# Patient Record
Sex: FEMALE | Race: BLACK OR AFRICAN AMERICAN | NOT HISPANIC OR LATINO | Employment: FULL TIME | URBAN - METROPOLITAN AREA
[De-identification: names, ages, dates, MRNs, and addresses within clinical notes are randomized per-mention and may not be internally consistent; named-entity substitution may affect disease eponyms.]

---

## 2017-03-17 ENCOUNTER — OFFICE VISIT (OUTPATIENT)
Dept: OBSTETRICS AND GYNECOLOGY | Facility: CLINIC | Age: 32
End: 2017-03-17
Payer: MEDICAID

## 2017-03-17 VITALS
SYSTOLIC BLOOD PRESSURE: 112 MMHG | DIASTOLIC BLOOD PRESSURE: 84 MMHG | BODY MASS INDEX: 38.31 KG/M2 | WEIGHT: 258.63 LBS | HEIGHT: 69 IN

## 2017-03-17 DIAGNOSIS — Z01.419 ENCOUNTER FOR WELL WOMAN EXAM WITH ROUTINE GYNECOLOGICAL EXAM: Primary | ICD-10-CM

## 2017-03-17 DIAGNOSIS — N89.8 VAGINAL DISCHARGE: ICD-10-CM

## 2017-03-17 DIAGNOSIS — N76.0 BV (BACTERIAL VAGINOSIS): ICD-10-CM

## 2017-03-17 DIAGNOSIS — B96.89 BV (BACTERIAL VAGINOSIS): ICD-10-CM

## 2017-03-17 PROCEDURE — 99999 PR PBB SHADOW E&M-EST. PATIENT-LVL III: CPT | Mod: PBBFAC,,, | Performed by: ADVANCED PRACTICE MIDWIFE

## 2017-03-17 PROCEDURE — 87591 N.GONORRHOEAE DNA AMP PROB: CPT

## 2017-03-17 PROCEDURE — 88141 CYTOPATH C/V INTERPRET: CPT | Mod: ,,, | Performed by: PATHOLOGY

## 2017-03-17 PROCEDURE — 99213 OFFICE O/P EST LOW 20 MIN: CPT | Mod: PBBFAC | Performed by: ADVANCED PRACTICE MIDWIFE

## 2017-03-17 PROCEDURE — 88175 CYTOPATH C/V AUTO FLUID REDO: CPT | Performed by: PATHOLOGY

## 2017-03-17 PROCEDURE — 99395 PREV VISIT EST AGE 18-39: CPT | Mod: S$PBB,,, | Performed by: ADVANCED PRACTICE MIDWIFE

## 2017-03-17 RX ORDER — METRONIDAZOLE 500 MG/1
500 TABLET ORAL 2 TIMES DAILY
Qty: 14 TABLET | Refills: 0 | Status: SHIPPED | OUTPATIENT
Start: 2017-03-17 | End: 2017-03-24

## 2017-03-17 NOTE — PROGRESS NOTES
"CC: Well woman exam    Pranay Piña is a 32 y.o. female  presents for a well woman exam.  LMP: Patient's last menstrual period was 03/10/2017..  Complaining of vaginal discharge with odor.    Past Medical History:   Diagnosis Date    Abnormal Pap smear 2005    colposcopy     Past Surgical History:   Procedure Laterality Date    Right Knee       Social History     Social History    Marital status: Single     Spouse name: N/A    Number of children: N/A    Years of education: N/A     Social History Main Topics    Smoking status: Never Smoker    Smokeless tobacco: Never Used    Alcohol use No    Drug use: No    Sexual activity: Yes     Partners: Male     Birth control/ protection: None     Other Topics Concern    None     Social History Narrative     Family History   Problem Relation Age of Onset    Hypertension Maternal Grandfather     Hypertension Mother     Hypertension Maternal Uncle     Hypertension Maternal Uncle     Breast cancer Neg Hx     Colon cancer Neg Hx     Diabetes Neg Hx     Ovarian cancer Neg Hx     Stroke Neg Hx      OB History      Para Term  AB TAB SAB Ectopic Multiple Living    4 3 3 0 1 0 1 0 0 4          /84  Ht 5' 9" (1.753 m)  Wt 117.3 kg (258 lb 9.6 oz)  LMP 03/10/2017  BMI 38.19 kg/m2      ROS:    ROS:  GENERAL: Denies weight gain or weight loss. Feeling well overall.   SKIN: Denies rash or lesions.   HEAD: Denies head injury or headache.   NODES: Denies enlarged lymph nodes.   CHEST: Denies chest pain or shortness of breath.   CARDIOVASCULAR: Denies palpitations or left sided chest pain.   ABDOMEN: No abdominal pain, constipation, diarrhea, nausea, vomiting or rectal bleeding.   URINARY: No frequency, dysuria, hematuria, or burning on urination.  REPRODUCTIVE: See HPI.   BREASTS: The patient performs breast self-examination and denies pain, lumps, or nipple discharge.   HEMATOLOGIC: No easy bruisability or excessive bleeding. "   MUSCULOSKELETAL: Denies joint pain or swelling.   NEUROLOGIC: Denies syncope or weakness.   PSYCHIATRIC: Denies depression, anxiety or mood swings.    PHYSICAL EXAM:    APPEARANCE: Well nourished, well developed, in no acute distress.  AFFECT: WNL, alert and oriented x 3  SKIN: No acne or hirsutism  NECK: Neck symmetric without masses or thyromegaly  NODES: No inguinal, cervical, axillary, or femoral lymph node enlargement  CHEST: Good respiratory effect  ABDOMEN: Soft.  No tenderness or masses.  No hepatosplenomegaly.  No hernias.  BREASTS: Symmetrical, no skin changes or visible lesions.  No palpable masses, nipple discharge bilaterally.  PELVIC: Normal external genitalia without lesions.  Normal hair distribution.  Adequate perineal body, normal urethral meatus.  Vagina moist and well rugated without lesions or discharge.  Cervix pink, without lesions, white thick, discharge present. No tenderness.  No significant cystocele or rectocele.  Bimanual exam shows uterus to be normal size, regular, mobile and nontender.  Adnexa without masses or tenderness.    EXTREMITIES: No edema.    1. Encounter for well woman exam with routine gynecological exam  Liquid-based pap smear, screening   2. Vaginal discharge  C. trachomatis/N. gonorrhoeae by AMP DNA Cervix   3. BV (bacterial vaginosis)      PLAN:  Flagyl for BV  Patient was counseled today on A.C.S. Pap guidelines and recommendations for yearly pelvic exams, mammograms and monthly self breast exams; to see her PCP for other health maintenance.

## 2017-03-20 LAB
C TRACH DNA SPEC QL NAA+PROBE: NEGATIVE
N GONORRHOEA DNA SPEC QL NAA+PROBE: NEGATIVE

## 2017-03-24 ENCOUNTER — PATIENT MESSAGE (OUTPATIENT)
Dept: OBSTETRICS AND GYNECOLOGY | Facility: CLINIC | Age: 32
End: 2017-03-24

## 2017-05-24 ENCOUNTER — TELEPHONE (OUTPATIENT)
Dept: OBSTETRICS AND GYNECOLOGY | Facility: CLINIC | Age: 32
End: 2017-05-24

## 2017-05-25 ENCOUNTER — TELEPHONE (OUTPATIENT)
Dept: OBSTETRICS AND GYNECOLOGY | Facility: HOSPITAL | Age: 32
End: 2017-05-25

## 2017-06-21 ENCOUNTER — TELEPHONE (OUTPATIENT)
Dept: OBSTETRICS AND GYNECOLOGY | Facility: CLINIC | Age: 32
End: 2017-06-21

## 2017-07-27 ENCOUNTER — OFFICE VISIT (OUTPATIENT)
Dept: OBSTETRICS AND GYNECOLOGY | Facility: CLINIC | Age: 32
End: 2017-07-27
Payer: MEDICAID

## 2017-07-27 ENCOUNTER — LAB VISIT (OUTPATIENT)
Dept: LAB | Facility: HOSPITAL | Age: 32
End: 2017-07-27
Attending: NURSE PRACTITIONER
Payer: MEDICAID

## 2017-07-27 VITALS
SYSTOLIC BLOOD PRESSURE: 122 MMHG | HEIGHT: 69 IN | DIASTOLIC BLOOD PRESSURE: 80 MMHG | BODY MASS INDEX: 38.44 KG/M2 | WEIGHT: 259.5 LBS

## 2017-07-27 DIAGNOSIS — Z34.81 ENCOUNTER FOR SUPERVISION OF OTHER NORMAL PREGNANCY IN FIRST TRIMESTER: ICD-10-CM

## 2017-07-27 DIAGNOSIS — Z34.81 ENCOUNTER FOR SUPERVISION OF OTHER NORMAL PREGNANCY IN FIRST TRIMESTER: Primary | ICD-10-CM

## 2017-07-27 LAB
BASOPHILS # BLD AUTO: 0.02 K/UL
BASOPHILS NFR BLD: 0.5 %
DIFFERENTIAL METHOD: ABNORMAL
EOSINOPHIL # BLD AUTO: 0.1 K/UL
EOSINOPHIL NFR BLD: 1.2 %
ERYTHROCYTE [DISTWIDTH] IN BLOOD BY AUTOMATED COUNT: 14.5 %
GLUCOSE SERPL-MCNC: 82 MG/DL
HCT VFR BLD AUTO: 38.4 %
HGB BLD-MCNC: 13 G/DL
HGB S BLD QL SOLY: NEGATIVE
LYMPHOCYTES # BLD AUTO: 1.7 K/UL
LYMPHOCYTES NFR BLD: 39.4 %
MCH RBC QN AUTO: 31.2 PG
MCHC RBC AUTO-ENTMCNC: 33.9 G/DL
MCV RBC AUTO: 92 FL
MONOCYTES # BLD AUTO: 0.4 K/UL
MONOCYTES NFR BLD: 9.7 %
NEUTROPHILS # BLD AUTO: 2.1 K/UL
NEUTROPHILS NFR BLD: 49.2 %
PLATELET # BLD AUTO: 292 K/UL
PMV BLD AUTO: 10.2 FL
RBC # BLD AUTO: 4.17 M/UL
WBC # BLD AUTO: 4.24 K/UL

## 2017-07-27 PROCEDURE — 86703 HIV-1/HIV-2 1 RESULT ANTBDY: CPT

## 2017-07-27 PROCEDURE — 85660 RBC SICKLE CELL TEST: CPT

## 2017-07-27 PROCEDURE — 99213 OFFICE O/P EST LOW 20 MIN: CPT | Mod: TH,S$PBB,, | Performed by: NURSE PRACTITIONER

## 2017-07-27 PROCEDURE — 85025 COMPLETE CBC W/AUTO DIFF WBC: CPT

## 2017-07-27 PROCEDURE — 86900 BLOOD TYPING SEROLOGIC ABO: CPT

## 2017-07-27 PROCEDURE — 36415 COLL VENOUS BLD VENIPUNCTURE: CPT

## 2017-07-27 PROCEDURE — 86592 SYPHILIS TEST NON-TREP QUAL: CPT

## 2017-07-27 PROCEDURE — 80074 ACUTE HEPATITIS PANEL: CPT

## 2017-07-27 PROCEDURE — 99999 PR PBB SHADOW E&M-EST. PATIENT-LVL III: CPT | Mod: PBBFAC,,, | Performed by: NURSE PRACTITIONER

## 2017-07-27 PROCEDURE — 82947 ASSAY GLUCOSE BLOOD QUANT: CPT

## 2017-07-27 PROCEDURE — 86762 RUBELLA ANTIBODY: CPT

## 2017-07-27 PROCEDURE — 86901 BLOOD TYPING SEROLOGIC RH(D): CPT

## 2017-07-27 NOTE — PROGRESS NOTES
"CC: Risk assessment    Pranay Piña is a 32 y.o. female  presents for risk assessment.  LMP: Patient's last menstrual period was 2017 (approximate)..  7 w 2 d JOSE 2018. Last pap exam was ASCUS with + HPV. S/P colpo. Patient has an OB h/o 3 term vagina deliveries and 2 SAB. Same FOB.     Past Medical History:   Diagnosis Date    Abnormal Pap smear 2005    colposcopy     Past Surgical History:   Procedure Laterality Date    Right Knee       Social History     Social History    Marital status: Single     Spouse name: N/A    Number of children: N/A    Years of education: N/A     Occupational History    Not on file.     Social History Main Topics    Smoking status: Never Smoker    Smokeless tobacco: Never Used    Alcohol use No    Drug use: No    Sexual activity: Yes     Partners: Male     Birth control/ protection: None     Other Topics Concern    Not on file     Social History Narrative    No narrative on file     Family History   Problem Relation Age of Onset    Hypertension Maternal Grandfather     Hypertension Mother     Hypertension Maternal Uncle     Hypertension Maternal Uncle     Breast cancer Neg Hx     Colon cancer Neg Hx     Diabetes Neg Hx     Ovarian cancer Neg Hx     Stroke Neg Hx      OB History      Para Term  AB Living    5 3 3 0 2 4    SAB TAB Ectopic Multiple Live Births    2 0 0 0 3          /80   Ht 5' 9" (1.753 m)   Wt 117.7 kg (259 lb 7.7 oz)   LMP 2017 (Approximate)   BMI 38.32 kg/m²       ROS:  GENERAL: Denies weight gain or weight loss. Feeling well overall.   SKIN: Denies rash or lesions.   HEAD: Denies head injury or headache.   NODES: Denies enlarged lymph nodes.   CHEST: Denies chest pain or shortness of breath.   CARDIOVASCULAR: Denies palpitations or left sided chest pain.   ABDOMEN: No abdominal pain, constipation, diarrhea, nausea, vomiting or rectal bleeding.   URINARY: No frequency, dysuria, " hematuria, or burning on urination.  REPRODUCTIVE: See HPI.   BREASTS: The patient performs breast self-examination and denies pain, lumps, or nipple discharge.   HEMATOLOGIC: No easy bruisability or excessive bleeding.   MUSCULOSKELETAL: Denies joint pain or swelling.   NEUROLOGIC: Denies syncope or weakness.   PSYCHIATRIC: Denies depression, anxiety or mood swings.    PHYSICAL EXAM:  APPEARANCE: Obese AA female, in no acute distress.  AFFECT: WNL, alert and oriented x 3  SKIN: No acne or hirsutism  NECK: Neck symmetric without masses or thyromegaly  NODES: No inguinal, cervical, axillary, or femoral lymph node enlargement  CHEST: Good respiratory effect  ABDOMEN: Soft.  No tenderness or masses.    PELVIC: Normal external genitalia without lesions.  Normal hair distribution.  Adequate perineal body, normal urethral meatus.  Vagina moist and well rugated without lesions or discharge.  Cervix pink, without lesions, discharge or tenderness.  No significant cystocele or rectocele.  Bimanual exam shows uterus to be  8 week size, regular, mobile and nontender.  Adnexa without masses or tenderness.    EXTREMITIES: No edema.    PLAN:  Initial labs  Initial OB ultrasound and visit

## 2017-07-28 LAB
ABO + RH BLD: NORMAL
BLD GP AB SCN CELLS X3 SERPL QL: NORMAL
C TRACH DNA SPEC QL NAA+PROBE: NOT DETECTED
HAV IGM SERPL QL IA: NEGATIVE
HBV CORE IGM SERPL QL IA: NEGATIVE
HBV SURFACE AG SERPL QL IA: NEGATIVE
HCV AB SERPL QL IA: NEGATIVE
HIV 1+2 AB+HIV1 P24 AG SERPL QL IA: NEGATIVE
N GONORRHOEA DNA SPEC QL NAA+PROBE: NOT DETECTED
RPR SER QL: NORMAL
RUBV IGG SER-ACNC: 12.6 IU/ML
RUBV IGG SER-IMP: REACTIVE

## 2017-07-30 ENCOUNTER — HOSPITAL ENCOUNTER (EMERGENCY)
Facility: HOSPITAL | Age: 32
Discharge: HOME | End: 2017-07-30
Attending: EMERGENCY MEDICINE
Payer: MEDICAID

## 2017-07-30 VITALS
DIASTOLIC BLOOD PRESSURE: 83 MMHG | SYSTOLIC BLOOD PRESSURE: 137 MMHG | HEIGHT: 69 IN | TEMPERATURE: 98 F | BODY MASS INDEX: 38.51 KG/M2 | WEIGHT: 260 LBS | HEART RATE: 61 BPM | RESPIRATION RATE: 20 BRPM | OXYGEN SATURATION: 98 %

## 2017-07-30 DIAGNOSIS — Z34.91 FIRST TRIMESTER PREGNANCY: ICD-10-CM

## 2017-07-30 DIAGNOSIS — B96.89 BACTERIAL VAGINOSIS: Primary | ICD-10-CM

## 2017-07-30 DIAGNOSIS — O23.41 UTI (URINARY TRACT INFECTION) DURING PREGNANCY, FIRST TRIMESTER: ICD-10-CM

## 2017-07-30 DIAGNOSIS — N76.0 BACTERIAL VAGINOSIS: Primary | ICD-10-CM

## 2017-07-30 LAB
B-HCG UR QL: POSITIVE
BACTERIA #/AREA URNS HPF: ABNORMAL /HPF
BACTERIA GENITAL QL WET PREP: ABNORMAL
BILIRUB UR QL STRIP: NEGATIVE
CLARITY UR: CLEAR
CLUE CELLS VAG QL WET PREP: ABNORMAL
COLOR UR: YELLOW
FILAMENT FUNGI VAG WET PREP-#/AREA: ABNORMAL
GLUCOSE UR QL STRIP: NEGATIVE
HCG INTACT+B SERPL-ACNC: NORMAL MIU/ML
HGB UR QL STRIP: NEGATIVE
KETONES UR QL STRIP: NEGATIVE
LEUKOCYTE ESTERASE UR QL STRIP: ABNORMAL
MICROSCOPIC COMMENT: ABNORMAL
NITRITE UR QL STRIP: NEGATIVE
PH UR STRIP: 8 [PH] (ref 5–8)
PROT UR QL STRIP: NEGATIVE
RBC #/AREA URNS HPF: 0 /HPF (ref 0–4)
SP GR UR STRIP: 1.02 (ref 1–1.03)
SPECIMEN SOURCE: ABNORMAL
SQUAMOUS #/AREA URNS HPF: 15 /HPF
T VAGINALIS GENITAL QL WET PREP: ABNORMAL
URN SPEC COLLECT METH UR: ABNORMAL
UROBILINOGEN UR STRIP-ACNC: 1 EU/DL
WBC #/AREA URNS HPF: 5 /HPF (ref 0–5)
WBC #/AREA VAG WET PREP: ABNORMAL
YEAST GENITAL QL WET PREP: ABNORMAL
YEAST URNS QL MICRO: ABNORMAL

## 2017-07-30 PROCEDURE — 99284 EMERGENCY DEPT VISIT MOD MDM: CPT

## 2017-07-30 PROCEDURE — 84702 CHORIONIC GONADOTROPIN TEST: CPT

## 2017-07-30 PROCEDURE — 87210 SMEAR WET MOUNT SALINE/INK: CPT

## 2017-07-30 PROCEDURE — 81025 URINE PREGNANCY TEST: CPT

## 2017-07-30 PROCEDURE — 81000 URINALYSIS NONAUTO W/SCOPE: CPT

## 2017-07-30 RX ORDER — NITROFURANTOIN 25; 75 MG/1; MG/1
100 CAPSULE ORAL 2 TIMES DAILY
Qty: 14 CAPSULE | Refills: 0 | Status: SHIPPED | OUTPATIENT
Start: 2017-07-30 | End: 2017-08-06

## 2017-07-30 RX ORDER — METRONIDAZOLE 7.5 MG/G
1 GEL VAGINAL 2 TIMES DAILY
Qty: 70 G | Refills: 0 | Status: SHIPPED | OUTPATIENT
Start: 2017-07-30 | End: 2017-10-24

## 2017-07-30 NOTE — ED NOTES
Pt states abdominal cramping since yesterday; is currently 7 weeks pregnant. Denies any vaginal bleeding, nausea or vomiting.

## 2017-07-30 NOTE — ED NOTES
Bed: LA 06  Expected date:   Expected time:   Means of arrival:   Comments:     Jenn Galdamez  07/30/17 [id number]

## 2017-07-30 NOTE — ED PROVIDER NOTES
SCRIBE #1 NOTE: I, Jenn Galdamez, am scribing for, and in the presence of, Per Erazo Jr., MD. I have scribed the entire note.      History      Chief Complaint   Patient presents with    Abdominal Cramping     7 weeks pregnant and crampin to lower abd,/no bleeding       Review of patient's allergies indicates:  No Known Allergies     HPI   HPI    7/30/2017, 5:51 PM   History obtained from the patient      History of Present Illness: Pranay Piña is a 32 y.o. female patient who presents to the Emergency Department for lower abd cramping which onset gradually yesterday. Pt states that she is 7 weeks pregnant. Symptoms are constant and moderate in severity.  No mitigating or exacerbating factors reported. No associated sxs reported. Patient denies any fever, chills, HA, lightheadedness, vaginal bleeding/discharge, pelvic pain, dysuria, and all other sxs at this time. No prior Tx reported. No further complaints or concerns at this time.         Arrival mode: Personal vehicle     PCP: Primary Doctor No       Past Medical History:  Past Medical History:   Diagnosis Date    Abnormal Pap smear 1/2005    colposcopy       Past Surgical History:  Past Surgical History:   Procedure Laterality Date    Right Knee           Family History:  Family History   Problem Relation Age of Onset    Hypertension Maternal Grandfather     Hypertension Mother     Hypertension Maternal Uncle     Hypertension Maternal Uncle     Breast cancer Neg Hx     Colon cancer Neg Hx     Diabetes Neg Hx     Ovarian cancer Neg Hx     Stroke Neg Hx        Social History:  Social History     Social History Main Topics    Smoking status: Never Smoker    Smokeless tobacco: Never Used    Alcohol use No    Drug use: No    Sexual activity: Yes     Partners: Male     Birth control/ protection: None       ROS   Review of Systems   Constitutional: Negative for fever.   HENT: Negative for sore throat.    Respiratory: Negative for  shortness of breath.    Cardiovascular: Negative for chest pain.   Gastrointestinal: Positive for abdominal pain. Negative for nausea.   Genitourinary: Negative for dysuria, pelvic pain, vaginal bleeding and vaginal discharge.   Musculoskeletal: Negative for back pain.   Skin: Negative for rash.   Neurological: Negative for weakness, light-headedness and headaches.   Hematological: Does not bruise/bleed easily.       Physical Exam      Initial Vitals [07/30/17 1741]   BP Pulse Resp Temp SpO2   (!) 146/80 66 18 97.9 °F (36.6 °C) 100 %      MAP       102          Physical Exam  Nursing Notes and Vital Signs Reviewed.  Constitutional: Patient is in no apparent distress. Well-developed and well-nourished.  Head: Atraumatic. Normocephalic.  Eyes: PERRL. EOM intact. Conjunctivae are not pale. No scleral icterus.  ENT: Mucous membranes are moist. Oropharynx is clear and symmetric.    Neck: Supple. Full ROM. No lymphadenopathy.  Cardiovascular: Regular rate. Regular rhythm. No murmurs, rubs, or gallops. Distal pulses are 2+ and symmetric.  Pulmonary/Chest: No respiratory distress. Clear to auscultation bilaterally. No wheezing, rales, or rhonchi.  Abdominal: Soft and non-distended.  Lower abd tenderness.  No rebound, guarding, or rigidity. Good bowel sounds.  Genitourinary: No CVA tenderness  Pelvic: A female chaperone was present for this examination. Nl external inspection. No lesions or abnormalities were visible on the labia majora or minora. Cervical os is closed. There is no CMT. There is no blood in the vaginal vault. White vaginal discharge. No adnexal tenderness. No adnexal masses.  Musculoskeletal: Moves all extremities. No obvious deformities. No edema. No calf tenderness.  Skin: Warm and dry.  Neurological:  Alert, awake, and appropriate.  Normal speech.  No acute focal neurological deficits are appreciated.  Psychiatric: Normal affect. Good eye contact. Appropriate in content.    ED Course    Procedures  ED  "Vital Signs:  Vitals:    07/30/17 1741 07/30/17 1947   BP: (!) 146/80 137/83   Pulse: 66 61   Resp: 18 20   Temp: 97.9 °F (36.6 °C) 98.4 °F (36.9 °C)   TempSrc: Oral Oral   SpO2: 100% 98%   Weight: 117.9 kg (260 lb)    Height: 5' 9" (1.753 m)        Abnormal Lab Results:  Labs Reviewed   URINALYSIS - Abnormal; Notable for the following:        Result Value    Leukocytes, UA 1+ (*)     All other components within normal limits   URINALYSIS MICROSCOPIC - Abnormal; Notable for the following:     Bacteria, UA Few (*)     All other components within normal limits   VAGINAL SCREEN - Abnormal; Notable for the following:     Clue Cells, Wet Prep Moderate (*)     WBC - Vaginal Screen Few (*)     Bacteria - Vaginal Screen Moderate (*)     All other components within normal limits   HCG, QUANTITATIVE, PREGNANCY   PREGNANCY TEST, URINE RAPID        All Lab Results:  Results for orders placed or performed during the hospital encounter of 07/30/17   hCG, quantitative, pregnancy   Result Value Ref Range    hCG Quant [id number] See Text mIU/mL   Pregnancy, urine rapid (UPT)   Result Value Ref Range    Preg Test, Ur Positive    Urinalysis   Result Value Ref Range    Specimen UA Urine, Clean Catch     Color, UA Yellow Yellow, Straw, Violette    Appearance, UA Clear Clear    pH, UA 8.0 5.0 - 8.0    Specific Gravity, UA 1.020 1.005 - 1.030    Protein, UA Negative Negative    Glucose, UA Negative Negative    Ketones, UA Negative Negative    Bilirubin (UA) Negative Negative    Occult Blood UA Negative Negative    Nitrite, UA Negative Negative    Urobilinogen, UA 1.0 <2.0 EU/dL    Leukocytes, UA 1+ (A) Negative   Urinalysis Microscopic   Result Value Ref Range    RBC, UA 0 0 - 4 /hpf    WBC, UA 5 0 - 5 /hpf    Bacteria, UA Few (A) None-Occ /hpf    Yeast, UA CANCELED     Squam Epithel, UA 15 /hpf    Microscopic Comment SEE COMMENT    Vaginal Screen Vagina   Result Value Ref Range    Trichomonas None None    Clue Cells, Wet Prep Moderate (A) None "    Budding Yeast None None    Fungal Hyphae None None    WBC - Vaginal Screen Few (A) None    Bacteria - Vaginal Screen Moderate (A) None    Wet Prep Source Vagina None                The Emergency Provider reviewed the vital signs and test results, which are outlined above.    ED Discussion   Pre-hypertension/Hypertension: The pt has been informed that they may have pre-hypertension or hypertension based on a blood pressure reading in the ED. I recommend that the pt call the PCP listed on their discharge instructions or a physician of their choice this week to arrange f/u for further evaluation of possible pre-hypertension or hypertension.     6:53 PM: Fetal heart tones IUP verified at this time.    7:07 PM: Reassessed pt at this time. Discussed with pt all pertinent ED information and results. Discussed pt dx  and plan of tx. Gave pt all f/u and return to the ED instructions. All questions and concerns were addressed at this time. Pt expresses understanding of information and instructions, and is comfortable with plan to discharge. Pt is stable for discharge.    I discussed with patient and/or family/caretaker that evaluation in the ED does not suggest any emergent or life threatening medical conditions requiring immediate intervention beyond what was provided in the ED, and I believe patient is safe for discharge.  Regardless, an unremarkable evaluation in the ED does not preclude the development or presence of a serious of life threatening condition. As such, patient was instructed to return immediately for any worsening or change in current symptoms.      ED Medication(s):  Medications - No data to display    New Prescriptions    METRONIDAZOLE (METROGEL) 0.75 % VAGINAL GEL    Place 1 applicator vaginally 2 (two) times daily. For 5 days    NITROFURANTOIN, MACROCRYSTAL-MONOHYDRATE, (MACROBID) 100 MG CAPSULE    Take 1 capsule (100 mg total) by mouth 2 (two) times daily.    NITROFURANTOIN, MACROCRYSTAL-MONOHYDRATE,  (MACROBID) 100 MG CAPSULE    Take 1 capsule (100 mg total) by mouth 2 (two) times daily.       Follow-up Information     [facility]. Call in 1 day.    Why:  to schedule appt with OBGYN provider here at [facility] for recheck  Contact information:  [facility], [place]                   Medical Decision Making    Medical Decision Making:   Clinical Tests:   Lab Tests: Reviewed and Ordered           Scribe Attestation:   Scribe #1: I performed the above scribed service and the documentation accurately describes the services I performed. I attest to the accuracy of the note.    Attending:   Physician Attestation Statement for Scribe #1: I, Per Erazo Jr., MD, personally performed the services described in this documentation, as scribed by Jenn Galdamez, in my presence, and it is both accurate and complete.          Clinical Impression       ICD-10-CM ICD-9-CM   1. Bacterial vaginosis N76.0 616.10    B96.89 041.9   2. First trimester pregnancy Z33.1 V22.2   3. UTI (urinary tract infection) during pregnancy, first trimester O23.41 646.63     599.0       Disposition:   Disposition: Discharged  Condition: Stable         Per Erazo Jr., MD  07/31/17 0043

## 2017-08-14 ENCOUNTER — PROCEDURE VISIT (OUTPATIENT)
Dept: OBSTETRICS AND GYNECOLOGY | Facility: CLINIC | Age: 32
End: 2017-08-14
Payer: MEDICAID

## 2017-08-14 ENCOUNTER — INITIAL PRENATAL (OUTPATIENT)
Dept: OBSTETRICS AND GYNECOLOGY | Facility: CLINIC | Age: 32
End: 2017-08-14
Payer: MEDICAID

## 2017-08-14 VITALS
DIASTOLIC BLOOD PRESSURE: 70 MMHG | SYSTOLIC BLOOD PRESSURE: 115 MMHG | BODY MASS INDEX: 38.29 KG/M2 | WEIGHT: 259.25 LBS

## 2017-08-14 DIAGNOSIS — O09.41: ICD-10-CM

## 2017-08-14 DIAGNOSIS — Z34.81 ENCOUNTER FOR SUPERVISION OF OTHER NORMAL PREGNANCY IN FIRST TRIMESTER: ICD-10-CM

## 2017-08-14 PROBLEM — O09.40 GRAND MULTIPARITY, WITH CURRENT PREGNANCY, ANTEPARTUM: Status: ACTIVE | Noted: 2017-08-14

## 2017-08-14 PROCEDURE — 76801 OB US < 14 WKS SINGLE FETUS: CPT | Mod: 26,S$PBB,, | Performed by: OBSTETRICS & GYNECOLOGY

## 2017-08-14 PROCEDURE — 99212 OFFICE O/P EST SF 10 MIN: CPT | Mod: TH,S$PBB,, | Performed by: ADVANCED PRACTICE MIDWIFE

## 2017-08-14 PROCEDURE — 99999 PR PBB SHADOW E&M-EST. PATIENT-LVL III: CPT | Mod: PBBFAC,,, | Performed by: ADVANCED PRACTICE MIDWIFE

## 2017-08-14 PROCEDURE — 99213 OFFICE O/P EST LOW 20 MIN: CPT | Mod: PBBFAC | Performed by: ADVANCED PRACTICE MIDWIFE

## 2017-08-14 PROCEDURE — 3008F BODY MASS INDEX DOCD: CPT | Mod: ,,, | Performed by: ADVANCED PRACTICE MIDWIFE

## 2017-08-14 RX ORDER — PROMETHAZINE HYDROCHLORIDE 25 MG/1
25 TABLET ORAL EVERY 4 HOURS PRN
Qty: 30 TABLET | Refills: 1 | Status: SHIPPED | OUTPATIENT
Start: 2017-08-14 | End: 2017-10-24

## 2017-08-14 NOTE — PROGRESS NOTES
Familiar with our practice, blue bag info reviewed, zika and dietary precautions discussed. + nausea, freq snacks/alt dry/liquids/ginger/ginger ale. On PNV, reviewed US and JOSE. Consent signed. al

## 2017-09-11 ENCOUNTER — ROUTINE PRENATAL (OUTPATIENT)
Dept: OBSTETRICS AND GYNECOLOGY | Facility: CLINIC | Age: 32
End: 2017-09-11
Payer: MEDICAID

## 2017-09-11 VITALS
BODY MASS INDEX: 38.74 KG/M2 | DIASTOLIC BLOOD PRESSURE: 74 MMHG | SYSTOLIC BLOOD PRESSURE: 108 MMHG | WEIGHT: 262.38 LBS

## 2017-09-11 DIAGNOSIS — O09.42: Primary | ICD-10-CM

## 2017-09-11 PROCEDURE — 99213 OFFICE O/P EST LOW 20 MIN: CPT | Mod: PBBFAC | Performed by: ADVANCED PRACTICE MIDWIFE

## 2017-09-11 PROCEDURE — 99999 PR PBB SHADOW E&M-EST. PATIENT-LVL III: CPT | Mod: PBBFAC,,, | Performed by: ADVANCED PRACTICE MIDWIFE

## 2017-09-11 PROCEDURE — 3008F BODY MASS INDEX DOCD: CPT | Mod: ,,, | Performed by: ADVANCED PRACTICE MIDWIFE

## 2017-09-11 PROCEDURE — 99213 OFFICE O/P EST LOW 20 MIN: CPT | Mod: TH,S$PBB,, | Performed by: ADVANCED PRACTICE MIDWIFE

## 2017-10-07 ENCOUNTER — HOSPITAL ENCOUNTER (EMERGENCY)
Facility: HOSPITAL | Age: 32
Discharge: HOME | End: 2017-10-07
Payer: MEDICAID

## 2017-10-07 VITALS
DIASTOLIC BLOOD PRESSURE: 81 MMHG | TEMPERATURE: 99 F | BODY MASS INDEX: 38.95 KG/M2 | RESPIRATION RATE: 20 BRPM | OXYGEN SATURATION: 99 % | SYSTOLIC BLOOD PRESSURE: 119 MMHG | HEART RATE: 89 BPM | WEIGHT: 263 LBS | HEIGHT: 69 IN

## 2017-10-07 DIAGNOSIS — J02.9 PHARYNGITIS, UNSPECIFIED ETIOLOGY: Primary | ICD-10-CM

## 2017-10-07 LAB
BACTERIA #/AREA URNS HPF: NORMAL /HPF
BILIRUB UR QL STRIP: NEGATIVE
CLARITY UR: CLEAR
COLOR UR: YELLOW
GLUCOSE UR QL STRIP: NEGATIVE
HGB UR QL STRIP: NEGATIVE
HYALINE CASTS #/AREA URNS LPF: 0 /LPF
KETONES UR QL STRIP: NEGATIVE
LEUKOCYTE ESTERASE UR QL STRIP: ABNORMAL
MICROSCOPIC COMMENT: NORMAL
NITRITE UR QL STRIP: NEGATIVE
PH UR STRIP: 7 [PH] (ref 5–8)
PROT UR QL STRIP: ABNORMAL
RBC #/AREA URNS HPF: 0 /HPF (ref 0–4)
SP GR UR STRIP: 1.02 (ref 1–1.03)
SQUAMOUS #/AREA URNS HPF: 50 /HPF
URN SPEC COLLECT METH UR: ABNORMAL
UROBILINOGEN UR STRIP-ACNC: 1 EU/DL
WBC #/AREA URNS HPF: 3 /HPF (ref 0–5)

## 2017-10-07 PROCEDURE — 81000 URINALYSIS NONAUTO W/SCOPE: CPT

## 2017-10-07 PROCEDURE — 99283 EMERGENCY DEPT VISIT LOW MDM: CPT

## 2017-10-07 RX ORDER — AMOXICILLIN AND CLAVULANATE POTASSIUM 875; 125 MG/1; MG/1
1 TABLET, FILM COATED ORAL 2 TIMES DAILY
Qty: 14 TABLET | Refills: 0 | Status: SHIPPED | OUTPATIENT
Start: 2017-10-07 | End: 2017-10-07

## 2017-10-07 RX ORDER — AMOXICILLIN AND CLAVULANATE POTASSIUM 875; 125 MG/1; MG/1
1 TABLET, FILM COATED ORAL 2 TIMES DAILY
Qty: 14 TABLET | Refills: 0 | Status: SHIPPED | OUTPATIENT
Start: 2017-10-07 | End: 2017-10-24

## 2017-10-07 NOTE — ED PROVIDER NOTES
Encounter Date: 10/7/2017       History     Chief Complaint   Patient presents with    Sore Throat     pt c/o sore throat and cough since sunday, states she is 17 wks pregnant     Pt is 17 weeks pregnant. Pt denies vaginal bleeding/discharge, abd pain.      The history is provided by the patient.   URI   The primary symptoms include sore throat and cough. Primary symptoms do not include fever, headaches, abdominal pain, nausea, vomiting, myalgias, arthralgias or rash. The current episode started several days ago. This is a new problem. The problem has not changed since onset.  Symptoms associated with the illness include congestion and rhinorrhea. The illness is not associated with chills, plugged ear sensation, facial pain or sinus pressure. The following treatments were addressed: Acetaminophen was effective.   Dysuria    This is a new problem. The current episode started two days ago. The problem occurs intermittently. The problem has been unchanged. Associated symptoms include hematuria. Pertinent negatives include no chills, no sweats, no nausea, no vomiting, no discharge, no frequency, no hesitancy, no urgency and no flank pain.     Review of patient's allergies indicates:  No Known Allergies  Past Medical History:   Diagnosis Date    Abnormal Pap smear 1/2005    colposcopy     Past Surgical History:   Procedure Laterality Date    Right Knee       Family History   Problem Relation Age of Onset    Hypertension Maternal Grandfather     Hypertension Mother     Hypertension Maternal Uncle     Hypertension Maternal Uncle     Breast cancer Neg Hx     Colon cancer Neg Hx     Diabetes Neg Hx     Ovarian cancer Neg Hx     Stroke Neg Hx      Social History   Substance Use Topics    Smoking status: Never Smoker    Smokeless tobacco: Never Used    Alcohol use No     Review of Systems   Constitutional: Negative for chills and fever.   HENT: Positive for congestion, rhinorrhea and sore throat. Negative for  sinus pressure.    Eyes: Negative for photophobia and redness.   Respiratory: Positive for cough. Negative for shortness of breath.    Cardiovascular: Negative for chest pain.   Gastrointestinal: Negative for abdominal pain, diarrhea, nausea and vomiting.   Endocrine: Negative for polydipsia and polyphagia.   Genitourinary: Positive for dysuria and hematuria. Negative for flank pain, frequency, hesitancy and urgency.   Musculoskeletal: Negative for arthralgias, back pain and myalgias.   Skin: Negative for rash.   Neurological: Negative for weakness and headaches.   Hematological: Does not bruise/bleed easily.   Psychiatric/Behavioral: The patient is not nervous/anxious.    All other systems reviewed and are negative.      Physical Exam     Initial Vitals [10/07/17 0829]   BP Pulse Resp Temp SpO2   119/81 89 20 98.5 °F (36.9 °C) 99 %      MAP       93.67         Physical Exam    Nursing note and vitals reviewed.  Constitutional: Vital signs are normal. She appears well-developed and well-nourished. No distress.   HENT:   Head: Normocephalic and atraumatic.   Right Ear: External ear normal.   Left Ear: External ear normal.   Nose: Nose normal.   Mouth/Throat: Posterior oropharyngeal edema and posterior oropharyngeal erythema present. No oropharyngeal exudate or tonsillar abscesses.   Eyes: Conjunctivae, EOM and lids are normal. Pupils are equal, round, and reactive to light.   Neck: Normal range of motion and full passive range of motion without pain. Neck supple.   Cardiovascular: Normal rate, regular rhythm, S1 normal, S2 normal, normal heart sounds, intact distal pulses and normal pulses.   Pulmonary/Chest: Breath sounds normal. No respiratory distress. She has no wheezes. She has no rales.   Abdominal: Soft. Normal appearance and bowel sounds are normal. She exhibits no distension. There is no tenderness.   Musculoskeletal: Normal range of motion.   Lymphadenopathy:     She has no cervical adenopathy.    Neurological: She is alert and oriented to person, place, and time. She has normal strength. No cranial nerve deficit or sensory deficit. Coordination and gait normal.   Skin: Skin is warm, dry and intact.   Psychiatric: She has a normal mood and affect. Her speech is normal and behavior is normal. Judgment and thought content normal. Cognition and memory are normal.         ED Course   Procedures  Labs Reviewed   URINALYSIS                               ED Course      Clinical Impression:   The encounter diagnosis was Pharyngitis, unspecified etiology.    Disposition:   Disposition: Discharged  Condition: Stable                        KIM Hurt  10/07/17 0944

## 2017-10-07 NOTE — ED NOTES
Patient identifiers verified and correct for Pranay Piña.    LOC: The patient is awake, alert and aware of environment with an appropriate affect, the patient is oriented x 3 and speaking appropriately.  APPEARANCE: Patient resting comfortably and in no acute distress, patient is clean and well groomed, patient's clothing is properly fastened.  SKIN: The skin is warm and dry, color consistent with ethnicity, patient has normal skin turgor and moist mucus membranes, skin intact, no breakdown or bruising noted.  MUSCULOSKELETAL: Patient moving all extremities spontaneously.  RESPIRATORY: Airway is open and patent, respirations are spontaneous.  CARDIAC: Patient has a normal rate, no peripheral edema noted, capillary refill < 3 seconds.  ABDOMEN: Soft and non tender to palpation.

## 2017-10-10 ENCOUNTER — ROUTINE PRENATAL (OUTPATIENT)
Dept: OBSTETRICS AND GYNECOLOGY | Facility: CLINIC | Age: 32
End: 2017-10-10
Payer: MEDICAID

## 2017-10-10 VITALS — BODY MASS INDEX: 38.32 KG/M2 | WEIGHT: 259.5 LBS | SYSTOLIC BLOOD PRESSURE: 122 MMHG | DIASTOLIC BLOOD PRESSURE: 64 MMHG

## 2017-10-10 DIAGNOSIS — Z3A.18 18 WEEKS GESTATION OF PREGNANCY: ICD-10-CM

## 2017-10-10 DIAGNOSIS — Z36.3 ANTENATAL SCREENING FOR MALFORMATION USING ULTRASONICS: ICD-10-CM

## 2017-10-10 DIAGNOSIS — O09.40 GRAND MULTIPARITY, WITH CURRENT PREGNANCY, ANTEPARTUM: Primary | ICD-10-CM

## 2017-10-10 PROCEDURE — 99212 OFFICE O/P EST SF 10 MIN: CPT | Mod: TH,S$PBB,, | Performed by: MIDWIFE

## 2017-10-10 PROCEDURE — 99212 OFFICE O/P EST SF 10 MIN: CPT | Mod: PBBFAC | Performed by: MIDWIFE

## 2017-10-10 PROCEDURE — 99999 PR PBB SHADOW E&M-EST. PATIENT-LVL II: CPT | Mod: PBBFAC,,, | Performed by: MIDWIFE

## 2017-10-10 NOTE — PROGRESS NOTES
Complaints today: periumbilical pain    /64   Wt 117.7 kg (259 lb 7.7 oz)   LMP 2017 (Approximate)   BMI 38.32 kg/m²     32 y.o., at 18w0d by Estimated Date of Delivery: 3/13/18  Patient Active Problem List   Diagnosis    Grand multiparity, with current pregnancy, antepartum     OB History    Para Term  AB Living   6 3 3 0 2 4   SAB TAB Ectopic Multiple Live Births   2 0 0 0 3      # Outcome Date GA Lbr Drake/2nd Weight Sex Delivery Anes PTL Lv   6 Current            5 Term 02/10/15 40w0d  3.912 kg (8 lb 10 oz) F Vag-Spont EPI N NOAM   4 Term 03 40w0d 01:00 3.969 kg (8 lb 12 oz) F Vag-Spont None N NOAM   3 Term 99 40w0d 12:00 4.252 kg (9 lb 6 oz) M Vag-Spont None N NOAM   2 SAB            1 SAB                   Dating reviewed    Allergies and problem list reviewed and updated    Medical and surgical history reviewed    Prenatal labs reviewed and updated    Physical Exam:  ABD: soft, gravid, nontender, obese  Palpable hernia above naval noted with tightening of abdominal muscles    Assessment:  Obesity in pregnancy    Plan:   US at for anatomy  Tyenol for discomfort, will re eval at nv   follow up 2 Weeks

## 2017-10-24 ENCOUNTER — PROCEDURE VISIT (OUTPATIENT)
Dept: OBSTETRICS AND GYNECOLOGY | Facility: CLINIC | Age: 32
End: 2017-10-24
Payer: MEDICAID

## 2017-10-24 ENCOUNTER — ROUTINE PRENATAL (OUTPATIENT)
Dept: OBSTETRICS AND GYNECOLOGY | Facility: CLINIC | Age: 32
End: 2017-10-24
Payer: MEDICAID

## 2017-10-24 VITALS
SYSTOLIC BLOOD PRESSURE: 116 MMHG | WEIGHT: 270.06 LBS | BODY MASS INDEX: 39.88 KG/M2 | DIASTOLIC BLOOD PRESSURE: 70 MMHG

## 2017-10-24 DIAGNOSIS — Z36.3 ANTENATAL SCREENING FOR MALFORMATION USING ULTRASONICS: ICD-10-CM

## 2017-10-24 DIAGNOSIS — O09.40 GRAND MULTIPARITY, WITH CURRENT PREGNANCY, ANTEPARTUM: Primary | ICD-10-CM

## 2017-10-24 PROCEDURE — 99999 PR PBB SHADOW E&M-EST. PATIENT-LVL III: CPT | Mod: PBBFAC,,, | Performed by: ADVANCED PRACTICE MIDWIFE

## 2017-10-24 PROCEDURE — 99213 OFFICE O/P EST LOW 20 MIN: CPT | Mod: TH,S$PBB,, | Performed by: ADVANCED PRACTICE MIDWIFE

## 2017-10-24 PROCEDURE — 76805 OB US >/= 14 WKS SNGL FETUS: CPT | Mod: PBBFAC | Performed by: OBSTETRICS & GYNECOLOGY

## 2017-10-24 PROCEDURE — 99213 OFFICE O/P EST LOW 20 MIN: CPT | Mod: PBBFAC | Performed by: ADVANCED PRACTICE MIDWIFE

## 2017-10-24 PROCEDURE — 76805 OB US >/= 14 WKS SNGL FETUS: CPT | Mod: 26,S$PBB,, | Performed by: OBSTETRICS & GYNECOLOGY

## 2017-10-24 NOTE — PROGRESS NOTES
US today girl, subopt anatomy will f/u in 4 wks. Denies problems, declines flu shot. No c/o. Coffective counseling sheet Get Ready discussed with mother. Reinforced avoiding induction of labor unless medically indicated as well as comfort measures during labor.  Encouraged mother to download Coffective mobile harvey if she has not already done so. Mother verbalizes understanding. al

## 2017-11-03 ENCOUNTER — ROUTINE PRENATAL (OUTPATIENT)
Dept: OBSTETRICS AND GYNECOLOGY | Facility: CLINIC | Age: 32
End: 2017-11-03
Payer: MEDICAID

## 2017-11-03 VITALS
WEIGHT: 266.56 LBS | DIASTOLIC BLOOD PRESSURE: 62 MMHG | BODY MASS INDEX: 39.36 KG/M2 | SYSTOLIC BLOOD PRESSURE: 108 MMHG

## 2017-11-03 DIAGNOSIS — O09.40 GRAND MULTIPARITY, WITH CURRENT PREGNANCY, ANTEPARTUM: Primary | ICD-10-CM

## 2017-11-03 PROCEDURE — 99999 PR PBB SHADOW E&M-EST. PATIENT-LVL II: CPT | Mod: PBBFAC,,, | Performed by: ADVANCED PRACTICE MIDWIFE

## 2017-11-03 PROCEDURE — 99213 OFFICE O/P EST LOW 20 MIN: CPT | Mod: TH,S$PBB,, | Performed by: ADVANCED PRACTICE MIDWIFE

## 2017-11-03 PROCEDURE — 99212 OFFICE O/P EST SF 10 MIN: CPT | Mod: PBBFAC | Performed by: ADVANCED PRACTICE MIDWIFE

## 2017-11-03 RX ORDER — AZITHROMYCIN 250 MG/1
TABLET, FILM COATED ORAL
Qty: 2 TABLET | Refills: 0 | Status: SHIPPED | OUTPATIENT
Start: 2017-11-03 | End: 2017-11-08

## 2017-11-06 NOTE — PROGRESS NOTES
C/o congestion, + green mucus, rx zpak provided, OTC meds discussed. F/u anatomy scan already scheduled. al

## 2017-11-21 ENCOUNTER — ROUTINE PRENATAL (OUTPATIENT)
Dept: OBSTETRICS AND GYNECOLOGY | Facility: CLINIC | Age: 32
End: 2017-11-21
Payer: MEDICAID

## 2017-11-21 ENCOUNTER — PROCEDURE VISIT (OUTPATIENT)
Dept: OBSTETRICS AND GYNECOLOGY | Facility: CLINIC | Age: 32
End: 2017-11-21
Payer: MEDICAID

## 2017-11-21 VITALS
WEIGHT: 271.38 LBS | DIASTOLIC BLOOD PRESSURE: 72 MMHG | SYSTOLIC BLOOD PRESSURE: 106 MMHG | BODY MASS INDEX: 40.08 KG/M2

## 2017-11-21 DIAGNOSIS — O09.40 GRAND MULTIPARITY, WITH CURRENT PREGNANCY, ANTEPARTUM: Primary | ICD-10-CM

## 2017-11-21 PROCEDURE — 76815 OB US LIMITED FETUS(S): CPT | Mod: PBBFAC | Performed by: OBSTETRICS & GYNECOLOGY

## 2017-11-21 PROCEDURE — 99999 PR PBB SHADOW E&M-EST. PATIENT-LVL III: CPT | Mod: PBBFAC,,, | Performed by: ADVANCED PRACTICE MIDWIFE

## 2017-11-21 PROCEDURE — 76815 OB US LIMITED FETUS(S): CPT | Mod: 26,S$PBB,, | Performed by: OBSTETRICS & GYNECOLOGY

## 2017-11-21 PROCEDURE — 99213 OFFICE O/P EST LOW 20 MIN: CPT | Mod: PBBFAC | Performed by: ADVANCED PRACTICE MIDWIFE

## 2017-11-21 PROCEDURE — 99213 OFFICE O/P EST LOW 20 MIN: CPT | Mod: TH,S$PBB,, | Performed by: ADVANCED PRACTICE MIDWIFE

## 2017-11-21 NOTE — PROGRESS NOTES
C/o abd pressure, no ctx. Recommended maternity belt. US today anatomy complete. Labs next OV> Coffective counseling sheet Fall In Love discussed with mother. Reinforced immediate skin to skin, the magic first hour, importance of the first feeding and delaying routine procedures. Encouraged mother to download Coffective mobile harvey if she has not already done so. Mother verbalizes understanding. al

## 2017-12-20 ENCOUNTER — ROUTINE PRENATAL (OUTPATIENT)
Dept: OBSTETRICS AND GYNECOLOGY | Facility: CLINIC | Age: 32
End: 2017-12-20
Payer: MEDICAID

## 2017-12-20 ENCOUNTER — LAB VISIT (OUTPATIENT)
Dept: LAB | Facility: HOSPITAL | Age: 32
End: 2017-12-20
Attending: ADVANCED PRACTICE MIDWIFE
Payer: MEDICAID

## 2017-12-20 VITALS
SYSTOLIC BLOOD PRESSURE: 112 MMHG | WEIGHT: 270.31 LBS | BODY MASS INDEX: 39.91 KG/M2 | DIASTOLIC BLOOD PRESSURE: 82 MMHG

## 2017-12-20 DIAGNOSIS — O09.40 GRAND MULTIPARITY, WITH CURRENT PREGNANCY, ANTEPARTUM: ICD-10-CM

## 2017-12-20 DIAGNOSIS — O09.40 GRAND MULTIPARITY, WITH CURRENT PREGNANCY, ANTEPARTUM: Primary | ICD-10-CM

## 2017-12-20 LAB
BASOPHILS # BLD AUTO: 0.01 K/UL
BASOPHILS NFR BLD: 0.3 %
DIFFERENTIAL METHOD: ABNORMAL
EOSINOPHIL # BLD AUTO: 0.1 K/UL
EOSINOPHIL NFR BLD: 1.3 %
ERYTHROCYTE [DISTWIDTH] IN BLOOD BY AUTOMATED COUNT: 13.2 %
GLUCOSE SERPL-MCNC: 79 MG/DL
HCT VFR BLD AUTO: 33.5 %
HGB BLD-MCNC: 11 G/DL
IMM GRANULOCYTES # BLD AUTO: 0.01 K/UL
IMM GRANULOCYTES NFR BLD AUTO: 0.3 %
LYMPHOCYTES # BLD AUTO: 1.1 K/UL
LYMPHOCYTES NFR BLD: 28.9 %
MCH RBC QN AUTO: 31 PG
MCHC RBC AUTO-ENTMCNC: 32.8 G/DL
MCV RBC AUTO: 94 FL
MONOCYTES # BLD AUTO: 0.3 K/UL
MONOCYTES NFR BLD: 8.2 %
NEUTROPHILS # BLD AUTO: 2.3 K/UL
NEUTROPHILS NFR BLD: 61 %
NRBC BLD-RTO: 0 /100 WBC
PLATELET # BLD AUTO: 306 K/UL
PMV BLD AUTO: 10.3 FL
RBC # BLD AUTO: 3.55 M/UL
WBC # BLD AUTO: 3.8 K/UL

## 2017-12-20 PROCEDURE — 86592 SYPHILIS TEST NON-TREP QUAL: CPT

## 2017-12-20 PROCEDURE — 99212 OFFICE O/P EST SF 10 MIN: CPT | Mod: PBBFAC | Performed by: ADVANCED PRACTICE MIDWIFE

## 2017-12-20 PROCEDURE — 85025 COMPLETE CBC W/AUTO DIFF WBC: CPT

## 2017-12-20 PROCEDURE — 82950 GLUCOSE TEST: CPT

## 2017-12-20 PROCEDURE — 99999 PR PBB SHADOW E&M-EST. PATIENT-LVL II: CPT | Mod: PBBFAC,,, | Performed by: ADVANCED PRACTICE MIDWIFE

## 2017-12-20 PROCEDURE — 86703 HIV-1/HIV-2 1 RESULT ANTBDY: CPT

## 2017-12-20 PROCEDURE — 99213 OFFICE O/P EST LOW 20 MIN: CPT | Mod: TH,S$PBB,, | Performed by: ADVANCED PRACTICE MIDWIFE

## 2017-12-20 PROCEDURE — 36415 COLL VENOUS BLD VENIPUNCTURE: CPT

## 2017-12-20 NOTE — PROGRESS NOTES
C/o lower back pain. Discussed maternity belt. Gave pt name/number for chiropractor. 28 week PN labs done today.     Coffective counseling sheet Keep Baby Close discussed with mother. Reinforced rooming in practices, continued skin to skin, and quiet hours as requested by mother.  Encouraged mother to download Coffective mobile harvey if she has not already done so. Mother verbalizes understanding.

## 2017-12-21 LAB
HIV 1+2 AB+HIV1 P24 AG SERPL QL IA: NEGATIVE
RPR SER QL: NORMAL

## 2018-01-03 ENCOUNTER — HOSPITAL ENCOUNTER (OUTPATIENT)
Facility: HOSPITAL | Age: 33
Discharge: HOME | End: 2018-01-03
Attending: OBSTETRICS & GYNECOLOGY | Admitting: OBSTETRICS & GYNECOLOGY
Payer: MEDICAID

## 2018-01-03 VITALS — WEIGHT: 268 LBS | TEMPERATURE: 98 F | HEIGHT: 69 IN | BODY MASS INDEX: 39.69 KG/M2

## 2018-01-03 DIAGNOSIS — O47.00 THREATENED PRETERM LABOR: ICD-10-CM

## 2018-01-03 DIAGNOSIS — O47.03 THREATENED PREMATURE LABOR IN THIRD TRIMESTER: ICD-10-CM

## 2018-01-03 LAB
BILIRUB UR QL STRIP: NEGATIVE
CLARITY UR: CLEAR
COLOR UR: YELLOW
GLUCOSE UR QL STRIP: NEGATIVE
HGB UR QL STRIP: NEGATIVE
KETONES UR QL STRIP: NEGATIVE
LEUKOCYTE ESTERASE UR QL STRIP: NEGATIVE
NITRITE UR QL STRIP: NEGATIVE
PH UR STRIP: 7 [PH] (ref 5–8)
PROT UR QL STRIP: NEGATIVE
SP GR UR STRIP: 1.01 (ref 1–1.03)
URN SPEC COLLECT METH UR: NORMAL
UROBILINOGEN UR STRIP-ACNC: NEGATIVE EU/DL

## 2018-01-03 PROCEDURE — 25000003 PHARM REV CODE 250: Performed by: ADVANCED PRACTICE MIDWIFE

## 2018-01-03 PROCEDURE — 99211 OFF/OP EST MAY X REQ PHY/QHP: CPT | Mod: 25

## 2018-01-03 PROCEDURE — 59025 FETAL NON-STRESS TEST: CPT | Mod: 26,,, | Performed by: ADVANCED PRACTICE MIDWIFE

## 2018-01-03 PROCEDURE — 99213 OFFICE O/P EST LOW 20 MIN: CPT | Mod: TH,25,, | Performed by: ADVANCED PRACTICE MIDWIFE

## 2018-01-03 PROCEDURE — 59025 FETAL NON-STRESS TEST: CPT

## 2018-01-03 PROCEDURE — 81003 URINALYSIS AUTO W/O SCOPE: CPT

## 2018-01-03 RX ORDER — ONDANSETRON 8 MG/1
8 TABLET, ORALLY DISINTEGRATING ORAL EVERY 8 HOURS PRN
Status: DISCONTINUED | OUTPATIENT
Start: 2018-01-03 | End: 2018-01-03 | Stop reason: HOSPADM

## 2018-01-03 RX ORDER — BENZONATATE 100 MG/1
100 CAPSULE ORAL 3 TIMES DAILY PRN
Status: DISCONTINUED | OUTPATIENT
Start: 2018-01-03 | End: 2018-01-03 | Stop reason: HOSPADM

## 2018-01-03 RX ORDER — ACETAMINOPHEN 500 MG
500 TABLET ORAL EVERY 6 HOURS PRN
Status: DISCONTINUED | OUTPATIENT
Start: 2018-01-03 | End: 2018-01-03 | Stop reason: HOSPADM

## 2018-01-03 RX ORDER — BENZONATATE 100 MG/1
100 CAPSULE ORAL 3 TIMES DAILY PRN
Qty: 30 CAPSULE | Refills: 0 | Status: SHIPPED | OUTPATIENT
Start: 2018-01-03 | End: 2018-01-13

## 2018-01-03 RX ADMIN — BENZONATATE 100 MG: 100 CAPSULE ORAL at 08:01

## 2018-01-04 NOTE — H&P
[facility] -   Obstetrics  History & Physical    Patient Name: Pranay Piña  MRN: [id number]  Admission Date: 1/3/2018  Primary Care Provider: Primary Doctor No    Subjective:     Principal Problem:<principal problem not specified>    History of Present Illness:   IUP at 30w1d    Obstetric HPI:  Patient reports None contractions, active fetal movement, No vaginal bleeding , No loss of fluid     This pregnancy has been complicated by grand multiparity    Obstetric History       T3      L3     SAB2   TAB0   Ectopic0   Multiple0   Live Births3       # Outcome Date GA Lbr Drake/2nd Weight Sex Delivery Anes PTL Lv   6 Current            5 Term 02/10/15 40w0d  3.912 kg (8 lb 10 oz) F Vag-Spont EPI N NOAM   4 Term 03 40w0d 01:00 3.969 kg (8 lb 12 oz) F Vag-Spont None N NOAM   3 Term 99 40w0d 12:00 4.252 kg (9 lb 6 oz) M Vag-Spont None N NOAM   2 SAB            1 SAB                 Past Medical History:   Diagnosis Date    Abnormal Pap smear 2005    colposcopy     Past Surgical History:   Procedure Laterality Date    Right Knee         PTA Medications   Medication Sig    albuterol 90 mcg/actuation inhaler Inhale 1-2 puffs into the lungs every 6 (six) hours as needed for Wheezing.    PRENATAL 105-IRON-FOLIC AC-DHA ORAL Take by mouth.       Review of patient's allergies indicates:  No Known Allergies     Family History     Problem Relation (Age of Onset)    Hypertension Maternal Grandfather, Mother, Maternal Uncle, Maternal Uncle        Social History Main Topics    Smoking status: Never Smoker    Smokeless tobacco: Never Used    Alcohol use No    Drug use: No    Sexual activity: Yes     Partners: Male     Birth control/ protection: None     Review of Systems   Constitutional: Negative.    HENT: Negative for mouth sores.    Eyes: Negative.    Respiratory: Positive for cough.    Cardiovascular: Negative.    Gastrointestinal: Negative.    Endocrine: Negative.     Genitourinary: Negative.    Musculoskeletal: Negative.    Skin:  Negative.   Neurological: Negative.    Hematological: Negative.    Psychiatric/Behavioral: Negative.    Breast: negative.    All other systems reviewed and are negative.     Objective:     Vital Signs (Most Recent):  Temp: 98.4 °F (36.9 °C) (18 1902) Vital Signs (24h Range):  Temp:  [98.4 °F (36.9 °C)-99.4 °F (37.4 °C)] 98.4 °F (36.9 °C)     Weight: 121.6 kg (268 lb)  Body mass index is 39.58 kg/m².    FHT: 140Cat 1 (reassuring)  TOCO:  Q 30 minutes    Physical Exam:   Constitutional: She is oriented to person, place, and time. She appears well-developed and well-nourished.     Eyes: Conjunctivae are normal. Pupils are equal, round, and reactive to light.    Neck: Normal range of motion.    Cardiovascular: Normal rate and regular rhythm.     Pulmonary/Chest: Breath sounds normal.        Abdominal: Soft.     Genitourinary: Vagina normal and uterus normal.           Musculoskeletal: Normal range of motion and moves all extremeties.       Neurological: She is alert and oriented to person, place, and time.    Skin: Skin is warm.    Psychiatric: She has a normal mood and affect. Her behavior is normal. Thought content normal.       Cervix:  Dilation:    Effacement:    Station:   Presentation:     Significant Labs:  Lab Results   Component Value Date    GROUPTRH A POS 2017    HEPBSAG Negative 2017    STREPBCULT No Group B Streptococcus isolated 2015       I have personallly reviewed all pertinent lab results from the last 24 hours.    Assessment/Plan:     32 y.o. female  at 30w1d for:    Threatened  labor    A IUP at 30w  URI with cough  Frequency  P urine  Sayra Graff CNM  Obstetrics  [facility]

## 2018-01-04 NOTE — NURSING
Pt on continuous EFM and toco monitor. No contractions noted. FHT baseline @150bpm. Reactive NST. Benzonatate capsule 100mg PO given.

## 2018-01-04 NOTE — SUBJECTIVE & OBJECTIVE
Obstetric HPI:  Patient reports None contractions, active fetal movement, No vaginal bleeding , No loss of fluid     This pregnancy has been complicated by grand multiparity    Obstetric History       T3      L3     SAB2   TAB0   Ectopic0   Multiple0   Live Births3       # Outcome Date GA Lbr Drake/2nd Weight Sex Delivery Anes PTL Lv   6 Current            5 Term 02/10/15 40w0d  3.912 kg (8 lb 10 oz) F Vag-Spont EPI N NOAM   4 Term 03 40w0d 01:00 3.969 kg (8 lb 12 oz) F Vag-Spont None N NOAM   3 Term 99 40w0d 12:00 4.252 kg (9 lb 6 oz) M Vag-Spont None N NOAM   2 SAB            1 SAB                 Past Medical History:   Diagnosis Date    Abnormal Pap smear 2005    colposcopy     Past Surgical History:   Procedure Laterality Date    Right Knee         PTA Medications   Medication Sig    albuterol 90 mcg/actuation inhaler Inhale 1-2 puffs into the lungs every 6 (six) hours as needed for Wheezing.    PRENATAL 105-IRON-FOLIC AC-DHA ORAL Take by mouth.       Review of patient's allergies indicates:  No Known Allergies     Family History     Problem Relation (Age of Onset)    Hypertension Maternal Grandfather, Mother, Maternal Uncle, Maternal Uncle        Social History Main Topics    Smoking status: Never Smoker    Smokeless tobacco: Never Used    Alcohol use No    Drug use: No    Sexual activity: Yes     Partners: Male     Birth control/ protection: None     Review of Systems   Constitutional: Negative.    HENT: Negative for mouth sores.    Eyes: Negative.    Respiratory: Positive for cough.    Cardiovascular: Negative.    Gastrointestinal: Negative.    Endocrine: Negative.    Genitourinary: Negative.    Musculoskeletal: Negative.    Skin:  Negative.   Neurological: Negative.    Hematological: Negative.    Psychiatric/Behavioral: Negative.    Breast: negative.    All other systems reviewed and are negative.     Objective:     Vital Signs (Most Recent):  Temp: 98.4 °F (36.9 °C)  (01/03/18 1902) Vital Signs (24h Range):  Temp:  [98.4 °F (36.9 °C)-99.4 °F (37.4 °C)] 98.4 °F (36.9 °C)     Weight: 121.6 kg (268 lb)  Body mass index is 39.58 kg/m².    FHT: 140Cat 1 (reassuring)  TOCO:  Q 30 minutes    Physical Exam:   Constitutional: She is oriented to person, place, and time. She appears well-developed and well-nourished.     Eyes: Conjunctivae are normal. Pupils are equal, round, and reactive to light.    Neck: Normal range of motion.    Cardiovascular: Normal rate and regular rhythm.     Pulmonary/Chest: Breath sounds normal.        Abdominal: Soft.     Genitourinary: Vagina normal and uterus normal.           Musculoskeletal: Normal range of motion and moves all extremeties.       Neurological: She is alert and oriented to person, place, and time.    Skin: Skin is warm.    Psychiatric: She has a normal mood and affect. Her behavior is normal. Thought content normal.       Cervix:  Dilation:    Effacement:    Station:   Presentation:     Significant Labs:  Lab Results   Component Value Date    GROUPTRH A POS 07/27/2017    HEPBSAG Negative 07/27/2017    STREPBCULT No Group B Streptococcus isolated 01/08/2015       I have personallly reviewed all pertinent lab results from the last 24 hours.

## 2018-01-04 NOTE — PROCEDURES
"Pranay Piña is a 32 y.o. female patient.    Temp: 98.4 °F (36.9 °C) (01/03/18 1902)  Weight: 121.6 kg (268 lb) (01/03/18 1834)  Height: 5' 9" (175.3 cm) (01/03/18 1834)       Obtain Fetal nonstress test (NST)  Date/Time: 1/3/2018 8:02 PM  Performed by: JO KERN  Authorized by: JO KERN     Nonstress Test:     Variability:  6-25 BPM    Decelerations:  Variable    Accelerations:  15 bpm    Acoustic Stimulator: No      Baseline:  140    Uterine Irritability: No      Contractions:  Not present  Biophysical Profile:     Nonstress Test Interpretation: reactive      Overall Impression:  Reassuring  Post-procedure:     Patient tolerance:  Patient tolerated the procedure well with no immediate complications        Jo Kern  1/3/2018    "

## 2018-01-04 NOTE — DISCHARGE SUMMARY
[facility]  Obstetrics  Discharge Summary      Patient Name: Pranay Piña  MRN: [id number]  Admission Date: 1/3/2018  Hospital Length of Stay: 0 days  Discharge Date and Time:  2018 8:11 PM  Attending Physician: Cecille Leal MD   Discharging Provider: Jo Graff CNM  Primary Care Provider: Primary Doctor No    HPI:  IUP at 30w1d    * No surgery found *     Hospital Course:   Here c/o vaginal pressure   Has URI with a cough        Final Active Diagnoses:    Diagnosis Date Noted POA    PRINCIPAL PROBLEM:  Threatened premature labor in third trimester [O47.03] 2018 Yes      Problems Resolved During this Admission:    Diagnosis Date Noted Date Resolved POA            Feeding Method: breast    Immunizations     None          This patient has no babies on file.  Pending Diagnostic Studies:     None          Discharged Condition: good    Disposition: Home or Self Care    Follow Up:  Follow-up Information     Follow up In 1 week.               Patient Instructions:     Activity as tolerated       Medications:  Current Discharge Medication List      START taking these medications    Details   benzonatate (TESSALON) 100 MG capsule Take 1 capsule (100 mg total) by mouth 3 (three) times daily as needed for Cough.  Qty: 30 capsule, Refills: 0         CONTINUE these medications which have NOT CHANGED    Details   albuterol 90 mcg/actuation inhaler Inhale 1-2 puffs into the lungs every 6 (six) hours as needed for Wheezing.  Qty: 1 Inhaler, Refills: 0      PRENATAL 105-IRON-FOLIC AC-DHA ORAL Take by mouth.             Jo Graff CNM  Obstetrics  [facility]

## 2018-01-05 ENCOUNTER — ROUTINE PRENATAL (OUTPATIENT)
Dept: OBSTETRICS AND GYNECOLOGY | Facility: CLINIC | Age: 33
End: 2018-01-05
Payer: MEDICAID

## 2018-01-05 VITALS
WEIGHT: 266.75 LBS | DIASTOLIC BLOOD PRESSURE: 80 MMHG | BODY MASS INDEX: 39.39 KG/M2 | SYSTOLIC BLOOD PRESSURE: 112 MMHG

## 2018-01-05 DIAGNOSIS — O09.40 GRAND MULTIPARITY, WITH CURRENT PREGNANCY, ANTEPARTUM: ICD-10-CM

## 2018-01-05 DIAGNOSIS — O26.841 UTERINE SIZE DATE DISCREPANCY, FIRST TRIMESTER: Primary | ICD-10-CM

## 2018-01-05 PROCEDURE — 99999 PR PBB SHADOW E&M-EST. PATIENT-LVL II: CPT | Mod: PBBFAC,,, | Performed by: ADVANCED PRACTICE MIDWIFE

## 2018-01-05 PROCEDURE — 99212 OFFICE O/P EST SF 10 MIN: CPT | Mod: PBBFAC | Performed by: ADVANCED PRACTICE MIDWIFE

## 2018-01-05 PROCEDURE — 99213 OFFICE O/P EST LOW 20 MIN: CPT | Mod: TH,S$PBB,, | Performed by: ADVANCED PRACTICE MIDWIFE

## 2018-01-05 NOTE — PROGRESS NOTES
""head cold", + congestion, cough, no fever, will try Robitussin, Mucinex, benadryl for sleep. Reviewed labs. Good FM, S>D will do US next OV check growth. Dealing with lots of stress. Her twin sister was murdered just before Accident. Pt is caring for her 4yo, 10yo and 11yo children. [place] office has been helpful. Support provided, offered referral to , pt declines at this time.   Coffective counseling sheet Protect Breastfeeding discussed with mother. Reinforced avoidence of artifical nipples and formula, unless medically indicated.  Encouraged mother to download Coffective mobile harvey if she has not already done so. Mother verbalizes understanding. al  "

## 2018-01-17 ENCOUNTER — PATIENT MESSAGE (OUTPATIENT)
Dept: OBSTETRICS AND GYNECOLOGY | Facility: CLINIC | Age: 33
End: 2018-01-17

## 2018-01-17 NOTE — TELEPHONE ENCOUNTER
Called pt to r/s appt. Not able to leave VM. R/s to next available on 1/18/18. Sent pt a [employer] message as well. john

## 2018-01-18 ENCOUNTER — PROCEDURE VISIT (OUTPATIENT)
Dept: OBSTETRICS AND GYNECOLOGY | Facility: CLINIC | Age: 33
End: 2018-01-18
Payer: MEDICAID

## 2018-01-18 ENCOUNTER — ROUTINE PRENATAL (OUTPATIENT)
Dept: OBSTETRICS AND GYNECOLOGY | Facility: CLINIC | Age: 33
End: 2018-01-18
Payer: MEDICAID

## 2018-01-18 ENCOUNTER — TELEPHONE (OUTPATIENT)
Dept: OBSTETRICS AND GYNECOLOGY | Facility: CLINIC | Age: 33
End: 2018-01-18

## 2018-01-18 VITALS — SYSTOLIC BLOOD PRESSURE: 118 MMHG | BODY MASS INDEX: 40.3 KG/M2 | WEIGHT: 272.94 LBS | DIASTOLIC BLOOD PRESSURE: 78 MMHG

## 2018-01-18 DIAGNOSIS — O09.40 GRAND MULTIPARITY, WITH CURRENT PREGNANCY, ANTEPARTUM: Primary | ICD-10-CM

## 2018-01-18 DIAGNOSIS — O99.213 OBESITY AFFECTING PREGNANCY IN THIRD TRIMESTER: ICD-10-CM

## 2018-01-18 DIAGNOSIS — O26.841 UTERINE SIZE DATE DISCREPANCY, FIRST TRIMESTER: ICD-10-CM

## 2018-01-18 PROCEDURE — 99213 OFFICE O/P EST LOW 20 MIN: CPT | Mod: TH,S$PBB,, | Performed by: ADVANCED PRACTICE MIDWIFE

## 2018-01-18 PROCEDURE — 99999 PR PBB SHADOW E&M-EST. PATIENT-LVL II: CPT | Mod: PBBFAC,,, | Performed by: ADVANCED PRACTICE MIDWIFE

## 2018-01-18 PROCEDURE — 76819 FETAL BIOPHYS PROFIL W/O NST: CPT | Mod: 26,S$PBB,, | Performed by: OBSTETRICS & GYNECOLOGY

## 2018-01-18 PROCEDURE — 76819 FETAL BIOPHYS PROFIL W/O NST: CPT | Mod: PBBFAC | Performed by: OBSTETRICS & GYNECOLOGY

## 2018-01-18 PROCEDURE — 76816 OB US FOLLOW-UP PER FETUS: CPT | Mod: PBBFAC | Performed by: OBSTETRICS & GYNECOLOGY

## 2018-01-18 PROCEDURE — 99212 OFFICE O/P EST SF 10 MIN: CPT | Mod: PBBFAC,TH,25 | Performed by: ADVANCED PRACTICE MIDWIFE

## 2018-01-18 PROCEDURE — 76816 OB US FOLLOW-UP PER FETUS: CPT | Mod: 26,S$PBB,, | Performed by: OBSTETRICS & GYNECOLOGY

## 2018-01-18 NOTE — TELEPHONE ENCOUNTER
Pt called wanting to know if she would still be able to have her u/s today. Discussed with [employer]/s tech, and was told to inform the pt to come in now. Also informed pt that she does not have to leave and come back for her 3pm appt, she will be worked in. Pt verbalized understanding.

## 2018-01-30 ENCOUNTER — ROUTINE PRENATAL (OUTPATIENT)
Dept: OBSTETRICS AND GYNECOLOGY | Facility: CLINIC | Age: 33
End: 2018-01-30
Payer: MEDICAID

## 2018-01-30 VITALS
DIASTOLIC BLOOD PRESSURE: 74 MMHG | WEIGHT: 267.88 LBS | BODY MASS INDEX: 39.56 KG/M2 | SYSTOLIC BLOOD PRESSURE: 118 MMHG

## 2018-01-30 DIAGNOSIS — O09.40 GRAND MULTIPARITY, WITH CURRENT PREGNANCY, ANTEPARTUM: Primary | ICD-10-CM

## 2018-01-30 PROCEDURE — 3008F BODY MASS INDEX DOCD: CPT | Mod: ,,, | Performed by: ADVANCED PRACTICE MIDWIFE

## 2018-01-30 PROCEDURE — 99999 PR PBB SHADOW E&M-EST. PATIENT-LVL II: CPT | Mod: PBBFAC,,, | Performed by: ADVANCED PRACTICE MIDWIFE

## 2018-01-30 PROCEDURE — 99212 OFFICE O/P EST SF 10 MIN: CPT | Mod: PBBFAC,TH | Performed by: ADVANCED PRACTICE MIDWIFE

## 2018-01-30 PROCEDURE — 99213 OFFICE O/P EST LOW 20 MIN: CPT | Mod: TH,S$PBB,, | Performed by: ADVANCED PRACTICE MIDWIFE

## 2018-01-30 NOTE — PROGRESS NOTES
Doing well, trying to avoid the flu. Denies problems, states good FM, no PTL s/s. US next appt for growth.  GBS next OV. al

## 2018-02-14 ENCOUNTER — ROUTINE PRENATAL (OUTPATIENT)
Dept: OBSTETRICS AND GYNECOLOGY | Facility: CLINIC | Age: 33
End: 2018-02-14
Payer: MEDICAID

## 2018-02-14 VITALS
BODY MASS INDEX: 39.88 KG/M2 | DIASTOLIC BLOOD PRESSURE: 70 MMHG | WEIGHT: 270.06 LBS | SYSTOLIC BLOOD PRESSURE: 118 MMHG

## 2018-02-14 DIAGNOSIS — O09.40 GRAND MULTIPARITY, WITH CURRENT PREGNANCY, ANTEPARTUM: Primary | ICD-10-CM

## 2018-02-14 DIAGNOSIS — O26.849 SIGNIFICANT DISCREPANCY BETWEEN UTERINE SIZE AND CLINICAL DATES, ANTEPARTUM: ICD-10-CM

## 2018-02-14 PROBLEM — O47.03 THREATENED PREMATURE LABOR IN THIRD TRIMESTER: Status: RESOLVED | Noted: 2018-01-03 | Resolved: 2018-02-14

## 2018-02-14 PROCEDURE — 99999 PR PBB SHADOW E&M-EST. PATIENT-LVL III: CPT | Mod: PBBFAC,,, | Performed by: ADVANCED PRACTICE MIDWIFE

## 2018-02-14 PROCEDURE — 87081 CULTURE SCREEN ONLY: CPT

## 2018-02-14 PROCEDURE — 3008F BODY MASS INDEX DOCD: CPT | Mod: ,,, | Performed by: ADVANCED PRACTICE MIDWIFE

## 2018-02-14 PROCEDURE — 87184 SC STD DISK METHOD PER PLATE: CPT

## 2018-02-14 PROCEDURE — 99213 OFFICE O/P EST LOW 20 MIN: CPT | Mod: PBBFAC,TH | Performed by: ADVANCED PRACTICE MIDWIFE

## 2018-02-14 PROCEDURE — 99213 OFFICE O/P EST LOW 20 MIN: CPT | Mod: TH,S$PBB,, | Performed by: ADVANCED PRACTICE MIDWIFE

## 2018-02-14 PROCEDURE — 87147 CULTURE TYPE IMMUNOLOGIC: CPT

## 2018-02-14 NOTE — PROGRESS NOTES
Doing well  US nv for size/date discrepancy  VE per pt request  GBS collected today  PTL labor precautions discussed and when to go to hospital    PIERO Johnson

## 2018-02-19 LAB — BACTERIA SPEC AEROBE CULT: NORMAL

## 2018-02-20 PROBLEM — Z22.330 GBS CARRIER: Status: ACTIVE | Noted: 2018-02-20

## 2018-02-21 ENCOUNTER — ROUTINE PRENATAL (OUTPATIENT)
Dept: OBSTETRICS AND GYNECOLOGY | Facility: CLINIC | Age: 33
End: 2018-02-21
Payer: MEDICAID

## 2018-02-21 ENCOUNTER — PROCEDURE VISIT (OUTPATIENT)
Dept: OBSTETRICS AND GYNECOLOGY | Facility: CLINIC | Age: 33
End: 2018-02-21
Payer: MEDICAID

## 2018-02-21 VITALS
SYSTOLIC BLOOD PRESSURE: 126 MMHG | WEIGHT: 272.25 LBS | DIASTOLIC BLOOD PRESSURE: 84 MMHG | BODY MASS INDEX: 40.21 KG/M2

## 2018-02-21 DIAGNOSIS — O09.40 GRAND MULTIPARITY, WITH CURRENT PREGNANCY, ANTEPARTUM: Primary | ICD-10-CM

## 2018-02-21 DIAGNOSIS — O26.849 SIGNIFICANT DISCREPANCY BETWEEN UTERINE SIZE AND CLINICAL DATES, ANTEPARTUM: ICD-10-CM

## 2018-02-21 PROCEDURE — 99213 OFFICE O/P EST LOW 20 MIN: CPT | Mod: TH,S$PBB,, | Performed by: ADVANCED PRACTICE MIDWIFE

## 2018-02-21 PROCEDURE — 99212 OFFICE O/P EST SF 10 MIN: CPT | Mod: PBBFAC,TH,25 | Performed by: ADVANCED PRACTICE MIDWIFE

## 2018-02-21 PROCEDURE — 76819 FETAL BIOPHYS PROFIL W/O NST: CPT | Mod: 26,S$PBB,, | Performed by: OBSTETRICS & GYNECOLOGY

## 2018-02-21 PROCEDURE — 76816 OB US FOLLOW-UP PER FETUS: CPT | Mod: 26,S$PBB,, | Performed by: OBSTETRICS & GYNECOLOGY

## 2018-02-21 PROCEDURE — 99999 PR PBB SHADOW E&M-EST. PATIENT-LVL II: CPT | Mod: PBBFAC,,, | Performed by: ADVANCED PRACTICE MIDWIFE

## 2018-02-21 PROCEDURE — 76819 FETAL BIOPHYS PROFIL W/O NST: CPT | Mod: PBBFAC | Performed by: OBSTETRICS & GYNECOLOGY

## 2018-02-21 PROCEDURE — 76816 OB US FOLLOW-UP PER FETUS: CPT | Mod: PBBFAC | Performed by: OBSTETRICS & GYNECOLOGY

## 2018-02-21 PROCEDURE — 3008F BODY MASS INDEX DOCD: CPT | Mod: ,,, | Performed by: ADVANCED PRACTICE MIDWIFE

## 2018-02-21 NOTE — PROGRESS NOTES
Doing well, c/o occasional pelvic pain, suggestions given  US for size/dates discrepancy, BPP 8/8, MVP 6.1cm, ENE 15.3cm EFW 7lb 13oz 80%, vertex presentation, reviewed with pt.   GBS positive, pt. Aware  Labor precautions/FKCs discussed and when to go to hospital    PIERO Valdovinos

## 2018-02-27 ENCOUNTER — ROUTINE PRENATAL (OUTPATIENT)
Dept: OBSTETRICS AND GYNECOLOGY | Facility: CLINIC | Age: 33
End: 2018-02-27
Payer: MEDICAID

## 2018-02-27 VITALS
WEIGHT: 273.13 LBS | SYSTOLIC BLOOD PRESSURE: 122 MMHG | BODY MASS INDEX: 40.34 KG/M2 | DIASTOLIC BLOOD PRESSURE: 72 MMHG

## 2018-02-27 DIAGNOSIS — O09.40 GRAND MULTIPARITY, WITH CURRENT PREGNANCY, ANTEPARTUM: Primary | ICD-10-CM

## 2018-02-27 PROCEDURE — 3008F BODY MASS INDEX DOCD: CPT | Mod: ,,, | Performed by: ADVANCED PRACTICE MIDWIFE

## 2018-02-27 PROCEDURE — 99213 OFFICE O/P EST LOW 20 MIN: CPT | Mod: TH,S$PBB,, | Performed by: ADVANCED PRACTICE MIDWIFE

## 2018-02-27 PROCEDURE — 99212 OFFICE O/P EST SF 10 MIN: CPT | Mod: PBBFAC,TH | Performed by: ADVANCED PRACTICE MIDWIFE

## 2018-02-27 PROCEDURE — 99999 PR PBB SHADOW E&M-EST. PATIENT-LVL II: CPT | Mod: PBBFAC,,, | Performed by: ADVANCED PRACTICE MIDWIFE

## 2018-02-27 NOTE — PROGRESS NOTES
Doing well  VE per pt. Request  Encouraged hydration  PP Birth control options discussed, pt. Desires depo provera  Labor precautions/FKCs discussed    PIERO Valdovinos

## 2018-03-05 ENCOUNTER — ROUTINE PRENATAL (OUTPATIENT)
Dept: OBSTETRICS AND GYNECOLOGY | Facility: CLINIC | Age: 33
End: 2018-03-05
Payer: MEDICAID

## 2018-03-05 VITALS — BODY MASS INDEX: 40.5 KG/M2 | SYSTOLIC BLOOD PRESSURE: 116 MMHG | WEIGHT: 274.25 LBS | DIASTOLIC BLOOD PRESSURE: 72 MMHG

## 2018-03-05 DIAGNOSIS — O09.40 GRAND MULTIPARITY, WITH CURRENT PREGNANCY, ANTEPARTUM: Primary | ICD-10-CM

## 2018-03-05 PROCEDURE — 99212 OFFICE O/P EST SF 10 MIN: CPT | Mod: PBBFAC,TH | Performed by: ADVANCED PRACTICE MIDWIFE

## 2018-03-05 PROCEDURE — 99213 OFFICE O/P EST LOW 20 MIN: CPT | Mod: TH,S$PBB,, | Performed by: ADVANCED PRACTICE MIDWIFE

## 2018-03-05 PROCEDURE — 99999 PR PBB SHADOW E&M-EST. PATIENT-LVL II: CPT | Mod: PBBFAC,,, | Performed by: ADVANCED PRACTICE MIDWIFE

## 2018-03-05 NOTE — PROGRESS NOTES
C/o abd pain, reassured, reviewed s/s of labor. Baby's head on pubis, feels OP, position changes recommended. al

## 2018-03-12 ENCOUNTER — HOSPITAL ENCOUNTER (INPATIENT)
Facility: HOSPITAL | Age: 33
LOS: 2 days | Discharge: HOME | End: 2018-03-14
Attending: OBSTETRICS & GYNECOLOGY | Admitting: OBSTETRICS & GYNECOLOGY
Payer: MEDICAID

## 2018-03-12 ENCOUNTER — ROUTINE PRENATAL (OUTPATIENT)
Dept: OBSTETRICS AND GYNECOLOGY | Facility: CLINIC | Age: 33
End: 2018-03-12
Payer: MEDICAID

## 2018-03-12 ENCOUNTER — ANESTHESIA EVENT (OUTPATIENT)
Dept: OBSTETRICS AND GYNECOLOGY | Facility: HOSPITAL | Age: 33
End: 2018-03-12
Payer: MEDICAID

## 2018-03-12 ENCOUNTER — ANESTHESIA (OUTPATIENT)
Dept: OBSTETRICS AND GYNECOLOGY | Facility: HOSPITAL | Age: 33
End: 2018-03-12
Payer: MEDICAID

## 2018-03-12 VITALS
BODY MASS INDEX: 40.92 KG/M2 | DIASTOLIC BLOOD PRESSURE: 80 MMHG | WEIGHT: 277.13 LBS | SYSTOLIC BLOOD PRESSURE: 142 MMHG

## 2018-03-12 DIAGNOSIS — O13.3 PREGNANCY-INDUCED HYPERTENSION IN THIRD TRIMESTER: ICD-10-CM

## 2018-03-12 DIAGNOSIS — O16.3 HIGH BLOOD PRESSURE AFFECTING PREGNANCY IN THIRD TRIMESTER, ANTEPARTUM: ICD-10-CM

## 2018-03-12 DIAGNOSIS — O09.40 GRAND MULTIPARITY, WITH CURRENT PREGNANCY, ANTEPARTUM: Primary | ICD-10-CM

## 2018-03-12 PROBLEM — J45.909 ASTHMA AFFECTING PREGNANCY, ANTEPARTUM: Status: ACTIVE | Noted: 2018-03-12

## 2018-03-12 PROBLEM — O99.519 ASTHMA AFFECTING PREGNANCY, ANTEPARTUM: Status: ACTIVE | Noted: 2018-03-12

## 2018-03-12 LAB
ABO GROUP BLD: NORMAL
ALBUMIN SERPL BCP-MCNC: 2.6 G/DL
ALP SERPL-CCNC: 155 U/L
ALT SERPL W/O P-5'-P-CCNC: 10 U/L
ANION GAP SERPL CALC-SCNC: 8 MMOL/L
AST SERPL-CCNC: 19 U/L
BASOPHILS # BLD AUTO: 0.01 K/UL
BASOPHILS NFR BLD: 0.2 %
BILIRUB SERPL-MCNC: 0.4 MG/DL
BLD GP AB SCN CELLS X3 SERPL QL: NORMAL
BUN SERPL-MCNC: 5 MG/DL
CALCIUM SERPL-MCNC: 8.3 MG/DL
CHLORIDE SERPL-SCNC: 108 MMOL/L
CO2 SERPL-SCNC: 22 MMOL/L
CREAT SERPL-MCNC: 0.6 MG/DL
CREAT UR-MCNC: 82.2 MG/DL
DIFFERENTIAL METHOD: ABNORMAL
EOSINOPHIL # BLD AUTO: 0 K/UL
EOSINOPHIL NFR BLD: 0.8 %
ERYTHROCYTE [DISTWIDTH] IN BLOOD BY AUTOMATED COUNT: 15.3 %
EST. GFR  (AFRICAN AMERICAN): >60 ML/MIN/1.73 M^2
EST. GFR  (NON AFRICAN AMERICAN): >60 ML/MIN/1.73 M^2
GLUCOSE SERPL-MCNC: 78 MG/DL
HCT VFR BLD AUTO: 32.7 %
HGB BLD-MCNC: 10.8 G/DL
LYMPHOCYTES # BLD AUTO: 1.2 K/UL
LYMPHOCYTES NFR BLD: 24.4 %
MCH RBC QN AUTO: 30.9 PG
MCHC RBC AUTO-ENTMCNC: 33 G/DL
MCV RBC AUTO: 93 FL
MONOCYTES # BLD AUTO: 0.5 K/UL
MONOCYTES NFR BLD: 10.8 %
NEUTROPHILS # BLD AUTO: 3.2 K/UL
NEUTROPHILS NFR BLD: 63.8 %
PLATELET # BLD AUTO: 270 K/UL
PMV BLD AUTO: 10.4 FL
POTASSIUM SERPL-SCNC: 3.4 MMOL/L
PROT SERPL-MCNC: 6.4 G/DL
PROT UR-MCNC: 9 MG/DL
PROT/CREAT RATIO, UR: 0.11
RBC # BLD AUTO: 3.5 M/UL
RH BLD: NORMAL
SODIUM SERPL-SCNC: 138 MMOL/L
WBC # BLD AUTO: 4.99 K/UL

## 2018-03-12 PROCEDURE — 99212 OFFICE O/P EST SF 10 MIN: CPT | Mod: PBBFAC,TH,25 | Performed by: ADVANCED PRACTICE MIDWIFE

## 2018-03-12 PROCEDURE — 86850 RBC ANTIBODY SCREEN: CPT

## 2018-03-12 PROCEDURE — 63600175 PHARM REV CODE 636 W HCPCS: Performed by: ADVANCED PRACTICE MIDWIFE

## 2018-03-12 PROCEDURE — 72200005 HC VAGINAL DELIVERY LEVEL II

## 2018-03-12 PROCEDURE — 62326 NJX INTERLAMINAR LMBR/SAC: CPT | Performed by: ANESTHESIOLOGY

## 2018-03-12 PROCEDURE — 80053 COMPREHEN METABOLIC PANEL: CPT

## 2018-03-12 PROCEDURE — 25000003 PHARM REV CODE 250: Performed by: NURSE ANESTHETIST, CERTIFIED REGISTERED

## 2018-03-12 PROCEDURE — 85025 COMPLETE CBC W/AUTO DIFF WBC: CPT

## 2018-03-12 PROCEDURE — 99213 OFFICE O/P EST LOW 20 MIN: CPT | Mod: TH,S$PBB,, | Performed by: ADVANCED PRACTICE MIDWIFE

## 2018-03-12 PROCEDURE — 25000003 PHARM REV CODE 250: Performed by: ADVANCED PRACTICE MIDWIFE

## 2018-03-12 PROCEDURE — 10907ZC DRAINAGE OF AMNIOTIC FLUID, THERAPEUTIC FROM PRODUCTS OF CONCEPTION, VIA NATURAL OR ARTIFICIAL OPENING: ICD-10-PCS | Performed by: OBSTETRICS & GYNECOLOGY

## 2018-03-12 PROCEDURE — 11000001 HC ACUTE MED/SURG PRIVATE ROOM

## 2018-03-12 PROCEDURE — 63600175 PHARM REV CODE 636 W HCPCS: Performed by: NURSE ANESTHETIST, CERTIFIED REGISTERED

## 2018-03-12 PROCEDURE — 27800517 HC TRAY,EPIDURAL-CONTINUOUS: Performed by: NURSE ANESTHETIST, CERTIFIED REGISTERED

## 2018-03-12 PROCEDURE — 82570 ASSAY OF URINE CREATININE: CPT

## 2018-03-12 PROCEDURE — 86900 BLOOD TYPING SEROLOGIC ABO: CPT

## 2018-03-12 PROCEDURE — 72100002 HC LABOR CARE, 1ST 8 HOURS

## 2018-03-12 PROCEDURE — 59409 OBSTETRICAL CARE: CPT | Mod: GB,,, | Performed by: ADVANCED PRACTICE MIDWIFE

## 2018-03-12 PROCEDURE — 86901 BLOOD TYPING SEROLOGIC RH(D): CPT

## 2018-03-12 PROCEDURE — 99999 PR PBB SHADOW E&M-EST. PATIENT-LVL II: CPT | Mod: PBBFAC,,, | Performed by: ADVANCED PRACTICE MIDWIFE

## 2018-03-12 RX ORDER — ACETAMINOPHEN 325 MG/1
650 TABLET ORAL EVERY 6 HOURS PRN
Status: DISCONTINUED | OUTPATIENT
Start: 2018-03-12 | End: 2018-03-14 | Stop reason: HOSPADM

## 2018-03-12 RX ORDER — MISOPROSTOL 200 UG/1
600 TABLET ORAL
Status: DISCONTINUED | OUTPATIENT
Start: 2018-03-12 | End: 2018-03-14 | Stop reason: HOSPADM

## 2018-03-12 RX ORDER — ALBUTEROL SULFATE 90 UG/1
2 AEROSOL, METERED RESPIRATORY (INHALATION) EVERY 6 HOURS PRN
Status: DISCONTINUED | OUTPATIENT
Start: 2018-03-12 | End: 2018-03-12

## 2018-03-12 RX ORDER — LIDOCAINE HYDROCHLORIDE AND EPINEPHRINE 15; 5 MG/ML; UG/ML
INJECTION, SOLUTION EPIDURAL
Status: DISCONTINUED | OUTPATIENT
Start: 2018-03-12 | End: 2018-03-12

## 2018-03-12 RX ORDER — FAMOTIDINE 10 MG/ML
20 INJECTION INTRAVENOUS ONCE
Status: DISCONTINUED | OUTPATIENT
Start: 2018-03-12 | End: 2018-03-14 | Stop reason: HOSPADM

## 2018-03-12 RX ORDER — ROPIVACAINE HYDROCHLORIDE 2 MG/ML
INJECTION, SOLUTION EPIDURAL; INFILTRATION CONTINUOUS
Status: DISCONTINUED | OUTPATIENT
Start: 2018-03-12 | End: 2018-03-14 | Stop reason: HOSPADM

## 2018-03-12 RX ORDER — HYDROCODONE BITARTRATE AND ACETAMINOPHEN 5; 325 MG/1; MG/1
1 TABLET ORAL EVERY 4 HOURS PRN
Status: DISCONTINUED | OUTPATIENT
Start: 2018-03-12 | End: 2018-03-14 | Stop reason: HOSPADM

## 2018-03-12 RX ORDER — IBUPROFEN 600 MG/1
600 TABLET ORAL EVERY 6 HOURS
Status: DISCONTINUED | OUTPATIENT
Start: 2018-03-13 | End: 2018-03-14 | Stop reason: HOSPADM

## 2018-03-12 RX ORDER — OXYTOCIN/RINGER'S LACTATE 20/1000 ML
2 PLASTIC BAG, INJECTION (ML) INTRAVENOUS CONTINUOUS
Status: DISCONTINUED | OUTPATIENT
Start: 2018-03-12 | End: 2018-03-14 | Stop reason: HOSPADM

## 2018-03-12 RX ORDER — SODIUM CITRATE AND CITRIC ACID MONOHYDRATE 334; 500 MG/5ML; MG/5ML
30 SOLUTION ORAL ONCE
Status: DISCONTINUED | OUTPATIENT
Start: 2018-03-12 | End: 2018-03-14 | Stop reason: HOSPADM

## 2018-03-12 RX ORDER — DIPHENHYDRAMINE HYDROCHLORIDE 50 MG/ML
25 INJECTION INTRAMUSCULAR; INTRAVENOUS EVERY 4 HOURS PRN
Status: DISCONTINUED | OUTPATIENT
Start: 2018-03-12 | End: 2018-03-14 | Stop reason: HOSPADM

## 2018-03-12 RX ORDER — ONDANSETRON 8 MG/1
8 TABLET, ORALLY DISINTEGRATING ORAL EVERY 8 HOURS PRN
Status: DISCONTINUED | OUTPATIENT
Start: 2018-03-12 | End: 2018-03-14 | Stop reason: HOSPADM

## 2018-03-12 RX ORDER — BUTORPHANOL TARTRATE 1 MG/ML
2 INJECTION INTRAMUSCULAR; INTRAVENOUS
Status: DISCONTINUED | OUTPATIENT
Start: 2018-03-12 | End: 2018-03-14 | Stop reason: HOSPADM

## 2018-03-12 RX ORDER — HYDROCORTISONE 25 MG/G
CREAM TOPICAL 3 TIMES DAILY PRN
Status: DISCONTINUED | OUTPATIENT
Start: 2018-03-12 | End: 2018-03-14 | Stop reason: HOSPADM

## 2018-03-12 RX ORDER — DIPHENHYDRAMINE HCL 25 MG
25 CAPSULE ORAL EVERY 4 HOURS PRN
Status: DISCONTINUED | OUTPATIENT
Start: 2018-03-12 | End: 2018-03-14 | Stop reason: HOSPADM

## 2018-03-12 RX ORDER — ALBUTEROL SULFATE 2.5 MG/.5ML
2.5 SOLUTION RESPIRATORY (INHALATION) EVERY 6 HOURS PRN
Status: DISCONTINUED | OUTPATIENT
Start: 2018-03-12 | End: 2018-03-14 | Stop reason: HOSPADM

## 2018-03-12 RX ORDER — SODIUM CHLORIDE, SODIUM LACTATE, POTASSIUM CHLORIDE, CALCIUM CHLORIDE 600; 310; 30; 20 MG/100ML; MG/100ML; MG/100ML; MG/100ML
INJECTION, SOLUTION INTRAVENOUS CONTINUOUS
Status: DISCONTINUED | OUTPATIENT
Start: 2018-03-12 | End: 2018-03-14 | Stop reason: HOSPADM

## 2018-03-12 RX ORDER — BUTORPHANOL TARTRATE 1 MG/ML
1 INJECTION INTRAMUSCULAR; INTRAVENOUS
Status: DISCONTINUED | OUTPATIENT
Start: 2018-03-12 | End: 2018-03-14 | Stop reason: HOSPADM

## 2018-03-12 RX ORDER — ROPIVACAINE HYDROCHLORIDE 2 MG/ML
INJECTION, SOLUTION EPIDURAL; INFILTRATION; PERINEURAL
Status: DISCONTINUED | OUTPATIENT
Start: 2018-03-12 | End: 2018-03-12

## 2018-03-12 RX ORDER — OXYTOCIN/RINGER'S LACTATE 20/1000 ML
41.65 PLASTIC BAG, INJECTION (ML) INTRAVENOUS CONTINUOUS
Status: DISPENSED | OUTPATIENT
Start: 2018-03-12 | End: 2018-03-13

## 2018-03-12 RX ORDER — DOCUSATE SODIUM 100 MG/1
200 CAPSULE, LIQUID FILLED ORAL 2 TIMES DAILY PRN
Status: DISCONTINUED | OUTPATIENT
Start: 2018-03-12 | End: 2018-03-14 | Stop reason: HOSPADM

## 2018-03-12 RX ORDER — ROPIVACAINE HYDROCHLORIDE 2 MG/ML
INJECTION, SOLUTION EPIDURAL; INFILTRATION; PERINEURAL CONTINUOUS PRN
Status: DISCONTINUED | OUTPATIENT
Start: 2018-03-12 | End: 2018-03-12

## 2018-03-12 RX ADMIN — LIDOCAINE HYDROCHLORIDE,EPINEPHRINE BITARTRATE 3 ML: 15; .005 INJECTION, SOLUTION EPIDURAL; INFILTRATION; INTRACAUDAL; PERINEURAL at 06:03

## 2018-03-12 RX ADMIN — ROPIVACAINE HYDROCHLORIDE 7 ML: 2 INJECTION, SOLUTION EPIDURAL; INFILTRATION at 06:03

## 2018-03-12 RX ADMIN — IBUPROFEN 600 MG: 600 TABLET, FILM COATED ORAL at 07:03

## 2018-03-12 RX ADMIN — SODIUM CHLORIDE, SODIUM LACTATE, POTASSIUM CHLORIDE, AND CALCIUM CHLORIDE 1000 ML: 600; 310; 30; 20 INJECTION, SOLUTION INTRAVENOUS at 05:03

## 2018-03-12 RX ADMIN — SODIUM CHLORIDE, POTASSIUM CHLORIDE, SODIUM LACTATE AND CALCIUM CHLORIDE: 600; 310; 30; 20 INJECTION, SOLUTION INTRAVENOUS at 12:03

## 2018-03-12 RX ADMIN — DEXTROSE 5 MILLION UNITS: 50 INJECTION, SOLUTION INTRAVENOUS at 12:03

## 2018-03-12 RX ADMIN — ROPIVACAINE HYDROCHLORIDE 14 ML/HR: 2 INJECTION, SOLUTION EPIDURAL; INFILTRATION at 06:03

## 2018-03-12 RX ADMIN — Medication 2.5 MILLION UNITS: at 04:03

## 2018-03-12 RX ADMIN — Medication 2 MILLI-UNITS/MIN: at 02:03

## 2018-03-12 NOTE — ANESTHESIA PREPROCEDURE EVALUATION
03/12/2018  Pranay Piña is a 33 y.o., female.    Pre-op Assessment    I have reviewed the Patient Summary Reports.     I have reviewed the Nursing Notes.   I have reviewed the Medications.     Review of Systems  Anesthesia Hx:  No problems with previous Anesthesia  History of prior surgery of interest to airway management or planning: Previous anesthesia: General Denies Family Hx of Anesthesia complications.   Denies Personal Hx of Anesthesia complications.   Social:  Non-Smoker, No Alcohol Use    Hematology/Oncology:  Hematology Normal        EENT/Dental:EENT/Dental Normal   Cardiovascular:  Cardiovascular Normal     Pulmonary:   Asthma    Renal/:  Renal/ Normal     Hepatic/GI:  Hepatic/GI Normal    Musculoskeletal:  Musculoskeletal Normal    Neurological:  Neurology Normal    Endocrine:  Endocrine Normal    Psych:  Psychiatric Normal           Physical Exam  General:  Morbid Obesity    Airway/Jaw/Neck:  Airway Findings: Mouth Opening: Normal Tongue: Normal  General Airway Assessment: Adult  Mallampati: III  Improves to III with phonation.  TM Distance: Normal, at least 6 cm       Chest/Lungs:  Chest/Lungs Findings: Normal Respiratory Rate     Heart/Vascular:  Heart Findings: Rate: Normal        Mental Status:  Mental Status Findings:  Cooperative, Alert and Oriented         Anesthesia Plan  Type of Anesthesia, risks & benefits discussed:  Anesthesia Type:  epidural  Patient's Preference:   Intra-op Monitoring Plan: standard ASA monitors  Intra-op Monitoring Plan Comments:   Post Op Pain Control Plan:   Post Op Pain Control Plan Comments:   Induction:    Beta Blocker:  Patient is not currently on a Beta-Blocker (No further documentation required).       Informed Consent: Patient understands risks and agrees with Anesthesia plan.  Questions answered. Anesthesia consent signed with  patient.  ASA Score: 2     Day of Surgery Review of History & Physical: I have interviewed and examined the patient. I have reviewed the patient's H&P dated:

## 2018-03-12 NOTE — HOSPITAL COURSE
Admit to LD for IOL d/t increased BP  PIH labs  GBS prophylaxis  Will begin Pitocin 2 hours after first dose of PCN  3/13/18 pp day 1, routine pp care  3/14/18 DC to home with 1 week fu bp check,

## 2018-03-12 NOTE — H&P
[facility] -   Obstetrics  History & Physical    Patient Name: Pranay Piña  MRN: [id number]  Admission Date: 3/12/2018  Primary Care Provider: Primary Doctor No    Subjective:     Principal Problem:High blood pressure affecting pregnancy in third trimester, antepartum    History of Present Illness:  Sent from office with increased Bp      Obstetric HPI:  Patient reports None contractions, active fetal movement, No vaginal bleeding , No loss of fluid     This pregnancy has been complicated by   GBS carrier, GBS prophylaxis  High blood pressure, IOL and PIH labs  Asthma, albuterol inhaler      Obstetric History       T3      L3     SAB2   TAB0   Ectopic0   Multiple0   Live Births3       # Outcome Date GA Lbr Drake/2nd Weight Sex Delivery Anes PTL Lv   6 Current            5 Term 02/10/15 40w0d  3.912 kg (8 lb 10 oz) F Vag-Spont EPI N NOAM   4 Term 03 40w0d 01:00 3.969 kg (8 lb 12 oz) F Vag-Spont None N NOAM   3 Term 99 40w0d 12:00 4.252 kg (9 lb 6 oz) M Vag-Spont None N NOAM   2 SAB            1 SAB                 Past Medical History:   Diagnosis Date    Abnormal Pap smear 2005    colposcopy    Asthma affecting pregnancy, antepartum 3/12/2018     Past Surgical History:   Procedure Laterality Date    Right Knee         PTA Medications   Medication Sig    albuterol 90 mcg/actuation inhaler Inhale 1-2 puffs into the lungs every 6 (six) hours as needed for Wheezing.    PRENATAL 105-IRON-FOLIC AC-DHA ORAL Take by mouth.       Review of patient's allergies indicates:  No Known Allergies     Family History     Problem Relation (Age of Onset)    Hypertension Maternal Grandfather, Mother, Maternal Uncle, Maternal Uncle        Social History Main Topics    Smoking status: Never Smoker    Smokeless tobacco: Never Used    Alcohol use No    Drug use: No    Sexual activity: Yes     Partners: Male     Birth control/ protection: None     Review of Systems   All other  systems reviewed and are negative.     Objective:     Vital Signs (Most Recent):    Vital Signs (24h Range):  BP: (142)/(80) 142/80        There is no height or weight on file to calculate BMI.    FHT: 125 Cat 1 (reassuring)  TOCO:  Q 8-10 minutes    Physical Exam:   Constitutional: She is oriented to person, place, and time. Vital signs are normal. She appears well-developed and well-nourished. She is cooperative.    HENT:   Head: Normocephalic and atraumatic.     Neck: Normal range of motion. Neck supple.     Pulmonary/Chest: Effort normal.        Abdominal: Soft.   Gravid, non-tender     Genitourinary: Cervix is normal.           Musculoskeletal: Normal range of motion and moves all extremeties.       Neurological: She is alert and oriented to person, place, and time. She has normal strength.    Skin: Skin is warm, dry and intact. Capillary refill takes less than 2 seconds.    Psychiatric: She has a normal mood and affect. Her speech is normal and behavior is normal. Judgment and thought content normal. Cognition and memory are normal.       Cervix: per CNM in office  Dilation:  4  Effacement:  80  Station: -2  Presentation: Vertex     Significant Labs:  Lab Results   Component Value Date    GROUPTRH A POS 2017    HEPBSAG Negative 2017    STREPBCULT  2018     STREPTOCOCCUS AGALACTIAE (GROUP B)  Beta-hemolytic streptococci are routinely susceptible to   penicillins,cephalosporins and carbapenems.         I have personallly reviewed all pertinent lab results from the last 24 hours.    Assessment/Plan:     33 y.o. female  at 39w6d for:    * High blood pressure affecting pregnancy in third trimester, antepartum    IOL for increased blood pressure  PIH labs          Asthma affecting pregnancy, antepartum    Albuterol inhaler PRN        GBS carrier    GBS prophylaxis            Geremias Ricardo CNM  Obstetrics  [facility]

## 2018-03-12 NOTE — SUBJECTIVE & OBJECTIVE
Obstetric HPI:  Patient reports None contractions, active fetal movement, No vaginal bleeding , No loss of fluid     This pregnancy has been complicated by   GBS carrier, GBS prophylaxis  High blood pressure, IOL and PIH labs  Asthma, albuterol inhaler      Obstetric History       T3      L3     SAB2   TAB0   Ectopic0   Multiple0   Live Births3       # Outcome Date GA Lbr Drake/2nd Weight Sex Delivery Anes PTL Lv   6 Current            5 Term 02/10/15 40w0d  3.912 kg (8 lb 10 oz) F Vag-Spont EPI N NOAM   4 Term 03 40w0d 01:00 3.969 kg (8 lb 12 oz) F Vag-Spont None N NOAM   3 Term 99 40w0d 12:00 4.252 kg (9 lb 6 oz) M Vag-Spont None N NOAM   2 SAB            1 SAB                 Past Medical History:   Diagnosis Date    Abnormal Pap smear 2005    colposcopy    Asthma affecting pregnancy, antepartum 3/12/2018     Past Surgical History:   Procedure Laterality Date    Right Knee         PTA Medications   Medication Sig    albuterol 90 mcg/actuation inhaler Inhale 1-2 puffs into the lungs every 6 (six) hours as needed for Wheezing.    PRENATAL 105-IRON-FOLIC AC-DHA ORAL Take by mouth.       Review of patient's allergies indicates:  No Known Allergies     Family History     Problem Relation (Age of Onset)    Hypertension Maternal Grandfather, Mother, Maternal Uncle, Maternal Uncle        Social History Main Topics    Smoking status: Never Smoker    Smokeless tobacco: Never Used    Alcohol use No    Drug use: No    Sexual activity: Yes     Partners: Male     Birth control/ protection: None     Review of Systems   All other systems reviewed and are negative.     Objective:     Vital Signs (Most Recent):    Vital Signs (24h Range):  BP: (142)/(80) 142/80        There is no height or weight on file to calculate BMI.    FHT: 125 Cat 1 (reassuring)  TOCO:  Q 8-10 minutes    Physical Exam:   Constitutional: She is oriented to person, place, and time. Vital signs are normal. She appears  well-developed and well-nourished. She is cooperative.    HENT:   Head: Normocephalic and atraumatic.     Neck: Normal range of motion. Neck supple.     Pulmonary/Chest: Effort normal.        Abdominal: Soft.   Gravid, non-tender     Genitourinary: Cervix is normal.           Musculoskeletal: Normal range of motion and moves all extremeties.       Neurological: She is alert and oriented to person, place, and time. She has normal strength.    Skin: Skin is warm, dry and intact. Capillary refill takes less than 2 seconds.    Psychiatric: She has a normal mood and affect. Her speech is normal and behavior is normal. Judgment and thought content normal. Cognition and memory are normal.       Cervix: per CNM in office  Dilation:  4  Effacement:  80  Station: -2  Presentation: Vertex     Significant Labs:  Lab Results   Component Value Date    GROUPTRH A POS 07/27/2017    HEPBSAG Negative 07/27/2017    STREPBCULT  02/14/2018     STREPTOCOCCUS AGALACTIAE (GROUP B)  Beta-hemolytic streptococci are routinely susceptible to   penicillins,cephalosporins and carbapenems.         I have personallly reviewed all pertinent lab results from the last 24 hours.

## 2018-03-12 NOTE — ANESTHESIA PROCEDURE NOTES
Epidural    Patient location during procedure: OB   Reason for block: primary anesthetic   Diagnosis: IUP with labor pain   Start time: 3/12/2018 5:52 PM  Timeout: 3/12/2018 5:51 PM  End time: 3/12/2018 6:08 PM  Staffing  Anesthesiologist: KELSEY SALAZAR  Resident/CRNA: DIPIKA MCINTOSH  Performed: anesthesiologist   Preanesthetic Checklist  Completed: patient identified, pre-op evaluation, timeout performed, IV checked, risks and benefits discussed, monitors and equipment checked, anesthesia consent given, hand hygiene performed and patient being monitored  Preparation  Patient position: sitting  Prep: Betadine  Patient monitoring: Pulse Ox and Blood Pressure  Epidural  Skin Anesthetic: lidocaine 1%  Skin Wheal: 3 mL  Administration type: continuous  Approach: midline  Interspace: L2-3  Injection technique: ANITA air and ANITA saline  Needle and Epidural Catheter  Needle type: Tuohy   Needle gauge: 18  Needle length: 3.5 inches  Needle insertion depth: 7.5 cm  Catheter type: multi-orifice  Catheter size: 20 G  Catheter at skin depth: 13 cm  Test dose: 3 mL of lidocaine 1.5% with Epi 1-to-200,000  Additional Documentation: incremental injection, negative aspiration for heme and CSF, no signs/symptoms of IV or SA injection, no paresthesia on injection, no significant pain on injection and no significant complaints from patient  Needle localization: anatomical landmarks  Medications:  Bolus administered: 15 mL of 0.2% ropivacaine  Epinephrine added: none  Assessment  Upper dermatomal levels - Left: T8  Right: T8  Ease of block: easy  Patient's tolerance of the procedure: comfortable throughout block and no complaints  Additional Notes  1st attempt +heme that would not clear.

## 2018-03-12 NOTE — PROGRESS NOTES
S: Doing well    O: VSS/AF   Cat 1 reassuring  [place] q 3-7 minutes  Ve: 3/70/-3, AROM clear fluid, pt. Handled well    A: IOL for increased blood pressure    P: Continue Pitocin IOL  Continue to monitor maternal and fetal status  Anticipate progression of labor and NVD    PIERO Valdovinos

## 2018-03-12 NOTE — L&D DELIVERY NOTE
[facility]  Vaginal Delivery   Operative Note    SUMMARY     Normal spontaneous vaginal delivery of live infant, was placed on mothers abdomen for skin to skin and bulb suctioning performed.  Infant delivered position OA over intact perineum.  Nuchal cord: Yes, cord reduced following delivery.    Spontaneous delivery of placenta and IV pitocin given noting good uterine tone.  No lacerations noted.  Patient tolerated delivery well. Sponge needle and lap counted correctly x2.    Indications: NVD (normal vaginal delivery)  Pregnancy complicated by:   Patient Active Problem List   Diagnosis    NVD (normal vaginal delivery)    Grand multiparity, with current pregnancy, antepartum    Significant discrepancy between uterine size and clinical dates, antepartum    GBS carrier    High blood pressure affecting pregnancy in third trimester, antepartum    Asthma affecting pregnancy, antepartum    Single liveborn     Admitting GA: 39w6d    Delivery Information for  Inocente Piña    Birth information:  YOB: 2020   Time of birth: 6:32 PM   Sex: female   Head Delivery Date/Time:     Delivery type:    Gestational Age: 39w6d              [place] Assessment    No data filed                          Interventions/Resuscitation         Cord    No data filed              Labor Events:       labor:       Labor Onset Date/Time:         Dilation Complete Date/Time:         Start Pushing Date/Time:       Rupture Date/Time:              Rupture type:           Fluid Amount:        Fluid Color:        Fluid Odor:        Membrane Status (PeriCalm): ARM (Artificial Rupture)      Rupture Date/Time (PeriCalm): 2018 16:48:00      Fluid Amount (PeriCalm): Small      Fluid Color (PeriCalm): Clear       steroids:       Antibiotics given for GBS:       Induction:       Indications for induction:        Augmentation:       Indications for augmentation:       Labor complications:        Additional complications:          Cervical ripening:                     Delivery:      Episiotomy:       Indication for Episiotomy:       Perineal Lacerations:   Repaired:      Periurethral Laceration:   Repaired:     Labial Laceration:   Repaired:     Sulcus Laceration:   Repaired:     Vaginal Laceration:   Repaired:     Cervical Laceration:   Repaired:     Repair suture:       Repair # of packets:       Vaginal delivery QBL (mL):        QBL (mL): 0     Combined Blood Loss (mL): 0     Vaginal Sweep Performed:       Surgicount Correct:         Other providers:            Details (if applicable):  Trial of Labor      Categorization:      Priority:     Indications for :     Incision Type:       Additional  information:  Forceps:    Vacuum:    Breech:    Observed anomalies    Other (Comments):         PIERO Valdovinos

## 2018-03-13 PROCEDURE — 25000003 PHARM REV CODE 250: Performed by: ADVANCED PRACTICE MIDWIFE

## 2018-03-13 PROCEDURE — 63600175 PHARM REV CODE 636 W HCPCS: Performed by: ADVANCED PRACTICE MIDWIFE

## 2018-03-13 PROCEDURE — 99231 SBSQ HOSP IP/OBS SF/LOW 25: CPT | Mod: ,,, | Performed by: ADVANCED PRACTICE MIDWIFE

## 2018-03-13 PROCEDURE — 11000001 HC ACUTE MED/SURG PRIVATE ROOM

## 2018-03-13 RX ORDER — MEDROXYPROGESTERONE ACETATE 150 MG/ML
150 INJECTION, SUSPENSION INTRAMUSCULAR ONCE
Status: COMPLETED | OUTPATIENT
Start: 2018-03-13 | End: 2018-03-13

## 2018-03-13 RX ADMIN — IBUPROFEN 600 MG: 600 TABLET, FILM COATED ORAL at 12:03

## 2018-03-13 RX ADMIN — IBUPROFEN 600 MG: 600 TABLET, FILM COATED ORAL at 11:03

## 2018-03-13 RX ADMIN — MEDROXYPROGESTERONE ACETATE 150 MG: 150 INJECTION, SUSPENSION, EXTENDED RELEASE INTRAMUSCULAR at 10:03

## 2018-03-13 RX ADMIN — IBUPROFEN 600 MG: 600 TABLET, FILM COATED ORAL at 06:03

## 2018-03-13 RX ADMIN — IBUPROFEN 600 MG: 600 TABLET, FILM COATED ORAL at 05:03

## 2018-03-13 NOTE — TRANSFER OF CARE
"Anesthesia Transfer of Care Note    Patient: Pranay Piña    Procedure(s) Performed: * No procedures listed *    Patient location: Labor and Delivery    Anesthesia Type: epidural    Post pain: adequate analgesia    Post assessment: no apparent anesthetic complications    Post vital signs: stable    Level of consciousness: awake, alert and oriented    Nausea/Vomiting: no nausea/vomiting    Complications: none    Transfer of care protocol was followed      Last vitals:   Visit Vitals  BP (!) 147/75   Pulse 69   Temp 36.8 °C (98.2 °F) (Oral)   Resp 18   Ht 5' 9" (1.753 m)   Wt 125.7 kg (277 lb 1.9 oz)   LMP 06/06/2017 (Approximate)   SpO2 97%   Breastfeeding? No   BMI 40.92 kg/m²     "

## 2018-03-13 NOTE — LACTATION NOTE
Visited parents in bedroom.   Mother has been formula feeding her baby since last night. When asked this morning, mother states that her goal was to breastfeed. When asked why the change of heart, mother states that she has inverted nipples, and that breastfeeding was very painful with her last baby.   Reviewed nipple care with mother, and what to expect the next 2 weeks with breastfeeding. Acknowledged mother's fears, and offered encouragement and active listening.   Mother provided with support; will decided about her feeding plan with her  this morning.

## 2018-03-13 NOTE — PLAN OF CARE
Problem: Patient Care Overview  Goal: Plan of Care Review  Outcome: Ongoing (interventions implemented as appropriate)  Pt progressing, bonding well with baby girl. VSS. Pain controlled with scheduled motrin. Ambulating and voiding without difficulty. Bottle feeding baby. Will continue to monitor progress.

## 2018-03-13 NOTE — SUBJECTIVE & OBJECTIVE
Hospital course: Admit to LD for IOL d/t increased BP  PIH labs  GBS prophylaxis  Will begin Pitocin 2 hours after first dose of PCN  3/13/18 pp day 1, routine pp care    Interval History:     She is doing well this morning. She is tolerating a regular diet without nausea or vomiting. She is voiding spontaneously. She is ambulating. She has passed flatus, and has a BM. Vaginal bleeding is mild. She denies fever or chills. Abdominal pain is mild and controlled with oral medications. She is not breastfeeding. She desires circumcision for her male baby: not applicable.    Objective:     Vital Signs (Most Recent):  Temp: 97.7 °F (36.5 °C) (03/13/18 0800)  Pulse: (!) 51 (03/13/18 0800)  Resp: 18 (03/13/18 0800)  BP: 131/85 (03/13/18 0800)  SpO2: 97 % (03/12/18 1902) Vital Signs (24h Range):  Temp:  [97.6 °F (36.4 °C)-98.5 °F (36.9 °C)] 97.7 °F (36.5 °C)  Pulse:  [49-80] 51  Resp:  [17-18] 18  SpO2:  [80 %-99 %] 97 %  BP: ()/() 131/85     Weight: 125.7 kg (277 lb 1.9 oz)  Body mass index is 40.92 kg/m².      Intake/Output Summary (Last 24 hours) at 03/13/18 0950  Last data filed at 03/13/18 0253   Gross per 24 hour   Intake                0 ml   Output              720 ml   Net             -720 ml       Significant Labs:  Lab Results   Component Value Date    GROUPTRH A POS 07/27/2017    HEPBSAG Negative 07/27/2017    STREPBCULT  02/14/2018     STREPTOCOCCUS AGALACTIAE (GROUP B)  Beta-hemolytic streptococci are routinely susceptible to   penicillins,cephalosporins and carbapenems.         Recent Labs  Lab 03/12/18  1135   HGB 10.8*   HCT 32.7*       Physical Exam:  General:    well developed, well nourished, no distress   Lungs:  clear to auscultation bilaterally   Heart:  regular rate and rhythm, S1, S2 normal, no murmur, click, rub or gallop   Breasts:  no discharge, erythema, or tenderness   Abdomen:  soft, non-tender; bowel sounds normal   Fundus:  firm   Lochia:  scant rubra   Episiotomy:   N/A   DVT  Evaluation:  No evidence of DVT on either side seen on physical exam.       Group & Rh  @LABGroup & Rh@  Rubella  No results for input(s): RUBELLAGSC in the last 168 hours.    ASSESSMENT/PLAN:     Status post vaginal delivery. Doing well.     Continue current care.    I have personallly reviewed all pertinent lab results from the last 24 hours.    Physical Exam

## 2018-03-13 NOTE — PROGRESS NOTES
[facility] -   Obstetrics  Postpartum Progress Note    Patient Name: Pranay Piña  MRN: [id number]  Admission Date: 3/12/2018  Hospital Length of Stay: 1 days  Attending Physician: Nasreen Ragland MD  Primary Care Provider: Primary Doctor No    Subjective:     Principal Problem:NVD (normal vaginal delivery)    Hospital course: Admit to LD for IOL d/t increased BP  PIH labs  GBS prophylaxis  Will begin Pitocin 2 hours after first dose of PCN  3/13/18 pp day 1, routine pp care    Interval History:     She is doing well this morning. She is tolerating a regular diet without nausea or vomiting. She is voiding spontaneously. She is ambulating. She has passed flatus, and has a BM. Vaginal bleeding is mild. She denies fever or chills. Abdominal pain is mild and controlled with oral medications. She is not breastfeeding. She desires circumcision for her male baby: not applicable.    Objective:     Vital Signs (Most Recent):  Temp: 97.7 °F (36.5 °C) (03/13/18 0800)  Pulse: (!) 51 (03/13/18 0800)  Resp: 18 (03/13/18 0800)  BP: 131/85 (03/13/18 0800)  SpO2: 97 % (03/12/18 1902) Vital Signs (24h Range):  Temp:  [97.6 °F (36.4 °C)-98.5 °F (36.9 °C)] 97.7 °F (36.5 °C)  Pulse:  [49-80] 51  Resp:  [17-18] 18  SpO2:  [80 %-99 %] 97 %  BP: ()/() 131/85     Weight: 125.7 kg (277 lb 1.9 oz)  Body mass index is 40.92 kg/m².      Intake/Output Summary (Last 24 hours) at 03/13/18 0950  Last data filed at 03/13/18 0253   Gross per 24 hour   Intake                0 ml   Output              720 ml   Net             -720 ml       Significant Labs:  Lab Results   Component Value Date    GROUPTRH A POS 07/27/2017    HEPBSAG Negative 07/27/2017    STREPBCULT  02/14/2018     STREPTOCOCCUS AGALACTIAE (GROUP B)  Beta-hemolytic streptococci are routinely susceptible to   penicillins,cephalosporins and carbapenems.         Recent Labs  Lab 03/12/18  1135   HGB 10.8*   HCT 32.7*       Physical Exam:  General:     well developed, well nourished, no distress   Lungs:  clear to auscultation bilaterally   Heart:  regular rate and rhythm, S1, S2 normal, no murmur, click, rub or gallop   Breasts:  no discharge, erythema, or tenderness   Abdomen:  soft, non-tender; bowel sounds normal   Fundus:  firm   Lochia:  scant rubra   Episiotomy:   N/A   DVT Evaluation:  No evidence of DVT on either side seen on physical exam.       Group & Rh  @LABGroup & Rh@  Rubella  No results for input(s): RUBELLAIGGSC in the last 168 hours.    ASSESSMENT/PLAN:     Status post vaginal delivery. Doing well.     Continue current care.    I have personallly reviewed all pertinent lab results from the last 24 hours.    Physical Exam    Assessment/Plan:     33 y.o. female  for:    * NVD (normal vaginal delivery)    Routine PP care        GBS carrier    GBS prophylaxis        High blood pressure affecting pregnancy in third trimester, antepartum    Normotensive in PP care          Asthma affecting pregnancy, antepartum    Albuterol inhaler PRN            Disposition: As patient meets milestones, will plan to discharge tomorrow with PIH precautions.  Pt desires depo-provera for contraception.  Rx today    RONNA Neff CNM  Obstetrics  [facility]

## 2018-03-13 NOTE — NURSING
"Discussed feeding choice with mother.  Reviewed benefits of breastfeeding.  Patient given "What to Expect in the First 48 Hours" handout. Mother states her intention is formula feeding. Mother initially wanted to attempt breastfeeding, but as it did not work with last baby due to inverted nipples. She wishes to go ahead with formula feeding.         Formula Feeding Handout given and reviewed.  Patient verbalized understanding.    "

## 2018-03-13 NOTE — PLAN OF CARE
Problem: Patient Care Overview  Goal: Plan of Care Review  Outcome: Ongoing (interventions implemented as appropriate)  Pt progressing well.  Bleeding light, no clots expressed.  Pain controlled with oral medications.  GBS+ treated x2.  VSS. NAD

## 2018-03-13 NOTE — ANESTHESIA POSTPROCEDURE EVALUATION
"Anesthesia Post Evaluation    Patient: Pranay Piña    Procedure(s) Performed: * No procedures listed *    Final Anesthesia Type: epidural  Patient location during evaluation: labor & delivery  Patient participation: Yes- Able to Participate  Level of consciousness: awake and alert  Post-procedure vital signs: reviewed and stable  Pain management: adequate  PONV status at discharge: No PONV  Anesthetic complications: no      Cardiovascular status: blood pressure returned to baseline  Respiratory status: unassisted, spontaneous ventilation and room air  Hydration status: euvolemic  Follow-up not needed.        Visit Vitals  BP (!) 147/75   Pulse 69   Temp 36.8 °C (98.2 °F) (Oral)   Resp 18   Ht 5' 9" (1.753 m)   Wt 125.7 kg (277 lb 1.9 oz)   LMP 06/06/2017 (Approximate)   SpO2 97%   Breastfeeding? No   BMI 40.92 kg/m²       Pain/David Score: Pain Rating Prior to Med Admin: 2 (3/12/2018  7:42 PM)  David Score: 10 (3/12/2018  7:30 PM)      "

## 2018-03-13 NOTE — ANESTHESIA RELEASE NOTE
"Anesthesia Release from PACU Note    Patient: Pranay Piña    Procedure(s) Performed: * No procedures listed *    Anesthesia type: epidural    Post pain: Adequate analgesia    Post assessment: no apparent anesthetic complications and tolerated procedure well    Last Vitals:   Visit Vitals  BP (!) 147/75   Pulse 69   Temp 36.8 °C (98.2 °F) (Oral)   Resp 18   Ht 5' 9" (1.753 m)   Wt 125.7 kg (277 lb 1.9 oz)   LMP 06/06/2017 (Approximate)   SpO2 97%   Breastfeeding? No   BMI 40.92 kg/m²       Post vital signs: stable    Level of consciousness: awake, alert  and oriented    Nausea/Vomiting: no nausea/no vomiting    Complications: none    Airway Patency: patent    Respiratory: unassisted, spontaneous ventilation, room air    Cardiovascular: stable and blood pressure at baseline    Hydration: euvolemic  "

## 2018-03-14 VITALS
TEMPERATURE: 99 F | DIASTOLIC BLOOD PRESSURE: 83 MMHG | RESPIRATION RATE: 18 BRPM | HEIGHT: 69 IN | WEIGHT: 277.13 LBS | SYSTOLIC BLOOD PRESSURE: 129 MMHG | BODY MASS INDEX: 41.04 KG/M2 | OXYGEN SATURATION: 97 % | HEART RATE: 58 BPM

## 2018-03-14 PROBLEM — Z22.330 GBS CARRIER: Status: RESOLVED | Noted: 2018-02-20 | Resolved: 2018-03-14

## 2018-03-14 PROBLEM — O26.849 SIGNIFICANT DISCREPANCY BETWEEN UTERINE SIZE AND CLINICAL DATES, ANTEPARTUM: Status: RESOLVED | Noted: 2018-02-14 | Resolved: 2018-03-14

## 2018-03-14 PROCEDURE — 99238 HOSP IP/OBS DSCHRG MGMT 30/<: CPT | Mod: ,,, | Performed by: ADVANCED PRACTICE MIDWIFE

## 2018-03-14 PROCEDURE — 25000003 PHARM REV CODE 250: Performed by: ADVANCED PRACTICE MIDWIFE

## 2018-03-14 RX ADMIN — IBUPROFEN 600 MG: 600 TABLET, FILM COATED ORAL at 11:03

## 2018-03-14 RX ADMIN — IBUPROFEN 600 MG: 600 TABLET, FILM COATED ORAL at 05:03

## 2018-03-14 NOTE — PLAN OF CARE
Problem: Patient Care Overview  Goal: Plan of Care Review  Outcome: Ongoing (interventions implemented as appropriate)  Pt progressing well. Pain well controlled. Voiding spontaneously. Bottle feeding infant. Will continue to monitor.

## 2018-03-14 NOTE — NURSING
Discharge instructions given.  Verbalized understanding.  Reviewed scheduled follow-up appointments with patient.  Mom will be discharged from mother/baby, but will remain rooming in with  until her discharge this evening.

## 2018-03-14 NOTE — DISCHARGE SUMMARY
[facility] -   Obstetrics  Discharge Summary      Patient Name: Pranay Piña  MRN: [id number]  Admission Date: 3/12/2018  Hospital Length of Stay: 2 days  Discharge Date and Time:  03/14/2018 8:28 AM  Attending Physician: Nasreen Ragland MD   Discharging Provider: Yamileth Smith CNM  Primary Care Provider: Primary Doctor No    HPI: Sent from office with increased Bp      * No surgery found *     Hospital Course:   Admit to  for IOL d/t increased BP  PIH labs  GBS prophylaxis  Will begin Pitocin 2 hours after first dose of PCN  3/13/18 pp day 1, routine pp care  3/14/18 DC to home with 1 week fu bp check,         Final Active Diagnoses:    Diagnosis Date Noted POA    PRINCIPAL PROBLEM:  NVD (normal vaginal delivery) [O80] 02/10/2015 Not Applicable    High blood pressure affecting pregnancy in third trimester, antepartum [O16.3] 03/12/2018 Yes    Asthma affecting pregnancy, antepartum [O99.519, J45.909] 03/12/2018 Yes    Single liveborn [Z38.2] 03/12/2018 No      Problems Resolved During this Admission:    Diagnosis Date Noted Date Resolved POA    GBS carrier [Z22.330] 02/20/2018 03/14/2018 Not Applicable            Feeding Method: breast    Immunizations     Date Immunization Status Dose Route/Site Given by    03/12/18 1949 MMR Incomplete 0.5 mL Subcutaneous/Left deltoid     03/12/18 1947 Tdap Incomplete 0.5 mL Intramuscular/Left deltoid           Delivery:    Episiotomy: None   Lacerations: None   Repair suture: None   Repair # of packets:     Blood loss (ml): 100     Birth information:  YOB: 2020   Time of birth: 6:32 PM   Sex: female   Delivery type: Vaginal, Spontaneous Delivery   Gestational Age: 39w6d    Delivery Clinician:      Other providers:       Additional  information:  Forceps:    Vacuum:    Breech:    Observed anomalies      Living?:           APGARS  One minute Five minutes Ten minutes   Skin color:         Heart rate:         Grimace:         Muscle  tone:         Breathing:         Totals: 9  9        Placenta: Delivered:       appearance    Pending Diagnostic Studies:     None          Discharged Condition: good    Disposition: Home or Self Care    Follow Up:  Follow-up Information     Nayla Duncan CNM In 1 week.    Specialty:  Obstetrics  Why:  1 WEEK BP NURSE CHECK, 4 WEEK NAYLA DUNCAN  Contact information:  [id number] [id number] [phone]                 Patient Instructions:     Notify your health care provider if you experience any of the following:  temperature >100.4     Notify your health care provider if you experience any of the following:  severe uncontrolled pain       Medications:  Current Discharge Medication List      CONTINUE these medications which have NOT CHANGED    Details   albuterol 90 mcg/actuation inhaler Inhale 1-2 puffs into the lungs every 6 (six) hours as needed for Wheezing.  Qty: 1 Inhaler, Refills: 0      PRENATAL 105-IRON-FOLIC AC-DHA ORAL Take by mouth.             Yamileth Smith CNM  Obstetrics  [facility]

## 2018-03-14 NOTE — DISCHARGE INSTRUCTIONS
Mother Self Care:    Activity: Avoid strenuous exercise and get adequate rest.  No driving until your physician gives you consent.  Emotional Changes: The grieving process has many different stages, be prepared to experience lots of emotional ups and downs. Identify people to be your support system, and do not hesitate to call our  if you need someone to talk to.   Breast Care: You may notice milk leaking from your breasts. Wear a support bra 24 hours a day for one week or wrap breasts in an ace bandage if needed to stop milk production.  Avoid stimulation to breasts.  You may use ice packs for discomfort.  Fartun-Care/Vaginal Bleeding: Remember to use your fartun-bottle after urinating.  Your flow will change from red, to pink, to yellow/white color over a period of 2 weeks.  Menstruation will return in 3-8 weeks.  Episiotomy Vaginal Delivery: Stitches will dissolve within 10 days to 3 weeks.  Warm baths, tucks, and dermoplast spray will promote healing.  Avoid bubble baths or strong soaps.   Section/Tubal Ligation: Keep incision clean and dry.  Please remove steri-strips in 5-7 days.  You may shower, but avoid baths.  Sexual Activity/Pelvic Rest: No sexual activity, tampons, or douching until your physician gives you consent.  Diet: Continue to eat from the five basic food groups, including plenty of protein, fruits, vegetables, and whole grains.  Limit empty calories and high fat foods.  Drink enough fluids to satisfy thirst.  Constipation/Hemorrhoids: Drink plenty of water.  You may take a stool softener or natural laxative (Metamucil). You may use tucks or hemorrhoid ointment and soak in a warm tub.    CALL YOUR OB DOCTOR IF ANY OF THE FOLLOWING OCCURS:  *Heavy bleeding - saturating a pad an hour or passing any large (2-3 inches in size) blood clots.  *Any pain, redness, or tenderness in lower leg.  *You cannot care for yourself  *Any signs of infection-      - Temperature greater than 100.5  degrees F      - Foul smelling vaginal discharge and/or incisional drainage      - Increased episiotomy or incisional pain      - Hot, hard, red or sore area on breast      - Flu-like symptoms      - Any urgency, frequency or burning with urination

## 2018-03-21 ENCOUNTER — CLINICAL SUPPORT (OUTPATIENT)
Dept: OBSTETRICS AND GYNECOLOGY | Facility: CLINIC | Age: 33
End: 2018-03-21
Payer: MEDICAID

## 2018-03-21 VITALS — SYSTOLIC BLOOD PRESSURE: 132 MMHG | DIASTOLIC BLOOD PRESSURE: 78 MMHG

## 2018-03-21 PROCEDURE — 99999 PR PBB SHADOW E&M-EST. PATIENT-LVL II: CPT | Mod: PBBFAC,,,

## 2018-03-21 PROCEDURE — 99212 OFFICE O/P EST SF 10 MIN: CPT | Mod: PBBFAC

## 2018-03-21 NOTE — PROGRESS NOTES
Pt here for BP check, s/p  on 3/12/18.  BP: 154/98.  Per Dr. Leslie, pt was advised to remain lying down on left side x 5+minutes before BP recheck.  BP recheck: 132/78.  Per Dr. Leslie, pt was advised to avoid salt, increase fluids, call for headache (not relieved by OTC meds) w/spots before eyes, ringing in ears, blurred vision, keep appt for 4 wk PP exam.  Pt voiced understanding.  MARIAH WOODWARD

## 2018-04-19 ENCOUNTER — TELEPHONE (OUTPATIENT)
Dept: OBSTETRICS AND GYNECOLOGY | Facility: CLINIC | Age: 33
End: 2018-04-19

## 2018-04-19 NOTE — TELEPHONE ENCOUNTER
----- Message from Temitope Rae sent at 4/19/2018  9:49 AM CDT -----  Contact: Pt  Please give pt a call at [phone] to have her appt rescheduled

## 2018-04-25 ENCOUNTER — POSTPARTUM VISIT (OUTPATIENT)
Dept: OBSTETRICS AND GYNECOLOGY | Facility: CLINIC | Age: 33
End: 2018-04-25
Payer: MEDICAID

## 2018-04-25 VITALS
BODY MASS INDEX: 37.03 KG/M2 | DIASTOLIC BLOOD PRESSURE: 100 MMHG | WEIGHT: 250 LBS | HEIGHT: 69 IN | SYSTOLIC BLOOD PRESSURE: 140 MMHG

## 2018-04-25 DIAGNOSIS — R03.0 ELEVATED BLOOD PRESSURE READING: ICD-10-CM

## 2018-04-25 PROBLEM — O99.519 ASTHMA AFFECTING PREGNANCY, ANTEPARTUM: Status: RESOLVED | Noted: 2018-03-12 | Resolved: 2018-04-25

## 2018-04-25 PROBLEM — O09.40 GRAND MULTIPARITY, WITH CURRENT PREGNANCY, ANTEPARTUM: Status: RESOLVED | Noted: 2017-08-14 | Resolved: 2018-04-25

## 2018-04-25 PROBLEM — O16.3 HIGH BLOOD PRESSURE AFFECTING PREGNANCY IN THIRD TRIMESTER, ANTEPARTUM: Status: RESOLVED | Noted: 2018-03-12 | Resolved: 2018-04-25

## 2018-04-25 PROBLEM — J45.909 ASTHMA AFFECTING PREGNANCY, ANTEPARTUM: Status: RESOLVED | Noted: 2018-03-12 | Resolved: 2018-04-25

## 2018-04-25 PROCEDURE — 99212 OFFICE O/P EST SF 10 MIN: CPT | Mod: PBBFAC | Performed by: ADVANCED PRACTICE MIDWIFE

## 2018-04-25 PROCEDURE — 99999 PR PBB SHADOW E&M-EST. PATIENT-LVL II: CPT | Mod: PBBFAC,,, | Performed by: ADVANCED PRACTICE MIDWIFE

## 2018-04-25 RX ORDER — NIFEDIPINE 30 MG/1
30 TABLET, EXTENDED RELEASE ORAL DAILY
Qty: 30 TABLET | Refills: 0 | Status: SHIPPED | OUTPATIENT
Start: 2018-04-25 | End: 2019-08-09

## 2018-04-25 NOTE — PROGRESS NOTES
"Subjective:       Patient ID: Pranay Piña is a 33 y.o. female.    Chief Complaint: Postpartum Care    HPI     Review of Systems   Neurological: Positive for headaches.        Pt states started this am and that she hasn't taken anything.  Advised tylenol OC        Objective:      Physical Exam   Constitutional: She is oriented to person, place, and time. She appears well-developed and well-nourished.   Pulmonary/Chest: Effort normal.   Abdominal: Soft.   Genitourinary: Vagina normal and uterus normal.   Genitourinary Comments: Uterus well involuted and non-tender.  Scant bleeding noted.  Advised normal and S/E of depo.   Musculoskeletal: Normal range of motion.   Neurological: She is alert and oriented to person, place, and time.   Skin: Skin is warm and dry.   Psychiatric: She has a normal mood and affect. Her behavior is normal.   States infant is sleeping well and that other children adjusting well. Denies any S/S of depression.       Assessment:       1. Routine postpartum follow-up    2. Elevated blood pressure reading        Plan:        Consult with Dr Nasreen Ragland will initiate Procardia XL 30 1 tablet QD.  Patient instructed to take 1 tablet daily. S/S to report to ER for discussed, "unresolved HA, Blurred vision, dizziness, feeling faint".  Pt advised to see PCP ASAP.  Attempted to call her PCP DR Nakul Leyva but office wasn't open yet.  Pt states she will schedule appointment today.  Pt is to RTC 5/30/18 for her second depo injection.  She is aware of this appointment.   "

## 2018-05-30 ENCOUNTER — CLINICAL SUPPORT (OUTPATIENT)
Dept: OBSTETRICS AND GYNECOLOGY | Facility: CLINIC | Age: 33
End: 2018-05-30
Payer: MEDICAID

## 2018-05-30 PROCEDURE — 96372 THER/PROPH/DIAG INJ SC/IM: CPT | Mod: PBBFAC

## 2018-05-30 RX ORDER — MEDROXYPROGESTERONE ACETATE 150 MG/ML
150 INJECTION, SUSPENSION INTRAMUSCULAR
Status: COMPLETED | OUTPATIENT
Start: 2018-05-30 | End: 2018-11-23

## 2018-05-30 RX ADMIN — MEDROXYPROGESTERONE ACETATE 150 MG: 150 INJECTION, SUSPENSION INTRAMUSCULAR at 08:05

## 2018-05-30 NOTE — PROGRESS NOTES
Two pt identifiers verified  pt notified to wait in clinic for 15 minutes after receiving injection, pt verbalized understanding  150mg/mL of Depo Provera administered IM to pt's left ventrogluteal.   Pt tolerated well  Next injection scheduled for 8/22/18 at 0900

## 2018-08-22 ENCOUNTER — CLINICAL SUPPORT (OUTPATIENT)
Dept: OBSTETRICS AND GYNECOLOGY | Facility: CLINIC | Age: 33
End: 2018-08-22
Payer: MEDICAID

## 2018-08-22 PROCEDURE — 99211 OFF/OP EST MAY X REQ PHY/QHP: CPT | Mod: PBBFAC

## 2018-08-22 PROCEDURE — 99999 PR PBB SHADOW E&M-EST. PATIENT-LVL I: CPT | Mod: PBBFAC,,,

## 2018-08-22 PROCEDURE — 96372 THER/PROPH/DIAG INJ SC/IM: CPT | Mod: PBBFAC

## 2018-08-22 RX ADMIN — MEDROXYPROGESTERONE ACETATE 150 MG: 150 INJECTION, SUSPENSION INTRAMUSCULAR at 09:08

## 2018-08-22 NOTE — PROGRESS NOTES
Pt. Received depo provera injection today. Pt. Tolerated well. Instructed pt. To wait in clinic for 15 minutes. Pt. Voiced understanding. Made next appt. And gave copy of AVS.

## 2018-11-23 ENCOUNTER — CLINICAL SUPPORT (OUTPATIENT)
Dept: OBSTETRICS AND GYNECOLOGY | Facility: CLINIC | Age: 33
End: 2018-11-23
Payer: MEDICAID

## 2018-11-23 PROCEDURE — 99999 PR PBB SHADOW E&M-EST. PATIENT-LVL I: CPT | Mod: PBBFAC,,,

## 2018-11-23 PROCEDURE — 99211 OFF/OP EST MAY X REQ PHY/QHP: CPT | Mod: PBBFAC

## 2018-11-23 PROCEDURE — 96372 THER/PROPH/DIAG INJ SC/IM: CPT | Mod: PBBFAC

## 2018-11-23 RX ADMIN — MEDROXYPROGESTERONE ACETATE 150 MG: 150 INJECTION, SUSPENSION INTRAMUSCULAR at 09:11

## 2018-11-23 NOTE — PROGRESS NOTES
Two pt identifiers verified  Pt is two days late for her injection, okay to give per Airam Dillon CNM  pt notified to wait in clinic for 15 minutes after receiving injection, pt verbalized understanding  150mg/mL of Depo Provera administered IM to pt's left ventrogluteal.   Pt tolerated well  Next injection scheduled

## 2019-08-09 ENCOUNTER — OFFICE VISIT (OUTPATIENT)
Dept: OBSTETRICS AND GYNECOLOGY | Facility: CLINIC | Age: 34
End: 2019-08-09
Payer: MEDICAID

## 2019-08-09 VITALS
BODY MASS INDEX: 41.83 KG/M2 | SYSTOLIC BLOOD PRESSURE: 122 MMHG | DIASTOLIC BLOOD PRESSURE: 72 MMHG | HEIGHT: 69 IN | WEIGHT: 282.44 LBS

## 2019-08-09 DIAGNOSIS — Z01.419 SMEAR, VAGINAL, AS PART OF ROUTINE GYNECOLOGICAL EXAMINATION: ICD-10-CM

## 2019-08-09 DIAGNOSIS — Z00.00 PREVENTATIVE HEALTH CARE: Primary | ICD-10-CM

## 2019-08-09 DIAGNOSIS — Z12.72 SMEAR, VAGINAL, AS PART OF ROUTINE GYNECOLOGICAL EXAMINATION: ICD-10-CM

## 2019-08-09 PROCEDURE — 87624 HPV HI-RISK TYP POOLED RSLT: CPT

## 2019-08-09 PROCEDURE — 99212 OFFICE O/P EST SF 10 MIN: CPT | Mod: PBBFAC | Performed by: NURSE PRACTITIONER

## 2019-08-09 PROCEDURE — 87481 CANDIDA DNA AMP PROBE: CPT | Mod: 59

## 2019-08-09 PROCEDURE — 87491 CHLMYD TRACH DNA AMP PROBE: CPT

## 2019-08-09 PROCEDURE — 99999 PR PBB SHADOW E&M-EST. PATIENT-LVL II: CPT | Mod: PBBFAC,,, | Performed by: NURSE PRACTITIONER

## 2019-08-09 PROCEDURE — 99395 PREV VISIT EST AGE 18-39: CPT | Mod: S$PBB,,, | Performed by: NURSE PRACTITIONER

## 2019-08-09 PROCEDURE — 99395 PR PREVENTIVE VISIT,EST,18-39: ICD-10-PCS | Mod: S$PBB,,, | Performed by: NURSE PRACTITIONER

## 2019-08-09 PROCEDURE — 99999 PR PBB SHADOW E&M-EST. PATIENT-LVL II: ICD-10-PCS | Mod: PBBFAC,,, | Performed by: NURSE PRACTITIONER

## 2019-08-09 PROCEDURE — 88175 CYTOPATH C/V AUTO FLUID REDO: CPT

## 2019-08-09 PROCEDURE — 87801 DETECT AGNT MULT DNA AMPLI: CPT

## 2019-08-09 NOTE — PROGRESS NOTES
CC: Well woman exam    Pranay Piña is a 34 y.o. female  presents for well woman exam.  LMP: Patient's last menstrual period was 2019..  No issues, problems, or complaints. Cycles are every 26-28 days and not heavy. Is sexually active, no birth control. Last pap exam indicated ASCUS with positive HPV. S/P Colpo.    Past Medical History:   Diagnosis Date    Abnormal Pap smear 2005    colposcopy    Abnormal Pap smear of cervix     Asthma affecting pregnancy, antepartum 3/12/2018     Past Surgical History:   Procedure Laterality Date    Right Knee       Social History     Socioeconomic History    Marital status: Single     Spouse name: Not on file    Number of children: Not on file    Years of education: Not on file    Highest education level: Not on file   Occupational History    Not on file   Social Needs    Financial resource strain: Not on file    Food insecurity:     Worry: Not on file     Inability: Not on file    Transportation needs:     Medical: Not on file     Non-medical: Not on file   Tobacco Use    Smoking status: Never Smoker    Smokeless tobacco: Never Used   Substance and Sexual Activity    Alcohol use: No    Drug use: No    Sexual activity: Yes     Partners: Male     Birth control/protection: None   Lifestyle    Physical activity:     Days per week: Not on file     Minutes per session: Not on file    Stress: Not on file   Relationships    Social connections:     Talks on phone: Not on file     Gets together: Not on file     Attends Jewish service: Not on file     Active member of club or organization: Not on file     Attends meetings of clubs or organizations: Not on file     Relationship status: Not on file   Other Topics Concern    Not on file   Social History Narrative    Not on file     Family History   Problem Relation Age of Onset    Hypertension Maternal Grandfather     Hypertension Mother     Hypertension Maternal Uncle     Hypertension  "Maternal Uncle     Breast cancer Neg Hx     Colon cancer Neg Hx     Diabetes Neg Hx     Ovarian cancer Neg Hx     Stroke Neg Hx      OB History        6    Para   4    Term   4       0    AB   2    Living   4       SAB   2    TAB   0    Ectopic   0    Multiple   0    Live Births   4                 /72   Ht 5' 9" (1.753 m)   Wt 128.1 kg (282 lb 6.6 oz)   LMP 2019   BMI 41.70 kg/m²       ROS:  GENERAL: Denies weight gain or weight loss. Feeling well overall.   SKIN: Denies rash or lesions.   HEAD: Denies head injury or headache.   NODES: Denies enlarged lymph nodes.   CHEST: Denies chest pain or shortness of breath.   CARDIOVASCULAR: Denies palpitations or left sided chest pain.   ABDOMEN: No abdominal pain, constipation, diarrhea, nausea, vomiting or rectal bleeding.   URINARY: No frequency, dysuria, hematuria, or burning on urination.  REPRODUCTIVE: See HPI.   BREASTS: The patient performs breast self-examination and denies pain, lumps, or nipple discharge.   HEMATOLOGIC: No easy bruisability or excessive bleeding.   MUSCULOSKELETAL: Denies joint pain or swelling.   NEUROLOGIC: Denies syncope or weakness.   PSYCHIATRIC: Denies depression, anxiety or mood swings.    PHYSICAL EXAM:  APPEARANCE: Obese female, in no acute distress.  AFFECT: WNL, alert and oriented x 3  SKIN: No acne or hirsutism  NECK: Neck symmetric without masses or thyromegaly  NODES: No inguinal, cervical, axillary, or femoral lymph node enlargement  CHEST: Good respiratory effect  ABDOMEN: Soft.  No tenderness or masses.  No hepatosplenomegaly.  No hernias.  BREASTS: Symmetrical, no skin changes or visible lesions.  No palpable masses, nipple discharge bilaterally.  PELVIC: Normal external genitalia without lesions.  Normal hair distribution.  Adequate perineal body, normal urethral meatus.  Vagina moist and well rugated without lesions or discharge.  Cervix pink, without lesions, discharge or tenderness.  No " significant cystocele or rectocele.  Bimanual exam shows uterus to be normal size, regular, mobile and nontender.  Adnexa without masses or tenderness.    EXTREMITIES: No edema.    1. Preventative health care  C. trachomatis/N. gonorrhoeae by AMP DNA    Vaginosis Screen by DNA Probe    Liquid-based pap smear, screening    HPV High Risk Genotypes, PCR   2. Smear, vaginal, as part of routine gynecological examination  C. trachomatis/N. gonorrhoeae by AMP DNA    Vaginosis Screen by DNA Probe    Liquid-based pap smear, screening    HPV High Risk Genotypes, PCR    PLAN:  GC and Affirm cx  Pap exam  Patient was counseled today on A.C.S. Pap guidelines and recommendations for yearly pelvic exams, mammograms and monthly self breast exams; to see her PCP for other health maintenance.

## 2019-08-10 LAB
C TRACH DNA SPEC QL NAA+PROBE: NOT DETECTED
N GONORRHOEA DNA SPEC QL NAA+PROBE: NOT DETECTED

## 2019-08-12 LAB
BACTERIAL VAGINOSIS DNA: POSITIVE
CANDIDA GLABRATA DNA: NEGATIVE
CANDIDA KRUSEI DNA: NEGATIVE
CANDIDA RRNA VAG QL PROBE: NEGATIVE
T VAGINALIS RRNA GENITAL QL PROBE: POSITIVE

## 2019-08-12 RX ORDER — METRONIDAZOLE 500 MG/1
500 TABLET ORAL 2 TIMES DAILY
Qty: 14 TABLET | Refills: 0 | Status: SHIPPED | OUTPATIENT
Start: 2019-08-12 | End: 2019-08-19

## 2019-08-15 LAB
HPV HR 12 DNA CVX QL NAA+PROBE: NEGATIVE
HPV16 AG SPEC QL: NEGATIVE
HPV18 DNA SPEC QL NAA+PROBE: NEGATIVE

## 2019-10-19 ENCOUNTER — OFFICE VISIT (OUTPATIENT)
Dept: OBSTETRICS AND GYNECOLOGY | Facility: CLINIC | Age: 34
End: 2019-10-19
Payer: MEDICAID

## 2019-10-19 VITALS
BODY MASS INDEX: 40.79 KG/M2 | DIASTOLIC BLOOD PRESSURE: 96 MMHG | HEIGHT: 69 IN | WEIGHT: 275.38 LBS | SYSTOLIC BLOOD PRESSURE: 142 MMHG

## 2019-10-19 DIAGNOSIS — A59.01 TRICHOMONAS VAGINALIS (TV) INFECTION: Primary | ICD-10-CM

## 2019-10-19 PROCEDURE — 99213 OFFICE O/P EST LOW 20 MIN: CPT | Mod: PBBFAC | Performed by: NURSE PRACTITIONER

## 2019-10-19 PROCEDURE — 99999 PR PBB SHADOW E&M-EST. PATIENT-LVL III: CPT | Mod: PBBFAC,,, | Performed by: NURSE PRACTITIONER

## 2019-10-19 PROCEDURE — 87481 CANDIDA DNA AMP PROBE: CPT | Mod: 59

## 2019-10-19 PROCEDURE — 99213 OFFICE O/P EST LOW 20 MIN: CPT | Mod: S$PBB,,, | Performed by: NURSE PRACTITIONER

## 2019-10-19 PROCEDURE — 99213 PR OFFICE/OUTPT VISIT, EST, LEVL III, 20-29 MIN: ICD-10-PCS | Mod: S$PBB,,, | Performed by: NURSE PRACTITIONER

## 2019-10-19 PROCEDURE — 99999 PR PBB SHADOW E&M-EST. PATIENT-LVL III: ICD-10-PCS | Mod: PBBFAC,,, | Performed by: NURSE PRACTITIONER

## 2019-10-19 NOTE — PROGRESS NOTES
CC: KASSIDY for Trich    Pranay Piña is a 34 y.o. female  presents for KASSIDY for positive Trich. No pelvic pain or discharge. Completed treatment.     Past Medical History:   Diagnosis Date    Abnormal Pap smear 2005    colposcopy    Abnormal Pap smear of cervix     Asthma affecting pregnancy, antepartum 3/12/2018     Past Surgical History:   Procedure Laterality Date    Right Knee       Social History     Socioeconomic History    Marital status: Single     Spouse name: Not on file    Number of children: Not on file    Years of education: Not on file    Highest education level: Not on file   Occupational History    Not on file   Social Needs    Financial resource strain: Not on file    Food insecurity:     Worry: Not on file     Inability: Not on file    Transportation needs:     Medical: Not on file     Non-medical: Not on file   Tobacco Use    Smoking status: Never Smoker    Smokeless tobacco: Never Used   Substance and Sexual Activity    Alcohol use: No    Drug use: No    Sexual activity: Yes     Partners: Male     Birth control/protection: None   Lifestyle    Physical activity:     Days per week: Not on file     Minutes per session: Not on file    Stress: Not on file   Relationships    Social connections:     Talks on phone: Not on file     Gets together: Not on file     Attends Orthodoxy service: Not on file     Active member of club or organization: Not on file     Attends meetings of clubs or organizations: Not on file     Relationship status: Not on file   Other Topics Concern    Not on file   Social History Narrative    Not on file     Family History   Problem Relation Age of Onset    Hypertension Maternal Grandfather     Hypertension Mother     Hypertension Maternal Uncle     Hypertension Maternal Uncle     Breast cancer Neg Hx     Colon cancer Neg Hx     Diabetes Neg Hx     Ovarian cancer Neg Hx     Stroke Neg Hx      OB History        6    Para   4  "   Term   4       0    AB   2    Living   4       SAB   2    TAB   0    Ectopic   0    Multiple   0    Live Births   4                 BP (!) 142/96 (BP Location: Left arm, Patient Position: Sitting, BP Method: Medium (Manual))   Ht 5' 9" (1.753 m)   Wt 124.9 kg (275 lb 5.7 oz)   LMP 2019   BMI 40.66 kg/m²       ROS:  GENERAL: Denies weight gain or weight loss. Feeling well overall.   ABDOMEN: No abdominal pain, constipation, diarrhea, nausea, vomiting or rectal bleeding.   URINARY: No frequency, dysuria, hematuria, or burning on urination.  REPRODUCTIVE: See HPI.       PHYSICAL EXAM:  APPEARANCE: Well nourished, well developed, in no acute distress.  AFFECT: WNL, alert and oriented x 3  ABDOMEN: Soft.  No tenderness or masses.  No hepatosplenomegaly.  No hernias.  PELVIC: Normal external genitalia without lesions. Vagina no discharge    1. Trichomonas vaginalis (TV) infection  Vaginosis Screen by DNA Probe     PLAN:  Repeat Affirm cx              "

## 2019-10-20 LAB
BACTERIAL VAGINOSIS DNA: POSITIVE
CANDIDA GLABRATA DNA: NEGATIVE
CANDIDA KRUSEI DNA: NEGATIVE
CANDIDA RRNA VAG QL PROBE: NEGATIVE
T VAGINALIS RRNA GENITAL QL PROBE: NEGATIVE

## 2019-10-20 RX ORDER — METRONIDAZOLE 500 MG/1
500 TABLET ORAL 2 TIMES DAILY
Qty: 14 TABLET | Refills: 0 | Status: SHIPPED | OUTPATIENT
Start: 2019-10-20 | End: 2019-10-27

## 2019-12-16 ENCOUNTER — LAB VISIT (OUTPATIENT)
Dept: LAB | Facility: HOSPITAL | Age: 34
End: 2019-12-16
Attending: NURSE PRACTITIONER
Payer: MEDICAID

## 2019-12-16 ENCOUNTER — OFFICE VISIT (OUTPATIENT)
Dept: OBSTETRICS AND GYNECOLOGY | Facility: CLINIC | Age: 34
End: 2019-12-16
Payer: MEDICAID

## 2019-12-16 VITALS
BODY MASS INDEX: 41.47 KG/M2 | DIASTOLIC BLOOD PRESSURE: 74 MMHG | HEIGHT: 69 IN | SYSTOLIC BLOOD PRESSURE: 122 MMHG | WEIGHT: 280 LBS

## 2019-12-16 DIAGNOSIS — Z71.1 CONCERN ABOUT STD IN FEMALE WITHOUT DIAGNOSIS: Primary | ICD-10-CM

## 2019-12-16 DIAGNOSIS — Z71.1 CONCERN ABOUT STD IN FEMALE WITHOUT DIAGNOSIS: ICD-10-CM

## 2019-12-16 PROCEDURE — 99213 PR OFFICE/OUTPT VISIT, EST, LEVL III, 20-29 MIN: ICD-10-PCS | Mod: S$PBB,,, | Performed by: NURSE PRACTITIONER

## 2019-12-16 PROCEDURE — 36415 COLL VENOUS BLD VENIPUNCTURE: CPT

## 2019-12-16 PROCEDURE — 99999 PR PBB SHADOW E&M-EST. PATIENT-LVL III: ICD-10-PCS | Mod: PBBFAC,,, | Performed by: NURSE PRACTITIONER

## 2019-12-16 PROCEDURE — 86696 HERPES SIMPLEX TYPE 2 TEST: CPT

## 2019-12-16 PROCEDURE — 87481 CANDIDA DNA AMP PROBE: CPT | Mod: 59

## 2019-12-16 PROCEDURE — 99999 PR PBB SHADOW E&M-EST. PATIENT-LVL III: CPT | Mod: PBBFAC,,, | Performed by: NURSE PRACTITIONER

## 2019-12-16 PROCEDURE — 86592 SYPHILIS TEST NON-TREP QUAL: CPT

## 2019-12-16 PROCEDURE — 99213 OFFICE O/P EST LOW 20 MIN: CPT | Mod: PBBFAC | Performed by: NURSE PRACTITIONER

## 2019-12-16 PROCEDURE — 80074 ACUTE HEPATITIS PANEL: CPT

## 2019-12-16 PROCEDURE — 99213 OFFICE O/P EST LOW 20 MIN: CPT | Mod: S$PBB,,, | Performed by: NURSE PRACTITIONER

## 2019-12-16 PROCEDURE — 87801 DETECT AGNT MULT DNA AMPLI: CPT

## 2019-12-16 PROCEDURE — 87491 CHLMYD TRACH DNA AMP PROBE: CPT | Mod: 59

## 2019-12-16 PROCEDURE — 86703 HIV-1/HIV-2 1 RESULT ANTBDY: CPT

## 2019-12-16 RX ORDER — FLUCONAZOLE 150 MG/1
150 TABLET ORAL DAILY
Qty: 2 TABLET | Refills: 0 | Status: SHIPPED | OUTPATIENT
Start: 2019-12-16 | End: 2019-12-18

## 2019-12-16 RX ORDER — CLINDAMYCIN HYDROCHLORIDE 300 MG/1
300 CAPSULE ORAL EVERY 8 HOURS
Qty: 21 CAPSULE | Refills: 0 | Status: SHIPPED | OUTPATIENT
Start: 2019-12-16 | End: 2019-12-23

## 2019-12-16 RX ORDER — METRONIDAZOLE 500 MG/1
500 TABLET ORAL 2 TIMES DAILY
Qty: 14 TABLET | Refills: 0 | Status: SHIPPED | OUTPATIENT
Start: 2019-12-16 | End: 2019-12-16

## 2019-12-16 NOTE — PROGRESS NOTES
CC: STD assessment    Pranay Piña is a 34 y.o. female  presents for STD assessment.  LMP: Patient's last menstrual period was 2019..  No pelvic pain. Is sexually active, h/o Trich.     Past Medical History:   Diagnosis Date    Abnormal Pap smear 2005    colposcopy    Abnormal Pap smear of cervix     Asthma affecting pregnancy, antepartum 3/12/2018     Past Surgical History:   Procedure Laterality Date    Right Knee       Social History     Socioeconomic History    Marital status: Single     Spouse name: Not on file    Number of children: Not on file    Years of education: Not on file    Highest education level: Not on file   Occupational History    Not on file   Social Needs    Financial resource strain: Not on file    Food insecurity:     Worry: Not on file     Inability: Not on file    Transportation needs:     Medical: Not on file     Non-medical: Not on file   Tobacco Use    Smoking status: Never Smoker    Smokeless tobacco: Never Used   Substance and Sexual Activity    Alcohol use: No    Drug use: No    Sexual activity: Yes     Partners: Male     Birth control/protection: None   Lifestyle    Physical activity:     Days per week: Not on file     Minutes per session: Not on file    Stress: Not on file   Relationships    Social connections:     Talks on phone: Not on file     Gets together: Not on file     Attends Church service: Not on file     Active member of club or organization: Not on file     Attends meetings of clubs or organizations: Not on file     Relationship status: Not on file   Other Topics Concern    Not on file   Social History Narrative    Not on file     Family History   Problem Relation Age of Onset    Breast cancer Neg Hx     Colon cancer Neg Hx     Diabetes Neg Hx     Ovarian cancer Neg Hx     Stroke Neg Hx      OB History        6    Para   4    Term   4       0    AB   2    Living   4       SAB   2    TAB   0     "Ectopic   0    Multiple   0    Live Births   4                 /74   Ht 5' 9" (1.753 m)   Wt 127 kg (279 lb 15.8 oz)   LMP 11/29/2019   BMI 41.35 kg/m²       ROS:  ABDOMEN: No abdominal pain, constipation, diarrhea, nausea, vomiting or rectal bleeding.   URINARY: No frequency, dysuria, hematuria, or burning on urination.  REPRODUCTIVE: See HPI.   .    PHYSICAL EXAM:  APPEARANCE: Well nourished, well developed, in no acute distress.  AFFECT: WNL, alert and oriented x 3  ABDOMEN: Soft.  No tenderness or masses.    PELVIC: Normal external genitalia without lesions. Vagina thick, white discharge.    1. Concern about STD in female without diagnosis  RPR    HIV 1/2 Ag/Ab (4th Gen)    Hepatitis panel, acute    C. trachomatis/N. gonorrhoeae by AMP DNA    Vaginosis Screen by DNA Probe    Herpes Simplex Virus (HSV) Types 1 & 2, IgG, Herpes Titer    PLAN:  STD assessment  Wet prep; Few clue cells and yeast  Flagyl and Diflucan rx          "

## 2019-12-17 LAB
BACTERIAL VAGINOSIS DNA: POSITIVE
C TRACH DNA SPEC QL NAA+PROBE: NOT DETECTED
CANDIDA GLABRATA DNA: NEGATIVE
CANDIDA KRUSEI DNA: NEGATIVE
CANDIDA RRNA VAG QL PROBE: NEGATIVE
HAV IGM SERPL QL IA: NEGATIVE
HBV CORE IGM SERPL QL IA: NEGATIVE
HBV SURFACE AG SERPL QL IA: NEGATIVE
HCV AB SERPL QL IA: NEGATIVE
HIV 1+2 AB+HIV1 P24 AG SERPL QL IA: NEGATIVE
HSV1 IGG SERPL QL IA: POSITIVE
HSV2 IGG SERPL QL IA: POSITIVE
N GONORRHOEA DNA SPEC QL NAA+PROBE: NOT DETECTED
RPR SER QL: NORMAL
T VAGINALIS RRNA GENITAL QL PROBE: NEGATIVE

## 2019-12-26 ENCOUNTER — PATIENT MESSAGE (OUTPATIENT)
Dept: OBSTETRICS AND GYNECOLOGY | Facility: CLINIC | Age: 34
End: 2019-12-26

## 2020-03-04 NOTE — DISCHARGE INSTRUCTIONS
Discharge Instructions    Self Care Instructions:    Diet:  · Eat from the five basic food groups  · Fruits and proteins are good choices  · Limit fast foods and added salt/sugar  · Moderate carbonated and caffeine drinks    Hydration:  · Drink at least 8 large glasses of water a day    Kick Counts:  · After a meal, rest on your side and note the baby's movements until you have 8-10 movements in a 2 hour counting period.    · If you do not feel your baby move 8-10 times within 2 hours or you sense a change in the type or character of the baby's movement, you should come in to the hospital at once.  · Remember; your baby can sleep for 20-40 minutes at a time.      When to notify your provider:    · Vaginal bleeding like a period;  You may spot if we examined your cervix.  · If your water breaks, come to the birth center.  Note time, color and odor.  · Abdominal tenderness or pain that does not go away  · Contractions every 3 to 5 minutes for 1 to 2 hours.  True contractions move from front to back, are regular; usually get longer, stronger and closer together and do not stop if you change your position or activity.  · Any burning, urgency or frequency in relation to emptying your bladder.  · Any temperature greater than 100.4 degrees, chills, flu-like symptoms         Pt making crude remarks to male peers in common area about liking lap dances; redirected  Pt made a comment to the effect of, "why would they give Jacqualyn Isles to someone that likes Aishan Isles?" When asked to repeat, pt stated to never mind  Pt educated about what meds were given and that here Depakote was increased to which she replied, "maybe that will work" and laughed  Remains bizarre and pressured

## 2020-05-12 ENCOUNTER — LAB VISIT (OUTPATIENT)
Dept: LAB | Facility: HOSPITAL | Age: 35
End: 2020-05-12
Attending: MIDWIFE
Payer: MEDICAID

## 2020-05-12 ENCOUNTER — OFFICE VISIT (OUTPATIENT)
Dept: OBSTETRICS AND GYNECOLOGY | Facility: CLINIC | Age: 35
End: 2020-05-12
Payer: MEDICAID

## 2020-05-12 VITALS
HEIGHT: 69 IN | BODY MASS INDEX: 40.91 KG/M2 | DIASTOLIC BLOOD PRESSURE: 82 MMHG | WEIGHT: 276.25 LBS | SYSTOLIC BLOOD PRESSURE: 140 MMHG

## 2020-05-12 DIAGNOSIS — O03.9 MISCARRIAGE: ICD-10-CM

## 2020-05-12 DIAGNOSIS — O03.9 MISCARRIAGE: Primary | ICD-10-CM

## 2020-05-12 LAB — HCG INTACT+B SERPL-ACNC: 330 MIU/ML

## 2020-05-12 PROCEDURE — 99999 PR PBB SHADOW E&M-EST. PATIENT-LVL III: ICD-10-PCS | Mod: PBBFAC,,, | Performed by: MIDWIFE

## 2020-05-12 PROCEDURE — 99212 PR OFFICE/OUTPT VISIT, EST, LEVL II, 10-19 MIN: ICD-10-PCS | Mod: S$PBB,TH,, | Performed by: MIDWIFE

## 2020-05-12 PROCEDURE — 84702 CHORIONIC GONADOTROPIN TEST: CPT

## 2020-05-12 PROCEDURE — 99999 PR PBB SHADOW E&M-EST. PATIENT-LVL III: CPT | Mod: PBBFAC,,, | Performed by: MIDWIFE

## 2020-05-12 PROCEDURE — 99213 OFFICE O/P EST LOW 20 MIN: CPT | Mod: PBBFAC,TH | Performed by: MIDWIFE

## 2020-05-12 PROCEDURE — 36415 COLL VENOUS BLD VENIPUNCTURE: CPT

## 2020-05-12 PROCEDURE — 99212 OFFICE O/P EST SF 10 MIN: CPT | Mod: S$PBB,TH,, | Performed by: MIDWIFE

## 2020-05-13 ENCOUNTER — PATIENT MESSAGE (OUTPATIENT)
Dept: OBSTETRICS AND GYNECOLOGY | Facility: CLINIC | Age: 35
End: 2020-05-13

## 2020-05-17 NOTE — PROGRESS NOTES
"Subjective:      Pranay Piña is a 35 y.o. female. Pranay reports bleeding since 5/10/20. She is not in acute distress. Ectopic risks: none.    Pregnancy testing: quant HCG level 5/12/20 on 330.  Pregnancy imaging: not done.  Blood type: A positive.  Other lab results: none.    The following portions of the patient's history were reviewed and updated as appropriate: allergies, current medications, past family history, past medical history, past social history, past surgical history and problem list.    Review of Systems  Pertinent items are noted in HPI.     Objective:       BP (!) 140/82   Ht 5' 9" (1.753 m)   Wt 125.3 kg (276 lb 3.8 oz)   LMP 02/26/2020   BMI 40.79 kg/m²   General:   alert, appears stated age, cooperative, no distress and moderately obese           Assessment:           Spontaneous Miscarriage      Plan:      Blood type and Rh: positive.  Warning signs discussed: to call for increased bleeding, abdominal or shoulder pain, light headedness, or if she has any concerns.  Repeat quant in 1.5 weeks   "

## 2020-05-22 ENCOUNTER — LAB VISIT (OUTPATIENT)
Dept: LAB | Facility: HOSPITAL | Age: 35
End: 2020-05-22
Attending: MIDWIFE
Payer: MEDICAID

## 2020-05-22 DIAGNOSIS — O03.9 MISCARRIAGE: ICD-10-CM

## 2020-05-22 PROCEDURE — 36415 COLL VENOUS BLD VENIPUNCTURE: CPT

## 2020-05-22 PROCEDURE — 84702 CHORIONIC GONADOTROPIN TEST: CPT

## 2020-05-23 LAB — HCG INTACT+B SERPL-ACNC: 14 MIU/ML

## 2021-04-28 ENCOUNTER — PATIENT MESSAGE (OUTPATIENT)
Dept: RESEARCH | Facility: HOSPITAL | Age: 36
End: 2021-04-28

## 2021-06-17 ENCOUNTER — TELEPHONE (OUTPATIENT)
Dept: OBSTETRICS AND GYNECOLOGY | Facility: CLINIC | Age: 36
End: 2021-06-17

## 2021-06-21 ENCOUNTER — TELEPHONE (OUTPATIENT)
Dept: OBSTETRICS AND GYNECOLOGY | Facility: CLINIC | Age: 36
End: 2021-06-21

## 2021-06-28 ENCOUNTER — OFFICE VISIT (OUTPATIENT)
Dept: OBSTETRICS AND GYNECOLOGY | Facility: CLINIC | Age: 36
End: 2021-06-28
Payer: MEDICAID

## 2021-06-28 ENCOUNTER — LAB VISIT (OUTPATIENT)
Dept: LAB | Facility: HOSPITAL | Age: 36
End: 2021-06-28
Attending: NURSE PRACTITIONER
Payer: MEDICAID

## 2021-06-28 VITALS
HEIGHT: 69 IN | WEIGHT: 277.75 LBS | SYSTOLIC BLOOD PRESSURE: 134 MMHG | BODY MASS INDEX: 41.14 KG/M2 | DIASTOLIC BLOOD PRESSURE: 88 MMHG

## 2021-06-28 DIAGNOSIS — Z11.3 SCREENING FOR STD (SEXUALLY TRANSMITTED DISEASE): ICD-10-CM

## 2021-06-28 DIAGNOSIS — Z01.419 ROUTINE GYNECOLOGICAL EXAMINATION: Primary | ICD-10-CM

## 2021-06-28 PROCEDURE — 99395 PREV VISIT EST AGE 18-39: CPT | Mod: S$PBB,,, | Performed by: NURSE PRACTITIONER

## 2021-06-28 PROCEDURE — 86703 HIV-1/HIV-2 1 RESULT ANTBDY: CPT | Performed by: NURSE PRACTITIONER

## 2021-06-28 PROCEDURE — 99395 PR PREVENTIVE VISIT,EST,18-39: ICD-10-PCS | Mod: S$PBB,,, | Performed by: NURSE PRACTITIONER

## 2021-06-28 PROCEDURE — 99213 OFFICE O/P EST LOW 20 MIN: CPT | Mod: PBBFAC | Performed by: NURSE PRACTITIONER

## 2021-06-28 PROCEDURE — 87491 CHLMYD TRACH DNA AMP PROBE: CPT | Performed by: NURSE PRACTITIONER

## 2021-06-28 PROCEDURE — 86592 SYPHILIS TEST NON-TREP QUAL: CPT | Performed by: NURSE PRACTITIONER

## 2021-06-28 PROCEDURE — 99999 PR PBB SHADOW E&M-EST. PATIENT-LVL III: ICD-10-PCS | Mod: PBBFAC,,, | Performed by: NURSE PRACTITIONER

## 2021-06-28 PROCEDURE — 36415 COLL VENOUS BLD VENIPUNCTURE: CPT | Performed by: NURSE PRACTITIONER

## 2021-06-28 PROCEDURE — 99999 PR PBB SHADOW E&M-EST. PATIENT-LVL III: CPT | Mod: PBBFAC,,, | Performed by: NURSE PRACTITIONER

## 2021-06-28 PROCEDURE — 87591 N.GONORRHOEAE DNA AMP PROB: CPT | Performed by: NURSE PRACTITIONER

## 2021-06-28 RX ORDER — AMLODIPINE BESYLATE 5 MG/1
5 TABLET ORAL DAILY
COMMUNITY
Start: 2021-06-27 | End: 2021-12-29

## 2021-06-28 RX ORDER — BUPROPION HYDROCHLORIDE 150 MG/1
150 TABLET, EXTENDED RELEASE ORAL 2 TIMES DAILY
COMMUNITY
Start: 2021-06-01 | End: 2021-12-29

## 2021-06-28 RX ORDER — BUSPIRONE HYDROCHLORIDE 7.5 MG/1
7.5 TABLET ORAL 2 TIMES DAILY
COMMUNITY
Start: 2021-06-01 | End: 2021-12-29

## 2021-06-28 RX ORDER — PROPRANOLOL HYDROCHLORIDE 40 MG/1
40 TABLET ORAL DAILY
COMMUNITY
Start: 2021-06-06 | End: 2021-12-29

## 2021-06-28 RX ORDER — HYDROCODONE BITARTRATE AND ACETAMINOPHEN 5; 325 MG/1; MG/1
1 TABLET ORAL
COMMUNITY
Start: 2021-02-24 | End: 2021-12-29

## 2021-06-28 RX ORDER — BUDESONIDE AND FORMOTEROL FUMARATE DIHYDRATE 80; 4.5 UG/1; UG/1
1 AEROSOL RESPIRATORY (INHALATION) DAILY
COMMUNITY
Start: 2021-06-14

## 2021-06-28 RX ORDER — SUMATRIPTAN SUCCINATE 100 MG/1
1 TABLET ORAL
COMMUNITY
Start: 2021-06-26 | End: 2021-12-29

## 2021-06-28 RX ORDER — GABAPENTIN 300 MG/1
1 CAPSULE ORAL NIGHTLY PRN
COMMUNITY
End: 2021-12-29

## 2021-06-29 LAB
HIV 1+2 AB+HIV1 P24 AG SERPL QL IA: NEGATIVE
RPR SER QL: NORMAL

## 2021-07-01 LAB
C TRACH DNA SPEC QL NAA+PROBE: NOT DETECTED
N GONORRHOEA DNA SPEC QL NAA+PROBE: NOT DETECTED

## 2021-12-29 ENCOUNTER — OFFICE VISIT (OUTPATIENT)
Dept: OBSTETRICS AND GYNECOLOGY | Facility: CLINIC | Age: 36
End: 2021-12-29
Payer: MEDICAID

## 2021-12-29 ENCOUNTER — LAB VISIT (OUTPATIENT)
Dept: LAB | Facility: HOSPITAL | Age: 36
End: 2021-12-29
Attending: ADVANCED PRACTICE MIDWIFE
Payer: MEDICAID

## 2021-12-29 VITALS
DIASTOLIC BLOOD PRESSURE: 86 MMHG | HEIGHT: 69 IN | WEIGHT: 282.44 LBS | SYSTOLIC BLOOD PRESSURE: 134 MMHG | BODY MASS INDEX: 41.83 KG/M2

## 2021-12-29 DIAGNOSIS — Z32.01 POSITIVE PREGNANCY TEST: ICD-10-CM

## 2021-12-29 DIAGNOSIS — O09.529 ANTEPARTUM MULTIGRAVIDA OF ADVANCED MATERNAL AGE: ICD-10-CM

## 2021-12-29 DIAGNOSIS — Z34.80 SUPERVISION OF OTHER NORMAL PREGNANCY: ICD-10-CM

## 2021-12-29 DIAGNOSIS — Z34.80 SUPERVISION OF OTHER NORMAL PREGNANCY: Primary | ICD-10-CM

## 2021-12-29 DIAGNOSIS — O99.210 OBESITY AFFECTING PREGNANCY, ANTEPARTUM: ICD-10-CM

## 2021-12-29 LAB
ABO + RH BLD: NORMAL
BASOPHILS # BLD AUTO: 0.04 K/UL (ref 0–0.2)
BASOPHILS NFR BLD: 1.1 % (ref 0–1.9)
BLD GP AB SCN CELLS X3 SERPL QL: NORMAL
DIFFERENTIAL METHOD: ABNORMAL
EOSINOPHIL # BLD AUTO: 0.1 K/UL (ref 0–0.5)
EOSINOPHIL NFR BLD: 2 % (ref 0–8)
ERYTHROCYTE [DISTWIDTH] IN BLOOD BY AUTOMATED COUNT: 15 % (ref 11.5–14.5)
HCT VFR BLD AUTO: 42.9 % (ref 37–48.5)
HGB BLD-MCNC: 13.6 G/DL (ref 12–16)
IMM GRANULOCYTES # BLD AUTO: 0 K/UL (ref 0–0.04)
IMM GRANULOCYTES NFR BLD AUTO: 0 % (ref 0–0.5)
LYMPHOCYTES # BLD AUTO: 1.8 K/UL (ref 1–4.8)
LYMPHOCYTES NFR BLD: 51.1 % (ref 18–48)
MCH RBC QN AUTO: 30.8 PG (ref 27–31)
MCHC RBC AUTO-ENTMCNC: 31.7 G/DL (ref 32–36)
MCV RBC AUTO: 97 FL (ref 82–98)
MONOCYTES # BLD AUTO: 0.3 K/UL (ref 0.3–1)
MONOCYTES NFR BLD: 8.5 % (ref 4–15)
NEUTROPHILS # BLD AUTO: 1.3 K/UL (ref 1.8–7.7)
NEUTROPHILS NFR BLD: 37.3 % (ref 38–73)
NRBC BLD-RTO: 0 /100 WBC
PLATELET # BLD AUTO: 329 K/UL (ref 150–450)
PMV BLD AUTO: 10.4 FL (ref 9.2–12.9)
RBC # BLD AUTO: 4.42 M/UL (ref 4–5.4)
WBC # BLD AUTO: 3.54 K/UL (ref 3.9–12.7)

## 2021-12-29 PROCEDURE — 99999 PR PBB SHADOW E&M-EST. PATIENT-LVL III: CPT | Mod: PBBFAC,,, | Performed by: ADVANCED PRACTICE MIDWIFE

## 2021-12-29 PROCEDURE — 1159F PR MEDICATION LIST DOCUMENTED IN MEDICAL RECORD: ICD-10-PCS | Mod: CPTII,,, | Performed by: ADVANCED PRACTICE MIDWIFE

## 2021-12-29 PROCEDURE — 3075F PR MOST RECENT SYSTOLIC BLOOD PRESS GE 130-139MM HG: ICD-10-PCS | Mod: CPTII,,, | Performed by: ADVANCED PRACTICE MIDWIFE

## 2021-12-29 PROCEDURE — 85660 RBC SICKLE CELL TEST: CPT | Performed by: ADVANCED PRACTICE MIDWIFE

## 2021-12-29 PROCEDURE — 36415 COLL VENOUS BLD VENIPUNCTURE: CPT | Mod: PN | Performed by: ADVANCED PRACTICE MIDWIFE

## 2021-12-29 PROCEDURE — 3008F BODY MASS INDEX DOCD: CPT | Mod: CPTII,,, | Performed by: ADVANCED PRACTICE MIDWIFE

## 2021-12-29 PROCEDURE — 81001 URINALYSIS AUTO W/SCOPE: CPT | Performed by: ADVANCED PRACTICE MIDWIFE

## 2021-12-29 PROCEDURE — 3079F PR MOST RECENT DIASTOLIC BLOOD PRESSURE 80-89 MM HG: ICD-10-PCS | Mod: CPTII,,, | Performed by: ADVANCED PRACTICE MIDWIFE

## 2021-12-29 PROCEDURE — 99999 PR PBB SHADOW E&M-EST. PATIENT-LVL III: ICD-10-PCS | Mod: PBBFAC,,, | Performed by: ADVANCED PRACTICE MIDWIFE

## 2021-12-29 PROCEDURE — 86592 SYPHILIS TEST NON-TREP QUAL: CPT | Performed by: ADVANCED PRACTICE MIDWIFE

## 2021-12-29 PROCEDURE — 99214 OFFICE O/P EST MOD 30 MIN: CPT | Mod: TH,S$PBB,, | Performed by: ADVANCED PRACTICE MIDWIFE

## 2021-12-29 PROCEDURE — 86901 BLOOD TYPING SEROLOGIC RH(D): CPT | Performed by: ADVANCED PRACTICE MIDWIFE

## 2021-12-29 PROCEDURE — 3008F PR BODY MASS INDEX (BMI) DOCUMENTED: ICD-10-PCS | Mod: CPTII,,, | Performed by: ADVANCED PRACTICE MIDWIFE

## 2021-12-29 PROCEDURE — 87491 CHLMYD TRACH DNA AMP PROBE: CPT | Performed by: ADVANCED PRACTICE MIDWIFE

## 2021-12-29 PROCEDURE — 86762 RUBELLA ANTIBODY: CPT | Performed by: ADVANCED PRACTICE MIDWIFE

## 2021-12-29 PROCEDURE — 3079F DIAST BP 80-89 MM HG: CPT | Mod: CPTII,,, | Performed by: ADVANCED PRACTICE MIDWIFE

## 2021-12-29 PROCEDURE — 99214 PR OFFICE/OUTPT VISIT, EST, LEVL IV, 30-39 MIN: ICD-10-PCS | Mod: TH,S$PBB,, | Performed by: ADVANCED PRACTICE MIDWIFE

## 2021-12-29 PROCEDURE — 85025 COMPLETE CBC W/AUTO DIFF WBC: CPT | Performed by: ADVANCED PRACTICE MIDWIFE

## 2021-12-29 PROCEDURE — 1159F MED LIST DOCD IN RCRD: CPT | Mod: CPTII,,, | Performed by: ADVANCED PRACTICE MIDWIFE

## 2021-12-29 PROCEDURE — 3075F SYST BP GE 130 - 139MM HG: CPT | Mod: CPTII,,, | Performed by: ADVANCED PRACTICE MIDWIFE

## 2021-12-29 PROCEDURE — 80074 ACUTE HEPATITIS PANEL: CPT | Performed by: ADVANCED PRACTICE MIDWIFE

## 2021-12-29 PROCEDURE — 87591 N.GONORRHOEAE DNA AMP PROB: CPT | Performed by: ADVANCED PRACTICE MIDWIFE

## 2021-12-29 PROCEDURE — 99213 OFFICE O/P EST LOW 20 MIN: CPT | Mod: PBBFAC,TH,PN | Performed by: ADVANCED PRACTICE MIDWIFE

## 2021-12-29 PROCEDURE — 87389 HIV-1 AG W/HIV-1&-2 AB AG IA: CPT | Performed by: ADVANCED PRACTICE MIDWIFE

## 2021-12-29 RX ORDER — AMLODIPINE BESYLATE 5 MG/1
5 TABLET ORAL DAILY
COMMUNITY
End: 2023-01-25

## 2021-12-30 LAB
BACTERIA #/AREA URNS AUTO: NORMAL /HPF
BILIRUB UR QL STRIP: NEGATIVE
CLARITY UR REFRACT.AUTO: ABNORMAL
COLOR UR AUTO: YELLOW
GLUCOSE UR QL STRIP: NEGATIVE
HAV IGM SERPL QL IA: NEGATIVE
HBV CORE IGM SERPL QL IA: NEGATIVE
HBV SURFACE AG SERPL QL IA: NEGATIVE
HCV AB SERPL QL IA: NEGATIVE
HGB S BLD QL SOLY: NEGATIVE
HGB UR QL STRIP: NEGATIVE
HIV 1+2 AB+HIV1 P24 AG SERPL QL IA: NEGATIVE
KETONES UR QL STRIP: NEGATIVE
LEUKOCYTE ESTERASE UR QL STRIP: ABNORMAL
MICROSCOPIC COMMENT: NORMAL
NITRITE UR QL STRIP: NEGATIVE
PH UR STRIP: 6 [PH] (ref 5–8)
PROT UR QL STRIP: NEGATIVE
RBC #/AREA URNS AUTO: 0 /HPF (ref 0–4)
RPR SER QL: NORMAL
RUBV IGG SER-ACNC: 12.1 IU/ML
RUBV IGG SER-IMP: REACTIVE
SP GR UR STRIP: 1.02 (ref 1–1.03)
SQUAMOUS #/AREA URNS AUTO: 15 /HPF
URN SPEC COLLECT METH UR: ABNORMAL
WBC #/AREA URNS AUTO: 0 /HPF (ref 0–5)

## 2022-01-05 ENCOUNTER — LAB VISIT (OUTPATIENT)
Dept: LAB | Facility: HOSPITAL | Age: 37
End: 2022-01-05
Attending: ADVANCED PRACTICE MIDWIFE
Payer: MEDICAID

## 2022-01-05 ENCOUNTER — OFFICE VISIT (OUTPATIENT)
Dept: OBSTETRICS AND GYNECOLOGY | Facility: CLINIC | Age: 37
End: 2022-01-05
Payer: MEDICAID

## 2022-01-05 ENCOUNTER — PROCEDURE VISIT (OUTPATIENT)
Dept: OBSTETRICS AND GYNECOLOGY | Facility: CLINIC | Age: 37
End: 2022-01-05
Payer: MEDICAID

## 2022-01-05 VITALS
DIASTOLIC BLOOD PRESSURE: 82 MMHG | BODY MASS INDEX: 41.9 KG/M2 | WEIGHT: 282.88 LBS | HEIGHT: 69 IN | SYSTOLIC BLOOD PRESSURE: 118 MMHG

## 2022-01-05 DIAGNOSIS — O02.1 MISSED AB: ICD-10-CM

## 2022-01-05 DIAGNOSIS — O02.1 MISSED AB: Primary | ICD-10-CM

## 2022-01-05 DIAGNOSIS — Z34.80 SUPERVISION OF OTHER NORMAL PREGNANCY: ICD-10-CM

## 2022-01-05 LAB — HCG INTACT+B SERPL-ACNC: NORMAL MIU/ML

## 2022-01-05 PROCEDURE — 84702 CHORIONIC GONADOTROPIN TEST: CPT | Performed by: ADVANCED PRACTICE MIDWIFE

## 2022-01-05 PROCEDURE — 99999 PR PBB SHADOW E&M-EST. PATIENT-LVL III: ICD-10-PCS | Mod: PBBFAC,,, | Performed by: ADVANCED PRACTICE MIDWIFE

## 2022-01-05 PROCEDURE — 3074F SYST BP LT 130 MM HG: CPT | Mod: CPTII,,, | Performed by: ADVANCED PRACTICE MIDWIFE

## 2022-01-05 PROCEDURE — 99213 OFFICE O/P EST LOW 20 MIN: CPT | Mod: TH,S$PBB,, | Performed by: ADVANCED PRACTICE MIDWIFE

## 2022-01-05 PROCEDURE — 3008F BODY MASS INDEX DOCD: CPT | Mod: CPTII,,, | Performed by: ADVANCED PRACTICE MIDWIFE

## 2022-01-05 PROCEDURE — 3008F PR BODY MASS INDEX (BMI) DOCUMENTED: ICD-10-PCS | Mod: CPTII,,, | Performed by: ADVANCED PRACTICE MIDWIFE

## 2022-01-05 PROCEDURE — 76801 US OB/GYN PROCEDURE (VIEWPOINT): ICD-10-PCS | Mod: 26,S$PBB,, | Performed by: OBSTETRICS & GYNECOLOGY

## 2022-01-05 PROCEDURE — 3074F PR MOST RECENT SYSTOLIC BLOOD PRESSURE < 130 MM HG: ICD-10-PCS | Mod: CPTII,,, | Performed by: ADVANCED PRACTICE MIDWIFE

## 2022-01-05 PROCEDURE — 76801 OB US < 14 WKS SINGLE FETUS: CPT | Mod: PBBFAC,PN | Performed by: OBSTETRICS & GYNECOLOGY

## 2022-01-05 PROCEDURE — 99213 PR OFFICE/OUTPT VISIT, EST, LEVL III, 20-29 MIN: ICD-10-PCS | Mod: TH,S$PBB,, | Performed by: ADVANCED PRACTICE MIDWIFE

## 2022-01-05 PROCEDURE — 3079F DIAST BP 80-89 MM HG: CPT | Mod: CPTII,,, | Performed by: ADVANCED PRACTICE MIDWIFE

## 2022-01-05 PROCEDURE — 99213 OFFICE O/P EST LOW 20 MIN: CPT | Mod: PBBFAC,25,TH,PN | Performed by: ADVANCED PRACTICE MIDWIFE

## 2022-01-05 PROCEDURE — 36415 COLL VENOUS BLD VENIPUNCTURE: CPT | Performed by: ADVANCED PRACTICE MIDWIFE

## 2022-01-05 PROCEDURE — 99999 PR PBB SHADOW E&M-EST. PATIENT-LVL III: CPT | Mod: PBBFAC,,, | Performed by: ADVANCED PRACTICE MIDWIFE

## 2022-01-05 PROCEDURE — 3079F PR MOST RECENT DIASTOLIC BLOOD PRESSURE 80-89 MM HG: ICD-10-PCS | Mod: CPTII,,, | Performed by: ADVANCED PRACTICE MIDWIFE

## 2022-01-05 RX ORDER — IBUPROFEN 800 MG/1
800 TABLET ORAL EVERY 8 HOURS PRN
Qty: 30 TABLET | Refills: 1 | Status: SHIPPED | OUTPATIENT
Start: 2022-01-05 | End: 2022-01-06 | Stop reason: SDUPTHER

## 2022-01-05 RX ORDER — MISOPROSTOL 200 UG/1
800 TABLET ORAL ONCE
Qty: 4 TABLET | Refills: 0 | Status: SHIPPED | OUTPATIENT
Start: 2022-01-05 | End: 2022-01-06 | Stop reason: SDUPTHER

## 2022-01-05 NOTE — PROGRESS NOTES
2022-presents for new OB visit and previous risk assessment is reviewed and attached to today's note for adhesive charting.  Unfortunately, ultrasound revealed intrauterine gestational sac with no fetal pole or yolk sac visualized and suspected missed A/B at 10 weeks 5 days with gestational sac approximately 8 weeks size.  Findings were discussed with both parents, who were both obviously upset.  Options discussed including expectant, medical or surgical and it was determined to proceed with medical management involving Cytotec 800 mcg vaginally, which she plans to use tonight.  Clear direction was provided and prescription was sent.  Comfort and support was given and will be ongoing throughout this time.  Miscarriage precautions reviewed and will remain active-verbalizes understanding.  Of note, A positive            CC: Absence of menses risk assessment Kerrie Piña is a 36 y.o. female  presents with complaint of absence of menstruation.  She denies nausea/vomIting/abdominal pain/bleeding.  UPT is positive.    Menstrual History  Menarche 13, length 7, cycle 28  LMP 10/22/2021, EDC 2022, therefore 9 weeks 6 days    Past Medical History:   Diagnosis Date    Abnormal Pap smear 2005    colposcopy    Abnormal Pap smear of cervix         Asthma affecting pregnancy, antepartum 3/12/2018    Miscarriage      Past Surgical History:   Procedure Laterality Date    Right Knee       Social History     Socioeconomic History    Marital status: Single   Tobacco Use    Smoking status: Never Smoker    Smokeless tobacco: Never Used   Substance and Sexual Activity    Alcohol use: No    Drug use: No    Sexual activity: Yes     Partners: Male     Birth control/protection: None     Family History   Problem Relation Age of Onset    Breast cancer Neg Hx     Colon cancer Neg Hx     Ovarian cancer Neg Hx     Stroke Neg Hx     Thrombosis Neg Hx      OB History     "Para Term  AB Living   8 4 4 0 3 4   SAB IAB Ectopic Multiple Live Births   3 0 0 0 4      # Outcome Date GA Lbr Drake/2nd Weight Sex Delivery Anes PTL Lv   8 Current            7 SAB 05/10/20           6 Term 18 39w6d / 00:05 3.47 kg (7 lb 10.4 oz) F Vag-Spont EPI N NOAM   5 Term 02/10/15 40w0d  3.912 kg (8 lb 10 oz) F Vag-Spont EPI N NOAM   4 Term 03 40w0d 01:00 3.969 kg (8 lb 12 oz) F Vag-Spont None N NOAM   3 Term 99 40w0d 12:00 4.252 kg (9 lb 6 oz) M Vag-Spont None N NOAM   2 SAB            1 SAB                /82   Ht 5' 9" (1.753 m)   Wt 128.3 kg (282 lb 13.6 oz)   LMP 10/22/2021   BMI 41.77 kg/m²         ROS:  GENERAL: Denies weight gain or weight loss. Feeling well overall.   SKIN: Denies rash or lesions.   HEAD: Denies head injury or headache.   NODES: Denies enlarged lymph nodes.   CHEST: Denies chest pain or shortness of breath.   CARDIOVASCULAR: Denies palpitations or left sided chest pain.   ABDOMEN: No abdominal pain, constipation, diarrhea, nausea, vomiting or rectal bleeding.   URINARY: No frequency, dysuria, hematuria, or burning on urination.  REPRODUCTIVE: See HPI.   BREASTS: The patient performs breast self-examination and denies pain, lumps, or nipple discharge.   HEMATOLOGIC: No easy bruisability or excessive bleeding.   MUSCULOSKELETAL: Denies joint pain or swelling.   NEUROLOGIC: Denies syncope or weakness.   PSYCHIATRIC: Denies depression, anxiety or mood swings.    PE:   APPEARANCE: Well nourished, well developed, in no acute distress.  AFFECT: WNL, alert and oriented x 3.  SKIN: No acne or hirsutism.  NECK: Neck symmetric without masses or thyromegaly.  NODES: No inguinal, cervical, axillary or femoral lymph node enlargement.  CHEST: Good respiratory effort.   ABDOMEN: Soft. No tenderness or masses. No hepatosplenomegaly. No hernias.  BREASTS: Symmetrical, no skin changes or visible lesions. No palpable masses, nipple discharge bilaterally.  PELVIC: Normal " external female genitalia without lesions. Normal hair distribution. Adequate perineal body, normal urethral meatus. Vagina moist and well rugated without lesions or discharge. Cervix pink, without lesions, discharge or tenderness. No significant cystocele or rectocele. Bimanual exam shows uterus is 10 weeks, regular, mobile and nontender. Adnexa without masses or tenderness.  EXTREMITIES: No edema.          ASSESSMENT and PLAN:  36-year-old  7 para 4 with secondary amenorrhea and positive UPT.  LMP 10/22/2020 1-year-old EDC 2022, therefore 9 weeks and 6 days gestation today.  Prenatal lab today.  GC chlamydia swab today.  Last Pap 2019-negative with Trichomonas.  Taking prenatal vitamins.  Aware CDC recommendation regarding COVID vaccination and has received the Moderna series.  Return to office 1 week for dating ultrasound and new OB visit.  First trimester expectations-information provided.  Miscarriage precautions reviewed    ICD-10-CM ICD-9-CM    1. Missed ab  O02.1 632 HCG, Quantitative         Patient was counseled today on proper weight gain based on the [place] [employer] [facility]'s recommendations based on her pre-pregnancy weight. Discussed foods to avoid in pregnancy (i.e. sushi, fish that are high in mercury, deli meat, and unpasteurized cheeses). Discussed prenatal vitamin options (i.e. stool softener, DHA). Contingency screen offered - patient desires.    No follow-ups on file.

## 2022-01-05 NOTE — PATIENT INSTRUCTIONS
"Patient Education       Miscarriage   The Basics   Written by the doctors and editors at [facility]   What is miscarriage? -- Miscarriage is when a pregnancy ends before a person has been pregnant for 20 weeks. (A normal pregnancy lasts about 40 weeks.) The uterus is the part of your body where a baby grows (figure 1). A "fetus" is what a baby is called while it is growing inside you.  Miscarriage is also called "pregnancy loss."  What causes miscarriage? -- Different problems can cause miscarriage. It can happen when:  · The fetus begins to develop but then stops growing, often because it has genetic problems  · The pregnant person has certain medical problems, such as poorly controlled diabetes, or problems with the shape of the uterus  What are the symptoms of miscarriage? -- The most common symptoms are bleeding from the vagina and belly pain or cramping. See your doctor or nurse right away if you are pregnant and have these symptoms. If you are not sure if you are pregnant, take a home pregnancy test.  You should also see your doctor or nurse if you are pregnant and:  · You have a fever of 100°F (37.8°C) or higher  · Anything solid comes out of your vagina  · Thick fluid that smells bad comes out of your vagina  If you cannot get in touch with your doctor or nurse, or if you have heavy bleeding (soaking a pad in 1 to 2 hours), go to the emergency room.  These symptoms do not always mean that you are having a miscarriage. Your doctor or nurse can help figure out if anything is wrong.  Are there tests I should have? -- Your doctor or nurse might be able to tell if you have had a miscarriage just by asking you questions and doing a pelvic exam. They might also look at your uterus using an ultrasound, a machine that uses sound waves to create a picture of the inside of your body. That way, the doctor can look at the fetus and check whether it has a heartbeat. If your fetus has a heartbeat, you have not had a " "miscarriage. Your doctor can tell you if you are likely to have one. You might also need a blood test and then another blood test several days later to check on your pregnancy.  If you have a negative blood type (for example, "O negative"), you might need a special injection to help prevent problems in future pregnancies. If you don't know your blood type, ask your doctor or nurse to check it.  How is miscarriage treated? -- It is not possible to stop a miscarriage that has already started. If you have had a miscarriage, the fetus and the extra fluid in your uterus need to leave your body. Your options include:   · Waiting to let it exit through your vagina by itself  · Medicine to help your uterus get rid of what was inside it  · Surgery to remove the contents of your uterus leftover from pregnancy  In most cases, it is up to you which of these to choose. To help you make the decision, your doctor or nurse can talk to you about each option. This is a very personal choice. Some people prefer to wait and let things happen naturally. Other people prefer specific treatment so they can have a better idea of what to expect and how long it will take. Sometimes, depending on your situation, 1 or more of these might not be an option.   Can I prevent a miscarriage? -- There is no way to make sure that you will not have a miscarriage. But you can lower your chances of having one by avoiding cigarettes, alcohol, cocaine, and injury to your belly. Having a fever or some kinds of infections puts you at risk for miscarriage, so you should also talk to your doctor about how to avoid getting some kinds of infection.  Some of the invasive tests that can be done to check on the fetus during pregnancy can, in rare cases, cause miscarriage. If your doctor or nurse suggests any of these tests, ask whether the test could cause a miscarriage. Also, some medicines or other treatments can be harmful to a fetus. Before you take any medicine " (herbal, over-the-counter, or prescription) or have a medical treatment or X-ray, ask your doctor or nurse whether it could hurt your fetus.  If you have had a miscarriage in the past and you want to get pregnant again, your doctor or nurse might suggest taking daily prenatal vitamins and low-dose aspirin before and during your next pregnancy. This might lower your risk of having another miscarriage. Aspirin is not usually recommended for people who have not had a past miscarriage, or for people who have never given birth before. Do not take aspirin or any other medicines unless your doctor, nurse, or midwife tells you it is safe.  What do I do after a miscarriage? -- After you have had a miscarriage, you should not have sex or put anything in your vagina for 2 weeks. Ask your doctor or nurse when you can start using birth control again after having a miscarriage.  It's normal to feel sad or anxious, or have other emotions, after you have a miscarriage. If you are struggling or think you might be depressed, mention it to your doctor or nurse. There are treatments and coping strategies that can help.  Will I be able to have a normal pregnancy after a miscarriage? -- Probably. Most people who have a miscarriage go on to have healthy pregnancies. Still, people who have had a miscarriage are more likely than those who have not to have other miscarriages.  Your doctor will tell you if you should wait before trying to get pregnant again. In most cases, it's safe to start trying again as soon as you feel ready. If you have 3 or more miscarriages, your doctor might want to run some tests to try to figure out the reason.  All topics are updated as new evidence becomes available and our peer review process is complete.  This topic retrieved from [employer] on: Sep 21, 2021.  Topic 87766 Version 12.0  Release: 29.4.2 - C29.263  © 2021 UpToDate, Inc. and/or its affiliates. All rights reserved.  figure 1: Female reproductive  anatomy     These are the internal organs that make up the female reproductive system.  Graphic 74635 Version 6.0    Consumer Information Use and Disclaimer   This information is not specific medical advice and does not replace information you receive from your health care provider. This is only a brief summary of general information. It does NOT include all information about conditions, illnesses, injuries, tests, procedures, treatments, therapies, discharge instructions or life-style choices that may apply to you. You must talk with your health care provider for complete information about your health and treatment options. This information should not be used to decide whether or not to accept your health care provider's advice, instructions or recommendations. Only your health care provider has the knowledge and training to provide advice that is right for you. The use of this information is governed by the [employer] End User License Agreement, available at https://www.[employer]/en/solutions/[employer]/about/mayo.The use of [employer] content is governed by the [employer] Terms of Use. ©2021 UpToDate, Inc. All rights reserved.  Copyright   © 2021 UpToDate, Inc. and/or its affiliates. All rights reserved.

## 2022-01-06 PROBLEM — O02.1 MISSED AB: Status: ACTIVE | Noted: 2022-01-06

## 2022-01-06 LAB
C TRACH DNA SPEC QL NAA+PROBE: NOT DETECTED
N GONORRHOEA DNA SPEC QL NAA+PROBE: NOT DETECTED

## 2022-01-06 RX ORDER — MISOPROSTOL 200 UG/1
800 TABLET ORAL ONCE
Qty: 4 TABLET | Refills: 0 | Status: SHIPPED | OUTPATIENT
Start: 2022-01-06 | End: 2022-01-06

## 2022-01-06 RX ORDER — MISOPROSTOL 200 UG/1
800 TABLET ORAL ONCE
Qty: 4 TABLET | Refills: 0 | Status: SHIPPED | OUTPATIENT
Start: 2022-01-06 | End: 2022-01-07 | Stop reason: SDUPTHER

## 2022-01-06 RX ORDER — IBUPROFEN 800 MG/1
800 TABLET ORAL EVERY 8 HOURS PRN
Qty: 30 TABLET | Refills: 1 | Status: SHIPPED | OUTPATIENT
Start: 2022-01-06 | End: 2023-01-06

## 2022-01-06 NOTE — TELEPHONE ENCOUNTER
----- Message from Jacqueline Foreman sent at 1/6/2022  8:10 AM CST -----  Contact: [facility]/ David [phone]  Pharmacy is calling to clarify an RX.  RX name:  miSOPROStoL (CYTOTEC) 200 MCG Tab  What do they need to clarify:  Should it by by mouth of vaginally  Comments:

## 2022-01-07 ENCOUNTER — PATIENT MESSAGE (OUTPATIENT)
Dept: OBSTETRICS AND GYNECOLOGY | Facility: CLINIC | Age: 37
End: 2022-01-07
Payer: MEDICAID

## 2022-01-07 RX ORDER — MISOPROSTOL 200 UG/1
800 TABLET ORAL ONCE
Qty: 4 TABLET | Refills: 0 | Status: SHIPPED | OUTPATIENT
Start: 2022-01-07 | End: 2023-01-25

## 2022-01-10 RX ORDER — MISOPROSTOL 200 UG/1
800 TABLET ORAL ONCE
Qty: 4 TABLET | Refills: 0 | OUTPATIENT
Start: 2022-01-10 | End: 2022-01-10

## 2022-01-12 ENCOUNTER — PATIENT MESSAGE (OUTPATIENT)
Dept: OBSTETRICS AND GYNECOLOGY | Facility: CLINIC | Age: 37
End: 2022-01-12
Payer: MEDICAID

## 2022-01-12 ENCOUNTER — LAB VISIT (OUTPATIENT)
Dept: LAB | Facility: HOSPITAL | Age: 37
End: 2022-01-12
Attending: ADVANCED PRACTICE MIDWIFE
Payer: MEDICAID

## 2022-01-12 DIAGNOSIS — O02.1 MISSED AB: Primary | ICD-10-CM

## 2022-01-12 DIAGNOSIS — O02.1 MISSED AB: ICD-10-CM

## 2022-01-12 LAB — HCG INTACT+B SERPL-ACNC: 1699 MIU/ML

## 2022-01-12 PROCEDURE — 84702 CHORIONIC GONADOTROPIN TEST: CPT | Performed by: ADVANCED PRACTICE MIDWIFE

## 2022-01-12 PROCEDURE — 36415 COLL VENOUS BLD VENIPUNCTURE: CPT | Performed by: ADVANCED PRACTICE MIDWIFE

## 2022-01-18 ENCOUNTER — PATIENT MESSAGE (OUTPATIENT)
Dept: OBSTETRICS AND GYNECOLOGY | Facility: CLINIC | Age: 37
End: 2022-01-18
Payer: MEDICAID

## 2022-01-18 ENCOUNTER — TELEPHONE (OUTPATIENT)
Dept: OBSTETRICS AND GYNECOLOGY | Facility: CLINIC | Age: 37
End: 2022-01-18
Payer: MEDICAID

## 2022-01-18 DIAGNOSIS — O02.1 MISSED AB: Primary | ICD-10-CM

## 2022-01-18 NOTE — TELEPHONE ENCOUNTER
Had called and discussed latest beta HCG result of 1699 with patient following Cytotec management missed A/B.  Very light spotting now with no pain or fever.  Miscarriage precautions remain active and will repeat beta HCG in 1 week   160.02

## 2022-01-20 ENCOUNTER — LAB VISIT (OUTPATIENT)
Dept: LAB | Facility: HOSPITAL | Age: 37
End: 2022-01-20
Attending: ADVANCED PRACTICE MIDWIFE
Payer: MEDICAID

## 2022-01-20 DIAGNOSIS — O02.1 MISSED AB: ICD-10-CM

## 2022-01-20 LAB — HCG INTACT+B SERPL-ACNC: 148 MIU/ML

## 2022-01-20 PROCEDURE — 84702 CHORIONIC GONADOTROPIN TEST: CPT | Performed by: ADVANCED PRACTICE MIDWIFE

## 2022-01-20 PROCEDURE — 36415 COLL VENOUS BLD VENIPUNCTURE: CPT | Performed by: ADVANCED PRACTICE MIDWIFE

## 2022-01-31 DIAGNOSIS — O02.1 MISSED AB: Primary | ICD-10-CM

## 2022-01-31 NOTE — PROGRESS NOTES
2022.  Ongoing follow-up of Cytotec management of missed A/B. Last beta HCG on  was 148 and decreasing.  She is doing well and we will repeat beta HCG on 2022 at the [place].  Order placed          2022-presents for new OB visit and previous risk assessment is reviewed and attached to today's note for adhesive charting.  Unfortunately, ultrasound revealed intrauterine gestational sac with no fetal pole or yolk sac visualized and suspected missed A/B at 10 weeks 5 days with gestational sac approximately 8 weeks size.  Findings were discussed with both parents, who were both obviously upset.  Options discussed including expectant, medical or surgical and it was determined to proceed with medical management involving Cytotec 800 mcg vaginally, which she plans to use tonight.  Clear direction was provided and prescription was sent.  Comfort and support was given and will be ongoing throughout this time.  Miscarriage precautions reviewed and will remain active-verbalizes understanding.  Of note, A positive            CC: Absence of menses risk assessment Kerrie Piña is a 36 y.o. female  presents with complaint of absence of menstruation.  She denies nausea/vomIting/abdominal pain/bleeding.  UPT is positive.    Menstrual History  Menarche 13, length 7, cycle 26  LMP 10/22/2021, EDC 2022, therefore 9 weeks 6 days    Past Medical History:   Diagnosis Date    Abnormal Pap smear 2005    colposcopy    Abnormal Pap smear of cervix         Asthma affecting pregnancy, antepartum 3/12/2018    Miscarriage      Past Surgical History:   Procedure Laterality Date    Right Knee       Social History     Socioeconomic History    Marital status:    Tobacco Use    Smoking status: Never Smoker    Smokeless tobacco: Never Used   Substance and Sexual Activity    Alcohol use: No    Drug use: No    Sexual activity: Yes     Partners: Male     Birth  control/protection: None     Family History   Problem Relation Age of Onset    Breast cancer Neg Hx     Colon cancer Neg Hx     Ovarian cancer Neg Hx     Stroke Neg Hx     Thrombosis Neg Hx      OB History    Para Term  AB Living   8 4 4 0 3 4   SAB IAB Ectopic Multiple Live Births   3 0 0 0 4      # Outcome Date GA Lbr Drake/2nd Weight Sex Delivery Anes PTL Lv   8 Current            7 SAB 05/10/20           6 Term 18 39w6d / 00:05 3.47 kg (7 lb 10.4 oz) F Vag-Spont EPI N NOAM   5 Term 02/10/15 40w0d  3.912 kg (8 lb 10 oz) F Vag-Spont EPI N NOAM   4 Term 03 40w0d 01:00 3.969 kg (8 lb 12 oz) F Vag-Spont None N NOAM   3 Term 99 40w0d 12:00 4.252 kg (9 lb 6 oz) M Vag-Spont None N NOAM   2 SAB            1 SAB                LMP 10/22/2021         ROS:  GENERAL: Denies weight gain or weight loss. Feeling well overall.   SKIN: Denies rash or lesions.   HEAD: Denies head injury or headache.   NODES: Denies enlarged lymph nodes.   CHEST: Denies chest pain or shortness of breath.   CARDIOVASCULAR: Denies palpitations or left sided chest pain.   ABDOMEN: No abdominal pain, constipation, diarrhea, nausea, vomiting or rectal bleeding.   URINARY: No frequency, dysuria, hematuria, or burning on urination.  REPRODUCTIVE: See HPI.   BREASTS: The patient performs breast self-examination and denies pain, lumps, or nipple discharge.   HEMATOLOGIC: No easy bruisability or excessive bleeding.   MUSCULOSKELETAL: Denies joint pain or swelling.   NEUROLOGIC: Denies syncope or weakness.   PSYCHIATRIC: Denies depression, anxiety or mood swings.    PE:   APPEARANCE: Well nourished, well developed, in no acute distress.  AFFECT: WNL, alert and oriented x 3.  SKIN: No acne or hirsutism.  NECK: Neck symmetric without masses or thyromegaly.  NODES: No inguinal, cervical, axillary or femoral lymph node enlargement.  CHEST: Good respiratory effort.   ABDOMEN: Soft. No tenderness or masses. No hepatosplenomegaly. No  hernias.  BREASTS: Symmetrical, no skin changes or visible lesions. No palpable masses, nipple discharge bilaterally.  PELVIC: Normal external female genitalia without lesions. Normal hair distribution. Adequate perineal body, normal urethral meatus. Vagina moist and well rugated without lesions or discharge. Cervix pink, without lesions, discharge or tenderness. No significant cystocele or rectocele. Bimanual exam shows uterus is 10 weeks, regular, mobile and nontender. Adnexa without masses or tenderness.  EXTREMITIES: No edema.          ASSESSMENT and PLAN:  36-year-old  7 para 4 with secondary amenorrhea and positive UPT.  LMP 10/22/2020 1-year-old EDC 2022, therefore 9 weeks and 6 days gestation today.  Prenatal lab today.  GC chlamydia swab today.  Last Pap 2019-negative with Trichomonas.  Taking prenatal vitamins.  Aware CDC recommendation regarding COVID vaccination and has received the Moderna series.  Return to office 1 week for dating ultrasound and new OB visit.  First trimester expectations-information provided.  Miscarriage precautions reviewed    ICD-10-CM ICD-9-CM    1. Missed ab  O02.1 632 HCG, Quantitative         Patient was counseled today on proper weight gain based on the [place] of Medicine's recommendations based on her pre-pregnancy weight. Discussed foods to avoid in pregnancy (i.e. sushi, fish that are high in mercury, deli meat, and unpasteurized cheeses). Discussed prenatal vitamin options (i.e. stool softener, DHA). Contingency screen offered - patient desires.    No follow-ups on file.

## 2022-02-01 ENCOUNTER — TELEPHONE (OUTPATIENT)
Dept: OBSTETRICS AND GYNECOLOGY | Facility: CLINIC | Age: 37
End: 2022-02-01
Payer: MEDICAID

## 2022-02-01 NOTE — TELEPHONE ENCOUNTER
----- Message from Kerrie Neff CNM sent at 1/31/2022  8:31 AM CST -----  Patient has had Cytotec management of missed A/B and I am following her decreasing beta HCG levels.  She needs another beta HCG on Thursday 01/03/2022 at the [place]  I have placed the order.  Called the patient.  This needs to be linked please.  Thank you

## 2022-02-03 ENCOUNTER — LAB VISIT (OUTPATIENT)
Dept: LAB | Facility: HOSPITAL | Age: 37
End: 2022-02-03
Attending: ADVANCED PRACTICE MIDWIFE
Payer: MEDICAID

## 2022-02-03 DIAGNOSIS — O02.1 MISSED AB: ICD-10-CM

## 2022-02-03 LAB — HCG INTACT+B SERPL-ACNC: 11 MIU/ML

## 2022-02-03 PROCEDURE — 84702 CHORIONIC GONADOTROPIN TEST: CPT | Performed by: ADVANCED PRACTICE MIDWIFE

## 2022-02-03 PROCEDURE — 36415 COLL VENOUS BLD VENIPUNCTURE: CPT | Performed by: ADVANCED PRACTICE MIDWIFE

## 2022-02-08 ENCOUNTER — TELEPHONE (OUTPATIENT)
Dept: OBSTETRICS AND GYNECOLOGY | Facility: CLINIC | Age: 37
End: 2022-02-08
Payer: MEDICAID

## 2022-02-08 DIAGNOSIS — O02.1 MISSED AB: Primary | ICD-10-CM

## 2022-02-08 NOTE — TELEPHONE ENCOUNTER
Called patient with beta HCG result of 11 following Cytotec management of missed A/B.  Will repeat in 2 weeks unless she has menses 1st.  She will let me know if that occurs and we will  the beta HCG.  Order is placed

## 2022-02-10 ENCOUNTER — TELEPHONE (OUTPATIENT)
Dept: OBSTETRICS AND GYNECOLOGY | Facility: CLINIC | Age: 37
End: 2022-02-10
Payer: MEDICAID

## 2022-02-10 RX ORDER — NORETHINDRONE ACETATE AND ETHINYL ESTRADIOL 1MG-20(21)
1 KIT ORAL DAILY
Qty: 30 TABLET | Refills: 9 | Status: SHIPPED | OUTPATIENT
Start: 2022-02-10 | End: 2022-08-29

## 2022-02-10 NOTE — TELEPHONE ENCOUNTER
Called to address request for birth control pills.  Last beta HCG was 11 following the Cytotec managed missed A/B and is waiting for next menses.  I have sent a prescription for Microgestin and discussed directions, risks and precautions and use backup method the 1st month.  If she has had no menses by 2 weeks to call and let me know so we can do repeat beta hCG.  Verbalizes understanding and prescription is sent

## 2022-02-11 ENCOUNTER — TELEPHONE (OUTPATIENT)
Dept: OBSTETRICS AND GYNECOLOGY | Facility: CLINIC | Age: 37
End: 2022-02-11
Payer: MEDICAID

## 2022-02-11 NOTE — TELEPHONE ENCOUNTER
Called and discussed later spin HCG of 11 and a desire for birth control pills.  Normotensive, nonsmoker.  Has used birth control pills before.  Advised to start as directed with the 1st day of next menses.  If no menses in 2 weeks to let me know so that we can order another beta HCG.  Prescription for Microgestin is sent

## 2022-02-12 ENCOUNTER — PATIENT MESSAGE (OUTPATIENT)
Dept: OBSTETRICS AND GYNECOLOGY | Facility: CLINIC | Age: 37
End: 2022-02-12
Payer: MEDICAID

## 2022-05-26 NOTE — PROGRESS NOTES
"Doing well   Ultrasound today with EFW47%, Bpp 8/8, MVP 3.2cm, ENE 10.8cm, reviewed with pt and    PTL precautions and FKCs reviewed, when to go to hospital   Discussed upcoming GBS testing   "quiet Hours" reviewed  " 123

## 2022-08-26 ENCOUNTER — OFFICE VISIT (OUTPATIENT)
Dept: OBSTETRICS AND GYNECOLOGY | Facility: CLINIC | Age: 37
End: 2022-08-26
Payer: MEDICAID

## 2022-08-26 ENCOUNTER — LAB VISIT (OUTPATIENT)
Dept: LAB | Facility: HOSPITAL | Age: 37
End: 2022-08-26
Attending: ADVANCED PRACTICE MIDWIFE
Payer: MEDICAID

## 2022-08-26 ENCOUNTER — PROCEDURE VISIT (OUTPATIENT)
Dept: OBSTETRICS AND GYNECOLOGY | Facility: CLINIC | Age: 37
End: 2022-08-26
Payer: MEDICAID

## 2022-08-26 VITALS
DIASTOLIC BLOOD PRESSURE: 82 MMHG | SYSTOLIC BLOOD PRESSURE: 118 MMHG | BODY MASS INDEX: 41.65 KG/M2 | WEIGHT: 281.19 LBS | HEIGHT: 69 IN

## 2022-08-26 DIAGNOSIS — Z32.01 POSITIVE PREGNANCY TEST: ICD-10-CM

## 2022-08-26 DIAGNOSIS — O99.210 OBESITY AFFECTING PREGNANCY, ANTEPARTUM: ICD-10-CM

## 2022-08-26 DIAGNOSIS — Z87.59 HISTORY OF MISCARRIAGE, NOT CURRENTLY PREGNANT: ICD-10-CM

## 2022-08-26 DIAGNOSIS — Z32.00 POSSIBLE PREGNANCY: Primary | ICD-10-CM

## 2022-08-26 LAB
ABO + RH BLD: NORMAL
B-HCG UR QL: POSITIVE
BACTERIA #/AREA URNS AUTO: ABNORMAL /HPF
BASOPHILS # BLD AUTO: 0.05 K/UL (ref 0–0.2)
BASOPHILS NFR BLD: 1 % (ref 0–1.9)
BILIRUB UR QL STRIP: NEGATIVE
BLD GP AB SCN CELLS X3 SERPL QL: NORMAL
CLARITY UR REFRACT.AUTO: CLEAR
COLOR UR AUTO: YELLOW
CTP QC/QA: YES
DIFFERENTIAL METHOD: ABNORMAL
EOSINOPHIL # BLD AUTO: 0.1 K/UL (ref 0–0.5)
EOSINOPHIL NFR BLD: 1.9 % (ref 0–8)
ERYTHROCYTE [DISTWIDTH] IN BLOOD BY AUTOMATED COUNT: 14 % (ref 11.5–14.5)
GLUCOSE UR QL STRIP: NEGATIVE
HCT VFR BLD AUTO: 41.9 % (ref 37–48.5)
HGB BLD-MCNC: 14 G/DL (ref 12–16)
HGB S BLD QL SOLY: NEGATIVE
HGB UR QL STRIP: NEGATIVE
IMM GRANULOCYTES # BLD AUTO: 0.01 K/UL (ref 0–0.04)
IMM GRANULOCYTES NFR BLD AUTO: 0.2 % (ref 0–0.5)
KETONES UR QL STRIP: NEGATIVE
LEUKOCYTE ESTERASE UR QL STRIP: ABNORMAL
LYMPHOCYTES # BLD AUTO: 1.7 K/UL (ref 1–4.8)
LYMPHOCYTES NFR BLD: 35.4 % (ref 18–48)
MCH RBC QN AUTO: 31.5 PG (ref 27–31)
MCHC RBC AUTO-ENTMCNC: 33.4 G/DL (ref 32–36)
MCV RBC AUTO: 94 FL (ref 82–98)
MICROSCOPIC COMMENT: ABNORMAL
MONOCYTES # BLD AUTO: 0.5 K/UL (ref 0.3–1)
MONOCYTES NFR BLD: 9.7 % (ref 4–15)
NEUTROPHILS # BLD AUTO: 2.5 K/UL (ref 1.8–7.7)
NEUTROPHILS NFR BLD: 51.8 % (ref 38–73)
NITRITE UR QL STRIP: NEGATIVE
NRBC BLD-RTO: 0 /100 WBC
PH UR STRIP: 7 [PH] (ref 5–8)
PLATELET # BLD AUTO: 312 K/UL (ref 150–450)
PMV BLD AUTO: 10.9 FL (ref 9.2–12.9)
PROGEST SERPL-MCNC: 17.3 NG/ML
PROT UR QL STRIP: ABNORMAL
RBC # BLD AUTO: 4.44 M/UL (ref 4–5.4)
SP GR UR STRIP: 1.02 (ref 1–1.03)
SQUAMOUS #/AREA URNS AUTO: 12 /HPF
TSH SERPL DL<=0.005 MIU/L-ACNC: 1.71 UIU/ML (ref 0.4–4)
URN SPEC COLLECT METH UR: ABNORMAL
WBC # BLD AUTO: 4.83 K/UL (ref 3.9–12.7)
WBC #/AREA URNS AUTO: 3 /HPF (ref 0–5)

## 2022-08-26 PROCEDURE — 85660 RBC SICKLE CELL TEST: CPT | Performed by: ADVANCED PRACTICE MIDWIFE

## 2022-08-26 PROCEDURE — 99999 PR PBB SHADOW E&M-EST. PATIENT-LVL III: CPT | Mod: PBBFAC,,, | Performed by: ADVANCED PRACTICE MIDWIFE

## 2022-08-26 PROCEDURE — 86592 SYPHILIS TEST NON-TREP QUAL: CPT | Performed by: ADVANCED PRACTICE MIDWIFE

## 2022-08-26 PROCEDURE — 76801 US OB/GYN PROCEDURE (VIEWPOINT): ICD-10-PCS | Mod: 26,S$PBB,, | Performed by: OBSTETRICS & GYNECOLOGY

## 2022-08-26 PROCEDURE — 99214 OFFICE O/P EST MOD 30 MIN: CPT | Mod: TH,S$PBB,, | Performed by: ADVANCED PRACTICE MIDWIFE

## 2022-08-26 PROCEDURE — 76801 OB US < 14 WKS SINGLE FETUS: CPT | Mod: PBBFAC,PN | Performed by: OBSTETRICS & GYNECOLOGY

## 2022-08-26 PROCEDURE — 1159F PR MEDICATION LIST DOCUMENTED IN MEDICAL RECORD: ICD-10-PCS | Mod: CPTII,,, | Performed by: ADVANCED PRACTICE MIDWIFE

## 2022-08-26 PROCEDURE — 3074F PR MOST RECENT SYSTOLIC BLOOD PRESSURE < 130 MM HG: ICD-10-PCS | Mod: CPTII,,, | Performed by: ADVANCED PRACTICE MIDWIFE

## 2022-08-26 PROCEDURE — 85025 COMPLETE CBC W/AUTO DIFF WBC: CPT | Performed by: ADVANCED PRACTICE MIDWIFE

## 2022-08-26 PROCEDURE — 84443 ASSAY THYROID STIM HORMONE: CPT | Performed by: ADVANCED PRACTICE MIDWIFE

## 2022-08-26 PROCEDURE — 3079F PR MOST RECENT DIASTOLIC BLOOD PRESSURE 80-89 MM HG: ICD-10-PCS | Mod: CPTII,,, | Performed by: ADVANCED PRACTICE MIDWIFE

## 2022-08-26 PROCEDURE — 80074 ACUTE HEPATITIS PANEL: CPT | Performed by: ADVANCED PRACTICE MIDWIFE

## 2022-08-26 PROCEDURE — 81001 URINALYSIS AUTO W/SCOPE: CPT | Performed by: ADVANCED PRACTICE MIDWIFE

## 2022-08-26 PROCEDURE — 3074F SYST BP LT 130 MM HG: CPT | Mod: CPTII,,, | Performed by: ADVANCED PRACTICE MIDWIFE

## 2022-08-26 PROCEDURE — 81025 URINE PREGNANCY TEST: CPT | Mod: PBBFAC,PN | Performed by: ADVANCED PRACTICE MIDWIFE

## 2022-08-26 PROCEDURE — 87491 CHLMYD TRACH DNA AMP PROBE: CPT | Performed by: ADVANCED PRACTICE MIDWIFE

## 2022-08-26 PROCEDURE — 99999 PR PBB SHADOW E&M-EST. PATIENT-LVL III: ICD-10-PCS | Mod: PBBFAC,,, | Performed by: ADVANCED PRACTICE MIDWIFE

## 2022-08-26 PROCEDURE — 86850 RBC ANTIBODY SCREEN: CPT | Performed by: ADVANCED PRACTICE MIDWIFE

## 2022-08-26 PROCEDURE — 3079F DIAST BP 80-89 MM HG: CPT | Mod: CPTII,,, | Performed by: ADVANCED PRACTICE MIDWIFE

## 2022-08-26 PROCEDURE — 1159F MED LIST DOCD IN RCRD: CPT | Mod: CPTII,,, | Performed by: ADVANCED PRACTICE MIDWIFE

## 2022-08-26 PROCEDURE — 36415 COLL VENOUS BLD VENIPUNCTURE: CPT | Mod: PN | Performed by: ADVANCED PRACTICE MIDWIFE

## 2022-08-26 PROCEDURE — 99214 PR OFFICE/OUTPT VISIT, EST, LEVL IV, 30-39 MIN: ICD-10-PCS | Mod: TH,S$PBB,, | Performed by: ADVANCED PRACTICE MIDWIFE

## 2022-08-26 PROCEDURE — 3008F BODY MASS INDEX DOCD: CPT | Mod: CPTII,,, | Performed by: ADVANCED PRACTICE MIDWIFE

## 2022-08-26 PROCEDURE — 99213 OFFICE O/P EST LOW 20 MIN: CPT | Mod: PBBFAC,TH,PN,25 | Performed by: ADVANCED PRACTICE MIDWIFE

## 2022-08-26 PROCEDURE — 86762 RUBELLA ANTIBODY: CPT | Performed by: ADVANCED PRACTICE MIDWIFE

## 2022-08-26 PROCEDURE — 84144 ASSAY OF PROGESTERONE: CPT | Performed by: ADVANCED PRACTICE MIDWIFE

## 2022-08-26 PROCEDURE — 3008F PR BODY MASS INDEX (BMI) DOCUMENTED: ICD-10-PCS | Mod: CPTII,,, | Performed by: ADVANCED PRACTICE MIDWIFE

## 2022-08-26 PROCEDURE — 87389 HIV-1 AG W/HIV-1&-2 AB AG IA: CPT | Performed by: ADVANCED PRACTICE MIDWIFE

## 2022-08-26 PROCEDURE — 87591 N.GONORRHOEAE DNA AMP PROB: CPT | Performed by: ADVANCED PRACTICE MIDWIFE

## 2022-08-26 RX ORDER — PROMETHAZINE HYDROCHLORIDE 12.5 MG/1
12.5 TABLET ORAL 4 TIMES DAILY
Qty: 30 TABLET | Refills: 2 | Status: SHIPPED | OUTPATIENT
Start: 2022-08-26 | End: 2023-01-25

## 2022-08-26 RX ORDER — ONDANSETRON 4 MG/1
4 TABLET, FILM COATED ORAL DAILY PRN
Qty: 30 TABLET | Refills: 1 | Status: SHIPPED | OUTPATIENT
Start: 2022-08-26 | End: 2023-01-25

## 2022-08-26 RX ORDER — ALBUTEROL SULFATE 90 UG/1
2 AEROSOL, METERED RESPIRATORY (INHALATION) EVERY 6 HOURS PRN
COMMUNITY
Start: 2022-05-17

## 2022-08-26 NOTE — PATIENT INSTRUCTIONS
Patient Education       Pregnancy - The Second Month   About this topic   It is important for you to learn how to take care of yourself to help you have a healthy baby and safe delivery. It is good to have health care throughout your pregnancy.  The second month of your pregnancy starts around week 5 and lasts through week 9. By knowing how far along you are, you can learn what is normal for this stage of your pregnancy and plan for what is next.  General   Your Body   During the second month of your pregnancy, here are some things you can expect.  There are more hormones active in your body and because of them, you may:  Have morning sickness or have an upset stomach at other times throughout the day  Feel just a little tired or need long naps each day  Go to the bathroom more often, even at night  Have tender or swollen breasts  Feel more emotional  Have color changes in nipples and areola  Have a brownish color over your forehead, temples, cheeks, and/or lips. This is melasma.  Your babys growth and development:  Your baby starts to develop organs like the brain, heart, liver, and lungs. Arms, legs, eyes, and ears are all starting to grow.  The heart beats about 150 times each minute by 6 weeks. Your doctor may be able to hear the heartbeat with a special tool by the end of this month.  Your baby has facial features, moves, and wiggles, but you won't be able to feel anything yet.  Your baby is about 1 inch (2.5 cm) long and is about the size of a blueberry.  Things to Think About   Avoid alcohol, drugs, tobacco products, and second hand smoke  Check with your doctor before taking any kind of drugs.  Ask about taking a vitamin. Take at least 400 micrograms of folic acid each day to help prevent some birth defects.  Making appointments with the doctor who will take care of you and your baby while you are pregnant.  Learn about what kinds of screening tests are offered while you are pregnant.  Is there anything I  need to do more or less of while I am pregnant?  Learn about which over the counter products are safe to use and take while you are pregnant.  Be sure to eat fresh, healthy foods while you are pregnant. Learn what foods are more likely to make you sick or have things that could harm your baby.  When do I need to call the doctor?   Not able to stop throwing up  Belly pain or cramps that keeps you from eating or sleeping  Period-like bleeding  Where can I learn more?   American Academy of Family Physicians  https://familydoctor.org/changes-in-your-body-during-pregnancy-first-trimester/   American Pregnancy Association  https://americanpregnancy.org/planning/first-prenatal-visit/   Last Reviewed Date   2020-04-20  Consumer Information Use and Disclaimer   This information is not specific medical advice and does not replace information you receive from your health care provider. This is only a brief summary of general information. It does NOT include all information about conditions, illnesses, injuries, tests, procedures, treatments, therapies, discharge instructions or life-style choices that may apply to you. You must talk with your health care provider for complete information about your health and treatment options. This information should not be used to decide whether or not to accept your health care providers advice, instructions or recommendations. Only your health care provider has the knowledge and training to provide advice that is right for you.  Copyright   Copyright © 2021 [employer] and its affiliates and/or licensors. All rights reserved.

## 2022-08-26 NOTE — PROGRESS NOTES
"CC: Absence of menses risk assessment Kerrie Neff     Pranay Piña is a 37 y.o. female  presents with complaint of absence of menstruation.  She reports nausea/vomIting  UPT is positive.      LMP 2022, EDC 2023, therefore 7 weeks 5 days    Past Medical History:   Diagnosis Date    Abnormal Pap smear 2005    colposcopy    Abnormal Pap smear of cervix         Asthma affecting pregnancy, antepartum 3/12/2018    Miscarriage      Past Surgical History:   Procedure Laterality Date    Right Knee       Social History     Socioeconomic History    Marital status:    Tobacco Use    Smoking status: Never Smoker    Smokeless tobacco: Never Used   Substance and Sexual Activity    Alcohol use: No    Drug use: No    Sexual activity: Yes     Partners: Male     Birth control/protection: None     Family History   Problem Relation Age of Onset    Breast cancer Neg Hx     Colon cancer Neg Hx     Ovarian cancer Neg Hx     Stroke Neg Hx     Thrombosis Neg Hx      OB History    Para Term  AB Living   8 4 4 0 4 4   SAB IAB Ectopic Multiple Live Births   4 0 0 0 4      # Outcome Date GA Lbr Drake/2nd Weight Sex Delivery Anes PTL Lv   8 SAB 05/10/20           7 Term 18 39w6d / 00:05 3.47 kg (7 lb 10.4 oz) F Vag-Spont EPI N NOAM   6 Term 02/10/15 40w0d  3.912 kg (8 lb 10 oz) F Vag-Spont EPI N NOAM   5 Term 03 40w0d 01:00 3.969 kg (8 lb 12 oz) F Vag-Spont None N NOAM   4 Term 99 40w0d 12:00 4.252 kg (9 lb 6 oz) M Vag-Spont None N NOAM   3 SAB      SAB      2 SAB            1 SAB                /82   Ht 5' 9" (1.753 m)   Wt 127.6 kg (281 lb 3.2 oz)   LMP 2022   Breastfeeding Unknown   BMI 41.53 kg/m²         ROS:  GENERAL: Denies weight gain or weight loss. Feeling well overall.   SKIN: Denies rash or lesions.   HEAD: Denies head injury or headache.   NODES: Denies enlarged lymph nodes.   CHEST: Denies chest pain or shortness of " breath.   CARDIOVASCULAR: Denies palpitations or left sided chest pain.   ABDOMEN: No abdominal pain, constipation, diarrhea, nausea, vomiting or rectal bleeding.   URINARY: No frequency, dysuria, hematuria, or burning on urination.  REPRODUCTIVE: See HPI.   BREASTS: The patient performs breast self-examination and denies pain, lumps, or nipple discharge.   HEMATOLOGIC: No easy bruisability or excessive bleeding.   MUSCULOSKELETAL: Denies joint pain or swelling.   NEUROLOGIC: Denies syncope or weakness.   PSYCHIATRIC: Denies depression, anxiety or mood swings.    PE:   APPEARANCE: Well nourished, well developed, in no acute distress.  AFFECT: WNL, alert and oriented x 3.  SKIN: No acne or hirsutism.  NECK: Neck symmetric without masses or thyromegaly.  NODES: No inguinal, cervical, axillary or femoral lymph node enlargement.  CHEST: Good respiratory effort.   ABDOMEN: Soft. No tenderness or masses. No hepatosplenomegaly. No hernias.  BREASTS: Symmetrical, no skin changes or visible lesions. No palpable masses, nipple discharge bilaterally.  PELVIC: Normal external female genitalia without lesions. Normal hair distribution. Adequate perineal body, normal urethral meatus. Vagina moist and well rugated without lesions or discharge. Cervix pink, without lesions, discharge or tenderness. No significant cystocele or rectocele. Bimanual exam shows uterus is difficult to determine secondary to habitus but approximately 8 weeks, regular, mobile and nontender. Adnexa without masses or tenderness.  EXTREMITIES: No edema.          ASSESSMENT and PLAN:    37-year-old  9 para 4 with secondary amenorrhea and positive UPT.    LMP is 2022 yielding EDC 2023, therefore 7 weeks 5 days.    Prenatal lab today.   Add progesterone secondary to OB history  GC chlamydia today.    Will defer Pap.    History of missed A/B at 10 weeks 5 days in January of this year managed with Cytotec.    Ultrasound today viability and  dating -single viable intrauterine pregnancy 8 weeks 1 day yielding EDC 04/06/2023.  Fetal heart tones 171 beats minute-reassuring  Complaining of nausea vomiting, prescription for Zofran and Phenergan is sent.    Continue with prenatal vitamins.    Miscarriage precautions reviewed.    First trimester expectations-information provided.    Return to the office 4 weeks or p.r.n. problems       Information for review  -      Based on A.C.S. Pap guidelines, recommendation yearly pelvic exams, mammograms and monthly self breast exams; to see her PCP for other health maintenance and pregnancy.   -      Patient's medications and medical history reviewed with patient and implications in pregnancy.   -      Pregnancy course, visits every 4 weeks until 28 weeks, every 2 weeks until 36 weeks and weekly until delivery. Ultra sound for dates in 1st trimester, anatomy ultrasound at 20-22 weeks and growth ulrasound at 36 weeks. This could be changed according to other pregnancy developments.  .Proper weight gain based on the [place] of Medicine's recommendations based on her pre-pregnancy weight.   Foods to avoid in pregnancy (i.e. sushi, fish that are high in mercury, deli meat, and unpasteurized cheeses).   Advise prenatal vitamin options (i.e. stool softener, DHA).   Discussed potential medical problems in pregnancy.  -     Oriented to practice including CNM collaboration.

## 2022-08-27 LAB
C TRACH DNA SPEC QL NAA+PROBE: NOT DETECTED
N GONORRHOEA DNA SPEC QL NAA+PROBE: NOT DETECTED
RPR SER QL: NORMAL

## 2022-08-29 LAB
HAV IGM SERPL QL IA: NEGATIVE
HBV CORE IGM SERPL QL IA: NEGATIVE
HBV SURFACE AG SERPL QL IA: NEGATIVE
HCV AB SERPL QL IA: NEGATIVE
HIV 1+2 AB+HIV1 P24 AG SERPL QL IA: NEGATIVE
RUBV IGG SER-ACNC: 10.8 IU/ML
RUBV IGG SER-IMP: REACTIVE

## 2022-08-29 RX ORDER — PROGESTERONE 200 MG/1
200 CAPSULE ORAL NIGHTLY
Qty: 45 CAPSULE | Refills: 0 | Status: SHIPPED | OUTPATIENT
Start: 2022-08-29 | End: 2023-01-25

## 2022-08-29 NOTE — PROGRESS NOTES
Called patient with progesterone level of 17.6.  After discussion it was determined that we would initiate progesterone 200 mg orally q.h.s..  Happy with this plan of care

## 2022-08-30 ENCOUNTER — PATIENT MESSAGE (OUTPATIENT)
Dept: OBSTETRICS AND GYNECOLOGY | Facility: CLINIC | Age: 37
End: 2022-08-30
Payer: MEDICAID

## 2022-09-01 ENCOUNTER — PATIENT MESSAGE (OUTPATIENT)
Dept: OBSTETRICS AND GYNECOLOGY | Facility: CLINIC | Age: 37
End: 2022-09-01
Payer: MEDICAID

## 2022-09-20 ENCOUNTER — INITIAL PRENATAL (OUTPATIENT)
Dept: OBSTETRICS AND GYNECOLOGY | Facility: CLINIC | Age: 37
End: 2022-09-20
Payer: MEDICAID

## 2022-09-20 VITALS
WEIGHT: 284.81 LBS | DIASTOLIC BLOOD PRESSURE: 74 MMHG | SYSTOLIC BLOOD PRESSURE: 122 MMHG | BODY MASS INDEX: 42.06 KG/M2

## 2022-09-20 DIAGNOSIS — O09.299 HISTORY OF MISCARRIAGE, CURRENTLY PREGNANT: ICD-10-CM

## 2022-09-20 DIAGNOSIS — O09.529 ANTEPARTUM MULTIGRAVIDA OF ADVANCED MATERNAL AGE: Primary | ICD-10-CM

## 2022-09-20 DIAGNOSIS — O09.40 GRAND MULTIPARITY, WITH CURRENT PREGNANCY, ANTEPARTUM: ICD-10-CM

## 2022-09-20 PROCEDURE — 99999 PR PBB SHADOW E&M-EST. PATIENT-LVL III: ICD-10-PCS | Mod: PBBFAC,,, | Performed by: ADVANCED PRACTICE MIDWIFE

## 2022-09-20 PROCEDURE — 99213 OFFICE O/P EST LOW 20 MIN: CPT | Mod: TH,S$PBB,, | Performed by: ADVANCED PRACTICE MIDWIFE

## 2022-09-20 PROCEDURE — 99213 OFFICE O/P EST LOW 20 MIN: CPT | Mod: PBBFAC,TH,PN | Performed by: ADVANCED PRACTICE MIDWIFE

## 2022-09-20 PROCEDURE — 99999 PR PBB SHADOW E&M-EST. PATIENT-LVL III: CPT | Mod: PBBFAC,,, | Performed by: ADVANCED PRACTICE MIDWIFE

## 2022-09-20 PROCEDURE — 99213 PR OFFICE/OUTPT VISIT, EST, LEVL III, 20-29 MIN: ICD-10-PCS | Mod: TH,S$PBB,, | Performed by: ADVANCED PRACTICE MIDWIFE

## 2022-09-20 NOTE — PROGRESS NOTES
37 y.o. female  at 11w6d   denies VB or cramping  New OB and consent signed  Doing well without concerns   First trimester s/s challenging at times secondary to taking progesterone 200 mg Phenergan mildly helpful.  Plan is to discontinue progesterone at approximately 14 weeks.  Reassured:       Reviewed prenatal labs  Genetic testing desires maternity 21-paperwork completed and will schedule at the [place] at patient's convenience      Reviewed warning signs, pregnancy precautions and how/when to call.  RTC x 4 wks, call or present sooner prn.     I spent a total of 20 minutes on the day of the visit.This includes face to face time and non-face to face time preparing to see the patient (eg, review of tests), Obtaining and/or reviewing separately obtained history, Documenting clinical information in the electronic or other health record, Independently interpreting resultsand communicating results to the patient/family/caregiver, or Care coordination.           CC: Absence of menses risk assessment Kerrie Neff     Pranay Malik is a 37 y.o. female  presents with complaint of absence of menstruation.  She reports nausea/vomIting  UPT is positive.      LMP 2022, EDC 2023, therefore 7 weeks 5 days    Past Medical History:   Diagnosis Date    Abnormal Pap smear 2005    colposcopy    Abnormal Pap smear of cervix         Asthma affecting pregnancy, antepartum 3/12/2018    Miscarriage      Past Surgical History:   Procedure Laterality Date    Right Knee       Social History     Socioeconomic History    Marital status:    Tobacco Use    Smoking status: Never    Smokeless tobacco: Never   Substance and Sexual Activity    Alcohol use: No    Drug use: No    Sexual activity: Yes     Partners: Male     Birth control/protection: None     Family History   Problem Relation Age of Onset    Breast cancer Neg Hx     Colon cancer Neg Hx     Ovarian cancer Neg Hx      Stroke Neg Hx     Thrombosis Neg Hx      OB History    Para Term  AB Living   9 4 4 0 4 4   SAB IAB Ectopic Multiple Live Births   4 0 0 0 4      # Outcome Date GA Lbr Drake/2nd Weight Sex Delivery Anes PTL Lv   9 Current            8 SAB 05/10/20           7 Term 18 39w6d / 00:05 3.47 kg (7 lb 10.4 oz) F Vag-Spont EPI N NOAM   6 Term 02/10/15 40w0d  3.912 kg (8 lb 10 oz) F Vag-Spont EPI N NOAM   5 Term 03 40w0d 01:00 3.969 kg (8 lb 12 oz) F Vag-Spont None N NOAM   4 Term 99 40w0d 12:00 4.252 kg (9 lb 6 oz) M Vag-Spont None N NOAM   3 SAB      SAB      2 SAB            1 SAB                /74   Wt 129.2 kg (284 lb 13.4 oz)   LMP 2022   BMI 42.06 kg/m²         ROS:  GENERAL: Denies weight gain or weight loss. Feeling well overall.   SKIN: Denies rash or lesions.   HEAD: Denies head injury or headache.   NODES: Denies enlarged lymph nodes.   CHEST: Denies chest pain or shortness of breath.   CARDIOVASCULAR: Denies palpitations or left sided chest pain.   ABDOMEN: No abdominal pain, constipation, diarrhea, nausea, vomiting or rectal bleeding.   URINARY: No frequency, dysuria, hematuria, or burning on urination.  REPRODUCTIVE: See HPI.   BREASTS: The patient performs breast self-examination and denies pain, lumps, or nipple discharge.   HEMATOLOGIC: No easy bruisability or excessive bleeding.   MUSCULOSKELETAL: Denies joint pain or swelling.   NEUROLOGIC: Denies syncope or weakness.   PSYCHIATRIC: Denies depression, anxiety or mood swings.    PE:   APPEARANCE: Well nourished, well developed, in no acute distress.  AFFECT: WNL, alert and oriented x 3.  SKIN: No acne or hirsutism.  NECK: Neck symmetric without masses or thyromegaly.  NODES: No inguinal, cervical, axillary or femoral lymph node enlargement.  CHEST: Good respiratory effort.   ABDOMEN: Soft. No tenderness or masses. No hepatosplenomegaly. No hernias.  BREASTS: Symmetrical, no skin changes or visible lesions. No  palpable masses, nipple discharge bilaterally.  PELVIC: Normal external female genitalia without lesions. Normal hair distribution. Adequate perineal body, normal urethral meatus. Vagina moist and well rugated without lesions or discharge. Cervix pink, without lesions, discharge or tenderness. No significant cystocele or rectocele. Bimanual exam shows uterus is difficult to determine secondary to habitus but approximately 8 weeks, regular, mobile and nontender. Adnexa without masses or tenderness.  EXTREMITIES: No edema.          ASSESSMENT and PLAN:    37-year-old  9 para 4 with secondary amenorrhea and positive UPT.    LMP is 2022 yielding EDC 2023, therefore 7 weeks 5 days.    Prenatal lab today.   Add progesterone secondary to OB history  GC chlamydia today.    Will defer Pap.    History of missed A/B at 10 weeks 5 days in January of this year managed with Cytotec.    Ultrasound today viability and dating -single viable intrauterine pregnancy 8 weeks 1 day yielding EDC 2023.  Fetal heart tones 171 beats minute-reassuring  Complaining of nausea vomiting, prescription for Zofran and Phenergan is sent.    Continue with prenatal vitamins.    Miscarriage precautions reviewed.    First trimester expectations-information provided.    Return to the office 4 weeks or p.r.n. problems       Information for review  -      Based on A.C.S. Pap guidelines, recommendation yearly pelvic exams, mammograms and monthly self breast exams; to see her PCP for other health maintenance and pregnancy.   -      Patient's medications and medical history reviewed with patient and implications in pregnancy.   -      Pregnancy course, visits every 4 weeks until 28 weeks, every 2 weeks until 36 weeks and weekly until delivery. Ultra sound for dates in 1st trimester, anatomy ultrasound at 20-22 weeks and growth ulrasound at 36 weeks. This could be changed according to other pregnancy developments.  .Proper weight gain  based on the [employer]'s recommendations based on her pre-pregnancy weight.   Foods to avoid in pregnancy (i.e. sushi, fish that are high in mercury, deli meat, and unpasteurized cheeses).   Advise prenatal vitamin options (i.e. stool softener, DHA).   Discussed potential medical problems in pregnancy.  -     Oriented to practice including CNM collaboration.

## 2022-09-20 NOTE — PATIENT INSTRUCTIONS
Patient Education       Pregnancy - The Third Month   About this topic   It is important for you to learn how to take care of yourself to help you have a healthy baby and safe delivery. It is good to have health care throughout your pregnancy.  The third month of your pregnancy starts around week 10 and lasts through week 13. By knowing how far along you are, you can learn what is normal for this stage of your pregnancy and plan for what is next.  General   Your Body   During the third month of your pregnancy, here are some things you can expect.  You may:  Have less morning sickness or upset stomach. This is because the placenta has taken over some of the hormone production for the baby.  Start to gain weight. It is normal to gain about 1 to 3 pounds (.5 to 1.5 kg) in your first 3 months. Most moms gain about 25 to 35 pounds (11 to 16 kg) during their pregnancy. Talk to your doctor about how much weight you should gain.  Have glowing skin because of extra blood flow and hormones  Notice a dark line on your belly. This is normal. You may also be able to feel your uterus in your belly as it starts to grow.  Have more trouble sleeping at night  Have an increase in appetite  Have trouble breathing or have an increase in congestion  Have trouble with bowel movements  Be at a higher risk for getting a yeast infection because of the change in pH of your vagina  Have frequent mood swings.  Your babys growth and development:  Your baby's organs are formed and are starting to work together. Eyelids are closed and will stay that way to protect their eyes as they grow.  Their bones are growing. Your baby is able to open and close their mouth and starts to suck their thumb.  Your baby's genitals and reproductive organs are developing, but it is too soon to tell if your baby is a boy or girl with an ultrasound.  The heartbeat is easy to hear with a special tool at the doctors office.  Your baby is about 3 inches (8 cm) long  and weighs about 1 ounce (30 gm). Your baby is about the size of a lemon.  Things to Think About   Avoid alcohol, drugs, tobacco products, and second hand smoke  Check with your doctor before taking any kind of drugs. Continue to take your vitamin with folic acid.  Eat small meals and drink more water to help with heartburn. Also go for a walk after eating and avoid spicy, fried foods.  You may need to switch to another size or style of clothes as your baby grows. Be comfortable and know that the baby is well cushioned inside of you.  Stay healthy by eating good foods, exercising, and getting enough rest.  When do I need to call the doctor?   Not gaining any weight or losing weight  Belly pain or cramps that keeps you from eating or sleeping  Continuing to have too much upset stomach and throwing up  Period-like bleeding  Where can I learn more?   American Academy of Family Physicians  https://familydoctor.org/changes-in-your-body-during-pregnancy-first-trimester/   Better Health  https://www.betterhealth.geoff.gov.au/health/HealthyLiving/pregnancy-stages-and-changes   Last Reviewed Date   2020-04-20  Consumer Information Use and Disclaimer   This information is not specific medical advice and does not replace information you receive from your health care provider. This is only a brief summary of general information. It does NOT include all information about conditions, illnesses, injuries, tests, procedures, treatments, therapies, discharge instructions or life-style choices that may apply to you. You must talk with your health care provider for complete information about your health and treatment options. This information should not be used to decide whether or not to accept your health care providers advice, instructions or recommendations. Only your health care provider has the knowledge and training to provide advice that is right for you.  Copyright   Copyright © 2021 [employer] and its affiliates and/or  licensors. All rights reserved.

## 2022-09-24 ENCOUNTER — PATIENT MESSAGE (OUTPATIENT)
Dept: ADMINISTRATIVE | Facility: OTHER | Age: 37
End: 2022-09-24
Payer: MEDICAID

## 2022-09-26 ENCOUNTER — PATIENT MESSAGE (OUTPATIENT)
Dept: OBSTETRICS AND GYNECOLOGY | Facility: CLINIC | Age: 37
End: 2022-09-26
Payer: MEDICAID

## 2022-09-29 ENCOUNTER — LAB VISIT (OUTPATIENT)
Dept: LAB | Facility: HOSPITAL | Age: 37
End: 2022-09-29
Attending: ADVANCED PRACTICE MIDWIFE
Payer: MEDICAID

## 2022-09-29 DIAGNOSIS — O09.529 ANTEPARTUM MULTIGRAVIDA OF ADVANCED MATERNAL AGE: ICD-10-CM

## 2022-09-29 PROCEDURE — 36415 COLL VENOUS BLD VENIPUNCTURE: CPT | Performed by: ADVANCED PRACTICE MIDWIFE

## 2022-10-04 ENCOUNTER — TELEPHONE (OUTPATIENT)
Dept: OBSTETRICS AND GYNECOLOGY | Facility: CLINIC | Age: 37
End: 2022-10-04
Payer: MEDICAID

## 2022-10-04 NOTE — TELEPHONE ENCOUNTER
----- Message from Elena Avila sent at 10/4/2022 11:11 AM CDT -----  Felicia with [facility] called regarding a sample but there is no test marked on the requisition. Please call with the test needed and fax a copy of test form with test marked, dated and initialed. She is requesting it be emailed at [email or web address] and call back is [phone], fax is [phone]. Thx. EL

## 2022-10-19 ENCOUNTER — ROUTINE PRENATAL (OUTPATIENT)
Dept: OBSTETRICS AND GYNECOLOGY | Facility: CLINIC | Age: 37
End: 2022-10-19
Payer: MEDICAID

## 2022-10-19 VITALS — SYSTOLIC BLOOD PRESSURE: 118 MMHG | BODY MASS INDEX: 42.45 KG/M2 | WEIGHT: 287.5 LBS | DIASTOLIC BLOOD PRESSURE: 70 MMHG

## 2022-10-19 DIAGNOSIS — O09.40 GRAND MULTIPARITY, WITH CURRENT PREGNANCY, ANTEPARTUM: ICD-10-CM

## 2022-10-19 DIAGNOSIS — O09.529 ANTEPARTUM MULTIGRAVIDA OF ADVANCED MATERNAL AGE: Primary | ICD-10-CM

## 2022-10-19 PROCEDURE — 99213 OFFICE O/P EST LOW 20 MIN: CPT | Mod: TH,S$PBB,, | Performed by: ADVANCED PRACTICE MIDWIFE

## 2022-10-19 PROCEDURE — 99999 PR PBB SHADOW E&M-EST. PATIENT-LVL III: CPT | Mod: PBBFAC,,, | Performed by: ADVANCED PRACTICE MIDWIFE

## 2022-10-19 PROCEDURE — 99213 OFFICE O/P EST LOW 20 MIN: CPT | Mod: PBBFAC,TH,PN | Performed by: ADVANCED PRACTICE MIDWIFE

## 2022-10-19 PROCEDURE — 99999 PR PBB SHADOW E&M-EST. PATIENT-LVL III: ICD-10-PCS | Mod: PBBFAC,,, | Performed by: ADVANCED PRACTICE MIDWIFE

## 2022-10-19 PROCEDURE — 99213 PR OFFICE/OUTPT VISIT, EST, LEVL III, 20-29 MIN: ICD-10-PCS | Mod: TH,S$PBB,, | Performed by: ADVANCED PRACTICE MIDWIFE

## 2022-10-19 NOTE — PROGRESS NOTES
37 y.o. female  at 16w0d   Is feeling flutters, denies VB, LOF or cramping    Doing well without concerns     TWG: 3 lbs   Reviewed prenatal labs  Genetic testing maternity 21-negative girl  Anatomy scan ordered    Reviewed warning signs, pregnancy precautions and how/when to call.  RTC x 4 with anatomy ultrasound wks, call or present sooner prn.     I spent a total of 20 minutes on the day of the visit.This includes face to face time and non-face to face time preparing to see the patient (eg, review of tests), Obtaining and/or reviewing separately obtained history, Documenting clinical information in the electronic or other health record, Independently interpreting resultsand communicating results to the patient/family/caregiver, or Care coordination.

## 2022-11-16 ENCOUNTER — APPOINTMENT (OUTPATIENT)
Dept: OBSTETRICS AND GYNECOLOGY | Facility: CLINIC | Age: 37
End: 2022-11-16
Payer: MEDICAID

## 2022-11-16 ENCOUNTER — ROUTINE PRENATAL (OUTPATIENT)
Dept: OBSTETRICS AND GYNECOLOGY | Facility: CLINIC | Age: 37
End: 2022-11-16
Payer: MEDICAID

## 2022-11-16 VITALS — SYSTOLIC BLOOD PRESSURE: 118 MMHG | DIASTOLIC BLOOD PRESSURE: 78 MMHG | BODY MASS INDEX: 42.16 KG/M2 | WEIGHT: 285.5 LBS

## 2022-11-16 DIAGNOSIS — O99.210 OBESITY AFFECTING PREGNANCY, ANTEPARTUM: Primary | ICD-10-CM

## 2022-11-16 DIAGNOSIS — O09.529 ANTEPARTUM MULTIGRAVIDA OF ADVANCED MATERNAL AGE: ICD-10-CM

## 2022-11-16 DIAGNOSIS — O09.40 GRAND MULTIPARITY, WITH CURRENT PREGNANCY, ANTEPARTUM: ICD-10-CM

## 2022-11-16 DIAGNOSIS — O09.299 HISTORY OF MISCARRIAGE, CURRENTLY PREGNANT: ICD-10-CM

## 2022-11-16 DIAGNOSIS — Z36.2 ENCOUNTER FOR FOLLOW-UP ULTRASOUND OF FETAL ANATOMY: Primary | ICD-10-CM

## 2022-11-16 PROCEDURE — 76805 OB US >/= 14 WKS SNGL FETUS: CPT | Mod: PBBFAC,PN | Performed by: OBSTETRICS & GYNECOLOGY

## 2022-11-16 PROCEDURE — 76805 US OB/GYN PROCEDURE (VIEWPOINT): ICD-10-PCS | Mod: 26,S$PBB,, | Performed by: OBSTETRICS & GYNECOLOGY

## 2022-11-16 PROCEDURE — 99999 PR PBB SHADOW E&M-EST. PATIENT-LVL III: CPT | Mod: PBBFAC,,, | Performed by: ADVANCED PRACTICE MIDWIFE

## 2022-11-16 PROCEDURE — 99999 PR PBB SHADOW E&M-EST. PATIENT-LVL III: ICD-10-PCS | Mod: PBBFAC,,, | Performed by: ADVANCED PRACTICE MIDWIFE

## 2022-11-16 PROCEDURE — 99213 PR OFFICE/OUTPT VISIT, EST, LEVL III, 20-29 MIN: ICD-10-PCS | Mod: TH,S$PBB,, | Performed by: ADVANCED PRACTICE MIDWIFE

## 2022-11-16 PROCEDURE — 99213 OFFICE O/P EST LOW 20 MIN: CPT | Mod: PBBFAC,TH,PN | Performed by: ADVANCED PRACTICE MIDWIFE

## 2022-11-16 PROCEDURE — 99213 OFFICE O/P EST LOW 20 MIN: CPT | Mod: TH,S$PBB,, | Performed by: ADVANCED PRACTICE MIDWIFE

## 2022-11-16 NOTE — PATIENT INSTRUCTIONS
Patient Education       Pregnancy - The Fourth Month   About this topic   It is important for you to learn how to take care of yourself to help you have a healthy baby and safe delivery. It is good to have health care throughout your pregnancy.  The fourth month of your pregnancy starts around week 14 and lasts through week 18. By knowing how far along you are, you can learn what is normal for this stage of your pregnancy and plan for what is next.  General   Growth and Development   During the fourth month of your pregnancy, here are some things you can expect.  You may:  Start to show that you are pregnant. It is normal to gain about 5 to 10 pounds (2.3 to 4.5 kg) total in your first 4 months.  Have heartburn  Feel like you have trouble paying attention to things  Have less nausea  Notice your breasts are growing and the veins are easier to see on them  Have swollen veins in your legs and feet, more nosebleeds, or bleeding when you brush your teeth. These are all because of the extra blood your body has while you are pregnant.  Notice more swelling in your hands and feet  Start to feel fluttering when you are lying or sitting quietly. This is your baby kicking.  Have pain in your sides with sudden movement. This is normal and happens because the ligaments in your belly are stretching.  Have a little more energy. Exercise is good for you, but check with your doctor before starting new exercises.  Most of the time it is safe for you to have sex while you are pregnant. It wont hurt the baby.  Your babys:  Skin is very thin and you can easily see blood vessels through it. Your baby is covered with lots of fine hair to protect their skin.  Bones are starting to harden. Your baby is able to frown, smile, stretch, and move.  Practicing breathing movements while inside of your womb  About 6 inches (16 cm) long and weighs about 7 ounces (200 gm). Your baby is about the size of an orange.  Things to Think About   Avoid  alcohol, drugs, tobacco products, and second hand smoke  Check with your doctor before taking any kind of drugs. Continue to take your vitamin with folic acid.  Avoid cleaning cat litter boxes. This can cause a disease that causes birth defects in your baby.  Amniocentesis and other prenatal screening tests may be done this month.  Try sleeping on your side. Use a pillow between your legs. Avoid sleeping on your back. This will help with the blood flow to your baby.  Change positions and get up slowly. Your heart has to work hard to cope with all of the extra blood volume.  Where will you take your baby for care after they are born? This is a good time to find a doctor for your baby.  Eat fresh fruits and foods with a lot of fiber to help with hard stools.  Drink at least 6 to 8 glasses of water each day.  When do I need to call the doctor?   Vaginal bleeding  Leaking of fluid from your vagina  Problems with constipation  Belly pain  Any illness or infection  Severe headaches or headaches that wont go away  Where can I learn more?   Better Health  https://www.betterhealth.geoff.gov.au/health/HealthyLiving/pregnancy-stages-and-changes   Family Doctor  https://familydoctor.org/changes-in-your-body-during-pregnancy-first-trimester/   Last Reviewed Date   2020-04-20  Consumer Information Use and Disclaimer   This information is not specific medical advice and does not replace information you receive from your health care provider. This is only a brief summary of general information. It does NOT include all information about conditions, illnesses, injuries, tests, procedures, treatments, therapies, discharge instructions or life-style choices that may apply to you. You must talk with your health care provider for complete information about your health and treatment options. This information should not be used to decide whether or not to accept your health care providers advice, instructions or recommendations. Only your  health care provider has the knowledge and training to provide advice that is right for you.  Copyright   Copyright © 2021 [employer] and its affiliates and/or licensors. All rights reserved.

## 2022-11-16 NOTE — PROGRESS NOTES
37 y.o. female  at 20w0d   Is feeling flutters/FM, denies VB, LOF or cramping  Doing well without concerns      AMA-maternity 21- and daily baby aspirin    TW lbs   Reviewed anatomy US-vertex, posterior placenta, three-vessel cord, normal fluid, estimated fetal weight is 12 oz normal anatomy with suboptimal views.  No obvious abnormalities noted will follow-up use at next visit  Genetic testing Mat 21 Neg girl  Reviewed warning signs, normal FM,  labor precautions and how/when to call.  RTC x 4 wks, with follow-up view ultrasound call or present sooner prn.     I spent a total of 20 minutes on the day of the visit.This includes face to face time and non-face to face time preparing to see the patient (eg, review of tests), Obtaining and/or reviewing separately obtained history, Documenting clinical information in the electronic or other health record, Independently interpreting resultsand communicating results to the patient/family/caregiver, or Care coordination.

## 2022-12-14 ENCOUNTER — ROUTINE PRENATAL (OUTPATIENT)
Dept: OBSTETRICS AND GYNECOLOGY | Facility: CLINIC | Age: 37
End: 2022-12-14
Payer: MEDICAID

## 2022-12-14 ENCOUNTER — PROCEDURE VISIT (OUTPATIENT)
Dept: OBSTETRICS AND GYNECOLOGY | Facility: CLINIC | Age: 37
End: 2022-12-14
Payer: MEDICAID

## 2022-12-14 VITALS — WEIGHT: 293 LBS | BODY MASS INDEX: 43.36 KG/M2 | DIASTOLIC BLOOD PRESSURE: 74 MMHG | SYSTOLIC BLOOD PRESSURE: 116 MMHG

## 2022-12-14 DIAGNOSIS — O09.40 GRAND MULTIPARITY, WITH CURRENT PREGNANCY, ANTEPARTUM: ICD-10-CM

## 2022-12-14 DIAGNOSIS — Z36.2 ENCOUNTER FOR FOLLOW-UP ULTRASOUND OF FETAL ANATOMY: ICD-10-CM

## 2022-12-14 DIAGNOSIS — O09.529 ANTEPARTUM MULTIGRAVIDA OF ADVANCED MATERNAL AGE: Primary | ICD-10-CM

## 2022-12-14 PROCEDURE — 99213 OFFICE O/P EST LOW 20 MIN: CPT | Mod: TH,S$PBB,, | Performed by: ADVANCED PRACTICE MIDWIFE

## 2022-12-14 PROCEDURE — 99999 PR PBB SHADOW E&M-EST. PATIENT-LVL III: CPT | Mod: PBBFAC,,, | Performed by: ADVANCED PRACTICE MIDWIFE

## 2022-12-14 PROCEDURE — 90471 IMMUNIZATION ADMIN: CPT | Mod: PBBFAC,PN

## 2022-12-14 PROCEDURE — 99213 PR OFFICE/OUTPT VISIT, EST, LEVL III, 20-29 MIN: ICD-10-PCS | Mod: TH,S$PBB,, | Performed by: ADVANCED PRACTICE MIDWIFE

## 2022-12-14 PROCEDURE — 99999 PR PBB SHADOW E&M-EST. PATIENT-LVL III: ICD-10-PCS | Mod: PBBFAC,,, | Performed by: ADVANCED PRACTICE MIDWIFE

## 2022-12-14 PROCEDURE — 76816 US OB/GYN PROCEDURE (VIEWPOINT): ICD-10-PCS | Mod: 26,S$PBB,, | Performed by: OBSTETRICS & GYNECOLOGY

## 2022-12-14 PROCEDURE — 76816 OB US FOLLOW-UP PER FETUS: CPT | Mod: PBBFAC,PN | Performed by: OBSTETRICS & GYNECOLOGY

## 2022-12-14 PROCEDURE — 99213 OFFICE O/P EST LOW 20 MIN: CPT | Mod: PBBFAC,TH,PN | Performed by: ADVANCED PRACTICE MIDWIFE

## 2022-12-14 NOTE — NURSING
After using two patient identifiers and reviewing allergies and medications. Pt. Received flu vaccine injection. Instructed to wait 15 minutes. Pt. Voiced understanding.

## 2022-12-14 NOTE — PATIENT INSTRUCTIONS
Patient Education       Pregnancy - The Fifth Month   About this topic   It is important for you to learn how to take care of yourself to help you have a healthy baby and safe delivery. It is good to have health care throughout your pregnancy.  The fifth month of your pregnancy starts around week 19 and lasts through week 23. By knowing how far along you are, you can learn what is normal for this stage of your pregnancy and plan for what is next.  General   Growth and Development   During the fifth month of your pregnancy, here are some things you can expect.  You may:  Start to gain a little more weight. It is normal to gain about 10 to 15 pounds (4.5 to 7 kg) total in your first 5 months.  Have leg cramps. Be sure to drink plenty of water.  Have loose teeth.  Have more trouble breathing or with heartburn as your baby gets bigger  Start having [place] contractions. You may feel your belly squeezing or getting tight. Be sure to drink 6 to 8 glasses of water each day.  Notice you are able to express milk from your breasts  See stretch marks on your belly, breasts, or legs. Stay active to try and keep good muscle tone.  Be checked for gestational diabetes  Your baby's growth and development:  Your baby is covered with a thick whitish substance called vernix. This helps protect your babys skin.  Their genitals are able to be seen on an ultrasound. Your doctor will check your babys size, how much fluid there is around your baby, and all of your babys organs with the ultrasound.  Your baby is starting to be able to see, hear, taste, and feel touch.  The bones are starting to make blood cells.  Your baby is about 11 inches (28 cm) long and weighs about 1 pound (450 gm). Your baby is about the size of a grapefruit.  Things to Think About   Avoid alcohol, drugs, tobacco products, and second hand smoke  Do not clean your cat litter box. You could get a disease that causes birth defects to your baby.  Check with your  doctor before taking any kind of drugs. Continue to take your vitamin with folic acid.  Do you plan to breastfeed your baby?  Where will you deliver? Do you plan to take prenatal classes? If so, try to have them completed by your 8th month.  Start to think about your plans for pain control during labor.  You may want to learn about cord blood banking.  Rest and take breaks each day.  When do I need to call the doctor?   Contractions every 10 minutes or more often that do not go away with drinking water or position changes  Low, dull back pain that does not go away  Pressure in your pelvis that feels like your baby is pushing down  A gush or constant trickle of watery or bloody fluid leaking from your vagina  Cramps in your lower belly that come and go or are constant  Little to no movement felt by baby in 2 hours. Your baby should move at least 10 times every 2 hours.  Headache that does not go away, blurry vision, seeing spots or halos, increase in swelling in your hands, feet, or face, and pain under your ribs on the right side  Fever of 100.4°F (38°C) or higher  Bleeding or swelling of your gums  Urinating less, or having pain when you urinate  Vaginal bleeding with or without pain  After a car accident, fall, or any trauma to your belly  Having thoughts of harming yourself or others, or do not feel safe at home  Where can I learn more?   American Academy of Family Physicians  https://familydoctor.org/changes-in-your-body-during-pregnancy-second-trimester/   Better Health  https://www.betterhealth.geoff.gov.au/health/HealthyLiving/pregnancy-stages-and-changes   Last Reviewed Date   2020-04-20  Consumer Information Use and Disclaimer   This information is not specific medical advice and does not replace information you receive from your health care provider. This is only a brief summary of general information. It does NOT include all information about conditions, illnesses, injuries, tests, procedures, treatments,  therapies, discharge instructions or life-style choices that may apply to you. You must talk with your health care provider for complete information about your health and treatment options. This information should not be used to decide whether or not to accept your health care providers advice, instructions or recommendations. Only your health care provider has the knowledge and training to provide advice that is right for you.  Copyright   Copyright © 2021 [employer] and its affiliates and/or licensors. All rights reserved.

## 2022-12-14 NOTE — PROGRESS NOTES
37 y.o. female  at 24w0d   Reports + FM, denies VB, LOF, or cramping  Doing well without concerns     Ultrasound to follow-up view-breech, MVP 5 cm, EFW 1 lb 8 oz at 55 percentile, head, neck, face, heart, thorax abdomen and spine are visualized and appear normal.  Reassuring    TW lbs     Flu shot today  Reviewed upcoming 28wk labs, (A POS) and orders placed, tdap handout provided and explained  Reviewed warning signs, normal FM,  labor precautions and how/when to call.  RTC x 2-4 wks, with 28 week labs call or present sooner prn.     I spent a total of 20 minutes on the day of the visit.This includes face to face time and non-face to face time preparing to see the patient (eg, review of tests), Obtaining and/or reviewing separately obtained history, Documenting clinical information in the electronic or other health record, Independently interpreting resultsand communicating results to the patient/family/caregiver, or Care coordination.

## 2022-12-21 ENCOUNTER — PATIENT MESSAGE (OUTPATIENT)
Dept: ADMINISTRATIVE | Facility: OTHER | Age: 37
End: 2022-12-21
Payer: MEDICAID

## 2022-12-28 ENCOUNTER — NURSE TRIAGE (OUTPATIENT)
Dept: ADMINISTRATIVE | Facility: CLINIC | Age: 37
End: 2022-12-28
Payer: MEDICAID

## 2022-12-28 ENCOUNTER — ROUTINE PRENATAL (OUTPATIENT)
Dept: OBSTETRICS AND GYNECOLOGY | Facility: CLINIC | Age: 37
End: 2022-12-28
Payer: MEDICAID

## 2022-12-28 ENCOUNTER — PATIENT MESSAGE (OUTPATIENT)
Dept: OBSTETRICS AND GYNECOLOGY | Facility: CLINIC | Age: 37
End: 2022-12-28

## 2022-12-28 ENCOUNTER — LAB VISIT (OUTPATIENT)
Dept: LAB | Facility: HOSPITAL | Age: 37
End: 2022-12-28
Attending: ADVANCED PRACTICE MIDWIFE
Payer: MEDICAID

## 2022-12-28 VITALS — DIASTOLIC BLOOD PRESSURE: 78 MMHG | BODY MASS INDEX: 44.18 KG/M2 | WEIGHT: 293 LBS | SYSTOLIC BLOOD PRESSURE: 120 MMHG

## 2022-12-28 DIAGNOSIS — O09.529 ANTEPARTUM MULTIGRAVIDA OF ADVANCED MATERNAL AGE: Primary | ICD-10-CM

## 2022-12-28 DIAGNOSIS — O09.529 ANTEPARTUM MULTIGRAVIDA OF ADVANCED MATERNAL AGE: ICD-10-CM

## 2022-12-28 DIAGNOSIS — O09.40 GRAND MULTIPARITY, WITH CURRENT PREGNANCY, ANTEPARTUM: ICD-10-CM

## 2022-12-28 LAB
BASOPHILS # BLD AUTO: 0.02 K/UL (ref 0–0.2)
BASOPHILS NFR BLD: 0.3 % (ref 0–1.9)
DIFFERENTIAL METHOD: ABNORMAL
EOSINOPHIL # BLD AUTO: 0.1 K/UL (ref 0–0.5)
EOSINOPHIL NFR BLD: 1.3 % (ref 0–8)
ERYTHROCYTE [DISTWIDTH] IN BLOOD BY AUTOMATED COUNT: 14.8 % (ref 11.5–14.5)
GLUCOSE SERPL-MCNC: 90 MG/DL (ref 70–140)
HCT VFR BLD AUTO: 38.5 % (ref 37–48.5)
HGB BLD-MCNC: 12.5 G/DL (ref 12–16)
HIV 1+2 AB+HIV1 P24 AG SERPL QL IA: NORMAL
IMM GRANULOCYTES # BLD AUTO: 0.02 K/UL (ref 0–0.04)
IMM GRANULOCYTES NFR BLD AUTO: 0.3 % (ref 0–0.5)
LYMPHOCYTES # BLD AUTO: 1.4 K/UL (ref 1–4.8)
LYMPHOCYTES NFR BLD: 22.3 % (ref 18–48)
MCH RBC QN AUTO: 32.6 PG (ref 27–31)
MCHC RBC AUTO-ENTMCNC: 32.5 G/DL (ref 32–36)
MCV RBC AUTO: 100 FL (ref 82–98)
MONOCYTES # BLD AUTO: 0.4 K/UL (ref 0.3–1)
MONOCYTES NFR BLD: 6.1 % (ref 4–15)
NEUTROPHILS # BLD AUTO: 4.2 K/UL (ref 1.8–7.7)
NEUTROPHILS NFR BLD: 69.7 % (ref 38–73)
NRBC BLD-RTO: 0 /100 WBC
PLATELET # BLD AUTO: 253 K/UL (ref 150–450)
PMV BLD AUTO: 11.2 FL (ref 9.2–12.9)
RBC # BLD AUTO: 3.84 M/UL (ref 4–5.4)
WBC # BLD AUTO: 6.05 K/UL (ref 3.9–12.7)

## 2022-12-28 PROCEDURE — 82950 GLUCOSE TEST: CPT | Performed by: ADVANCED PRACTICE MIDWIFE

## 2022-12-28 PROCEDURE — 99999 PR PBB SHADOW E&M-EST. PATIENT-LVL III: CPT | Mod: PBBFAC,,, | Performed by: ADVANCED PRACTICE MIDWIFE

## 2022-12-28 PROCEDURE — 85025 COMPLETE CBC W/AUTO DIFF WBC: CPT | Performed by: ADVANCED PRACTICE MIDWIFE

## 2022-12-28 PROCEDURE — 99999 PR PBB SHADOW E&M-EST. PATIENT-LVL III: ICD-10-PCS | Mod: PBBFAC,,, | Performed by: ADVANCED PRACTICE MIDWIFE

## 2022-12-28 PROCEDURE — 86592 SYPHILIS TEST NON-TREP QUAL: CPT | Performed by: ADVANCED PRACTICE MIDWIFE

## 2022-12-28 PROCEDURE — 99213 OFFICE O/P EST LOW 20 MIN: CPT | Mod: PBBFAC,TH,PN | Performed by: ADVANCED PRACTICE MIDWIFE

## 2022-12-28 PROCEDURE — 36415 COLL VENOUS BLD VENIPUNCTURE: CPT | Mod: PN | Performed by: ADVANCED PRACTICE MIDWIFE

## 2022-12-28 PROCEDURE — 99213 OFFICE O/P EST LOW 20 MIN: CPT | Mod: TH,S$PBB,, | Performed by: ADVANCED PRACTICE MIDWIFE

## 2022-12-28 PROCEDURE — 87389 HIV-1 AG W/HIV-1&-2 AB AG IA: CPT | Performed by: ADVANCED PRACTICE MIDWIFE

## 2022-12-28 PROCEDURE — 99213 PR OFFICE/OUTPT VISIT, EST, LEVL III, 20-29 MIN: ICD-10-PCS | Mod: TH,S$PBB,, | Performed by: ADVANCED PRACTICE MIDWIFE

## 2022-12-28 NOTE — PATIENT INSTRUCTIONS
Patient Education       Pregnancy - The Sixth Month   About this topic   It is important for you to learn how to take care of yourself to help you have a healthy baby and safe delivery. It is good to have health care throughout your pregnancy.  The sixth month of your pregnancy starts around week 24 and lasts through week 28. By knowing how far along you are, you can learn what is normal for this stage of your pregnancy and plan for what is next.  General   Your body   During the sixth month of your pregnancy, here are some things you can expect.  You may:  Start to gain a little more weight. It is normal to gain about 10 to 15 pounds (4.5 to 7 kg) total in your first 6 months.  Have problems like back pain, dry eyes, or aching hands and wrists  Itching of palms, abdomen, or soles of your feet  Swelling of ankles, fingers, or face.  Need to urinate more frequently.  Have problems with hemorrhoids. Be sure to eat plenty of fresh fruits and vegetables to increase the fiber in your diet. Drink plenty of water to help keep your stools soft.  Continue having Magoffin Wang contractions. You may feel your belly squeezing or getting tight.  Have a glucose test to test for gestational diabetes.  Have more headaches. Talk to your doctor about how to help with them.  See stretch marks on your belly, breasts, or legs. Stay active to try and keep good muscle tone.  See an increase in vaginal discharge. Talk to your doctor about what to expect.  Your baby's growth and development:  Your baby looks like a very small  baby.  They are able to live outside of your womb but would need a lot of help breathing, eating, and staying warm in an intensive care unit.  Your baby has times of being awake and times of being asleep. The babys eyelids are able to open and close.  They move more and have hiccups.  Your baby is about 14 inches (36 cm) long and weighs about 2 pounds (900 gm). Your baby is about the size of an eggplant.  Baby  may be able to hear voices.  Things to Think About   Avoid alcohol, drugs, tobacco products, and second hand smoke  Eat small meals throughout the day instead of larger meals.  Avoid spicy, greasy, or fatty foods.  Avoid lying down or bending over for around 3 hours after eating  Warm baths are fine to help you relax. Avoid hot tubs while you are pregnant.  Avoid straining when having bowel movements.  Learning how to care for your baby. You may want to sign up for classes on how to take care of a .  Are you planning on going back to work after having your baby? Its not too early to think about .  Consider starting your birth plan if you have specific needs or wishes for delivery.  When do I need to call the doctor?   Contractions every 10 minutes or more often that do not go away with drinking water or position changes  Low, dull back pain that does not go away  Pressure in your pelvis that feels like your baby is pushing down  A gush or constant trickle of watery or bloody fluid leaking from your vagina  Cramps in your lower belly that come and go or are constant  Change in your baby's movement  Fever of 100.4°F (38°C) or higher  Little to no movement felt by baby in 2 hours. Your baby should be moving at least 10 times every 2 hours.  Headache that does not go away; blurry vision; seeing spots or halos; increase in swelling in your hands, feet, or face; and pain under your ribs on the right side  Vaginal bleeding with or without pain  After a car accident, fall, or any trauma to your belly  Having thoughts of harming yourself or others, or do not feel safe at home  Where can I learn more?   American Academy of Family Physicians  https://familydoctor.org/changes-in-your-body-during-pregnancy-second-trimester/   KidsHealth  http://kidshealth.org/en/parents/pregnancy-calendar-intro.html?WT.ac=p-barbie   Office of Womens  Health  https://www.womenshealth.gov/pregnancy/youre-pregnant-now-what/stages-pregnancy   Last Reviewed Date   2020-05-06  Consumer Information Use and Disclaimer   This information is not specific medical advice and does not replace information you receive from your health care provider. This is only a brief summary of general information. It does NOT include all information about conditions, illnesses, injuries, tests, procedures, treatments, therapies, discharge instructions or life-style choices that may apply to you. You must talk with your health care provider for complete information about your health and treatment options. This information should not be used to decide whether or not to accept your health care providers advice, instructions or recommendations. Only your health care provider has the knowledge and training to provide advice that is right for you.  Copyright   Copyright © 2021 [employer]. and its affiliates and/or licensors. All rights reserved.

## 2022-12-28 NOTE — PROGRESS NOTES
37 y.o. female  at 26w0d   Reports + FM, denies VB, LOF or CTX  Doing well without concerns     AMA-ultrasound at 32 and 36 weeks.  Daily baby aspirin    TWG: 15 lbs    28wk labs today (A POS)    Consider Tdap next visit    Reviewed warning signs, normal FKCs,  labor precautions and how/when to call.  RTC x 2 wks, call or present sooner prn.     I spent a total of 20 minutes on the day of the visit.This includes face to face time and non-face to face time preparing to see the patient (eg, review of tests), Obtaining and/or reviewing separately obtained history, Documenting clinical information in the electronic or other health record, Independently interpreting resultsand communicating results to the patient/family/caregiver, or Care coordination.

## 2022-12-29 ENCOUNTER — TELEPHONE (OUTPATIENT)
Dept: OBSTETRICS AND GYNECOLOGY | Facility: CLINIC | Age: 37
End: 2022-12-29
Payer: MEDICAID

## 2022-12-29 LAB — RPR SER QL: NORMAL

## 2022-12-29 NOTE — TELEPHONE ENCOUNTER
Called and spoke with patient. Pt. Complains of back and chest pain. Advised pt. To go to ER to be evaluated. Pt. Voiced understanding.

## 2022-12-29 NOTE — TELEPHONE ENCOUNTER
Pt called in regards to having chest pains and back pain. Advised pt to go to ER to be assessed if Chest pains are persistent       Elle COLÓN LPN  OB/GYN

## 2022-12-29 NOTE — TELEPHONE ENCOUNTER
"Pt is 26 weeks pregnant and states, "I have been having horrible pain all day in my stomach and my back. It goes up in between my shoulder blades and it hurts to breathe." Pt can be heard grimacing in pain and struggling to breathe over the phone. She rates her pain currently at a 10/10. Denies any vaginal bleeding or discharge.    Care Advice recommends that pt call  now. Pt verbalizes understanding and is instructed to call back with any additional questions or concerns.   Reason for Disposition   [1] SEVERE abdominal pain (e.g., excruciating) AND [2] constant AND [3] present > 1 hour    Additional Information   Negative: Passed out (i.e., lost consciousness, collapsed and was not responding)   Negative: Shock suspected (e.g., cold/pale/clammy skin, too weak to stand, low BP, rapid pulse)   Negative: Difficult to awaken or acting confused (e.g., disoriented, slurred speech)    Protocols used: Pregnancy - Abdominal Pain Greater Than 20 Weeks [id number]    "

## 2022-12-29 NOTE — TELEPHONE ENCOUNTER
----- Message from Hilaria Aguayo sent at 12/29/2022 12:25 PM CST -----  Regarding: Patient advice  Contact: Pt  Pt called in regards to having chest and back pain      Please advise, Pt can be reached at [phone]

## 2023-01-11 ENCOUNTER — PATIENT MESSAGE (OUTPATIENT)
Dept: OTHER | Facility: OTHER | Age: 38
End: 2023-01-11
Payer: MEDICAID

## 2023-01-11 ENCOUNTER — ROUTINE PRENATAL (OUTPATIENT)
Dept: OBSTETRICS AND GYNECOLOGY | Facility: CLINIC | Age: 38
End: 2023-01-11
Payer: MEDICAID

## 2023-01-11 VITALS — WEIGHT: 293 LBS | SYSTOLIC BLOOD PRESSURE: 118 MMHG | BODY MASS INDEX: 43.97 KG/M2 | DIASTOLIC BLOOD PRESSURE: 72 MMHG

## 2023-01-11 DIAGNOSIS — O09.40 GRAND MULTIPARITY, WITH CURRENT PREGNANCY, ANTEPARTUM: ICD-10-CM

## 2023-01-11 DIAGNOSIS — O09.529 ANTEPARTUM MULTIGRAVIDA OF ADVANCED MATERNAL AGE: Primary | ICD-10-CM

## 2023-01-11 PROCEDURE — 90471 IMMUNIZATION ADMIN: CPT | Mod: PBBFAC,PN

## 2023-01-11 PROCEDURE — 99999 PR PBB SHADOW E&M-EST. PATIENT-LVL III: ICD-10-PCS | Mod: PBBFAC,,, | Performed by: ADVANCED PRACTICE MIDWIFE

## 2023-01-11 PROCEDURE — 99213 OFFICE O/P EST LOW 20 MIN: CPT | Mod: TH,S$PBB,, | Performed by: ADVANCED PRACTICE MIDWIFE

## 2023-01-11 PROCEDURE — 90715 TDAP VACCINE 7 YRS/> IM: CPT | Mod: PBBFAC,PN

## 2023-01-11 PROCEDURE — 99213 OFFICE O/P EST LOW 20 MIN: CPT | Mod: PBBFAC,TH,PN | Performed by: ADVANCED PRACTICE MIDWIFE

## 2023-01-11 PROCEDURE — 99999 PR PBB SHADOW E&M-EST. PATIENT-LVL III: CPT | Mod: PBBFAC,,, | Performed by: ADVANCED PRACTICE MIDWIFE

## 2023-01-11 PROCEDURE — 99213 PR OFFICE/OUTPT VISIT, EST, LEVL III, 20-29 MIN: ICD-10-PCS | Mod: TH,S$PBB,, | Performed by: ADVANCED PRACTICE MIDWIFE

## 2023-01-11 NOTE — PROGRESS NOTES
Tdap given IM to right deltoid.  Pt tolerated well.  Pt advised to wait 10-15 minutes for s/s of medication reaction.  Appt made for next visit on 1/25/2023 at 8:40am at [facility], per pt request.  Pt voiced understanding.  MARIAH WOODWARD

## 2023-01-11 NOTE — PROGRESS NOTES
37 y.o. female  at 28w0d   Reports + FM, denies VB, LOF or CTX  Doing well without concerns     AMA-ultrasound at 32 and 36 weeks-scheduled, encourage daily baby aspirin    TW lbs    28wk labs (A POS) 1 hour glucose is 90-pass    Give Tdap today    Considering contraceptive options    Reviewed warning signs, normal FKCs,  labor precautions and how/when to call.  RTC x 2 wks, call or present sooner prn.     I spent a total of 20 minutes on the day of the visit.This includes face to face time and non-face to face time preparing to see the patient (eg, review of tests), Obtaining and/or reviewing separately obtained history, Documenting clinical information in the electronic or other health record, Independently interpreting resultsand communicating results to the patient/family/caregiver, or Care coordination.

## 2023-01-11 NOTE — PATIENT INSTRUCTIONS
Patient Education       Pregnancy - The Sixth Month   About this topic   It is important for you to learn how to take care of yourself to help you have a healthy baby and safe delivery. It is good to have health care throughout your pregnancy.  The sixth month of your pregnancy starts around week 24 and lasts through week 28. By knowing how far along you are, you can learn what is normal for this stage of your pregnancy and plan for what is next.  General   Your body   During the sixth month of your pregnancy, here are some things you can expect.  You may:  Start to gain a little more weight. It is normal to gain about 10 to 15 pounds (4.5 to 7 kg) total in your first 6 months.  Have problems like back pain, dry eyes, or aching hands and wrists  Itching of palms, abdomen, or soles of your feet  Swelling of ankles, fingers, or face.  Need to urinate more frequently.  Have problems with hemorrhoids. Be sure to eat plenty of fresh fruits and vegetables to increase the fiber in your diet. Drink plenty of water to help keep your stools soft.  Continue having Craig Wang contractions. You may feel your belly squeezing or getting tight.  Have a glucose test to test for gestational diabetes.  Have more headaches. Talk to your doctor about how to help with them.  See stretch marks on your belly, breasts, or legs. Stay active to try and keep good muscle tone.  See an increase in vaginal discharge. Talk to your doctor about what to expect.  Your baby's growth and development:  Your baby looks like a very small  baby.  They are able to live outside of your womb but would need a lot of help breathing, eating, and staying warm in an intensive care unit.  Your baby has times of being awake and times of being asleep. The babys eyelids are able to open and close.  They move more and have hiccups.  Your baby is about 14 inches (36 cm) long and weighs about 2 pounds (900 gm). Your baby is about the size of an eggplant.  Baby  may be able to hear voices.  Things to Think About   Avoid alcohol, drugs, tobacco products, and second hand smoke  Eat small meals throughout the day instead of larger meals.  Avoid spicy, greasy, or fatty foods.  Avoid lying down or bending over for around 3 hours after eating  Warm baths are fine to help you relax. Avoid hot tubs while you are pregnant.  Avoid straining when having bowel movements.  Learning how to care for your baby. You may want to sign up for classes on how to take care of a .  Are you planning on going back to work after having your baby? Its not too early to think about .  Consider starting your birth plan if you have specific needs or wishes for delivery.  When do I need to call the doctor?   Contractions every 10 minutes or more often that do not go away with drinking water or position changes  Low, dull back pain that does not go away  Pressure in your pelvis that feels like your baby is pushing down  A gush or constant trickle of watery or bloody fluid leaking from your vagina  Cramps in your lower belly that come and go or are constant  Change in your baby's movement  Fever of 100.4°F (38°C) or higher  Little to no movement felt by baby in 2 hours. Your baby should be moving at least 10 times every 2 hours.  Headache that does not go away; blurry vision; seeing spots or halos; increase in swelling in your hands, feet, or face; and pain under your ribs on the right side  Vaginal bleeding with or without pain  After a car accident, fall, or any trauma to your belly  Having thoughts of harming yourself or others, or do not feel safe at home  Where can I learn more?   American Academy of Family Physicians  https://familydoctor.org/changes-in-your-body-during-pregnancy-second-trimester/   KidsHealth  http://kidshealth.org/en/parents/pregnancy-calendar-intro.html?WT.ac=p-barbie   Office of Womens  Health  https://www.womenshealth.gov/pregnancy/youre-pregnant-now-what/stages-pregnancy   Last Reviewed Date   2020-05-06  Consumer Information Use and Disclaimer   This information is not specific medical advice and does not replace information you receive from your health care provider. This is only a brief summary of general information. It does NOT include all information about conditions, illnesses, injuries, tests, procedures, treatments, therapies, discharge instructions or life-style choices that may apply to you. You must talk with your health care provider for complete information about your health and treatment options. This information should not be used to decide whether or not to accept your health care providers advice, instructions or recommendations. Only your health care provider has the knowledge and training to provide advice that is right for you.  Copyright   Copyright © 2021 UpToDate, Inc. and its affiliates and/or licensors. All rights reserved.  Patient Education       Fetal Movement   About this topic   Feeling your baby move for the first time is a good sign that your baby is doing well. You may begin to feel these movements between the 18th and 25th weeks of your pregnancy. If this is your first time being pregnant, it may be closer to 25 weeks. Your baby has been moving around before this, but the kicks have not been strong enough for you to feel. During the first weeks of feeling movement, you may start to see a pattern during the day when your baby is most active. You can track your baby's kicks each day at home. This is also known as kick counting. It is a good way to check on your baby's movements and well being.  Most often, fetal kick counting is used in high-risk pregnancies. It may be useful for all pregnancies. Counting and writing down your baby's kicks, jabs, twists, flutters, rolls, turns, flips, and swishes may help find a problem that needs more evaluation. The American  "College of Obstetricians and Gynecologists, or ACOG, suggests that you record how much time it takes you to feel 10 of these movements. Ideally, you should be able to feel 10 movements within 2 hours. Many people will track these movements in much less time.  General   How to Track Your Baby's Kick Counts   Most often your doctor will want you to wait until the 28th to 30th weeks of your pregnancy to start kick counting. Here are some tips to help you get started.  Find the time of day when your baby is most active. For some people, this is right after eating. Others find their baby moving a lot after they have been exercising or more active. Some babies are more active in the evenings when your blood sugar starts to lower.  Try to count kicks at about the same time each day.  Before you start counting, have something to eat or drink. Also take a short walk or do some light activity.  Choose a quiet place where you can focus on your baby's movements. Also get in a comfortable position. Try and lie on one side or the other. You may need to change positions until you find one that works best for you and your baby.  Keep a notebook to track your baby's kicks. Your doctor may give you a chart to use or you can make your own. Write down the date, time you started counting, and the time of each "kick" during a 2-hour period until you have felt 10 kicks.  Once you have recorded 10 kicks within 2 hours you can stop counting.  If you are not able to record 10 movements over 2 hours you should get up and move around or eat something and try again.  If you are not able to record 10 movements over 2 hours the second time, call your doctor. They may want you to go to the hospital to get your baby checked.  When do I need to call the doctor?   You have felt less than 10 movements over a period of 2 hours.  It takes longer each day to record 10 movements.  There is a big change in the pattern of movements you are writing " down.  You feel no movement for 2 hours even after eating a snack, light activity, and position changes.  Teach Back: Helping You Understand   The Teach Back Method helps you understand the information we are giving you. After you talk with the staff, tell them in your own words what you learned. This helps to make sure the staff has described each thing clearly. It also helps to explain things that may have been confusing. Before going home, make sure you can do these:  I can tell you about feeling my baby move.  I can tell you how I will track my babys kicks.  I can tell you what I will do if I feel less than 10 movements in 2 hours, it takes longer to feel my baby move 10 times, or there is a big change in how my baby is moving.  Last Reviewed Date   2021-10-08  Consumer Information Use and Disclaimer   This information is not specific medical advice and does not replace information you receive from your health care provider. This is only a brief summary of general information. It does NOT include all information about conditions, illnesses, injuries, tests, procedures, treatments, therapies, discharge instructions or life-style choices that may apply to you. You must talk with your health care provider for complete information about your health and treatment options. This information should not be used to decide whether or not to accept your health care providers advice, instructions or recommendations. Only your health care provider has the knowledge and training to provide advice that is right for you.  Copyright   Copyright © 2021 UpToDate, [employer]. and its affiliates and/or licensors. All rights reserved.

## 2023-01-17 ENCOUNTER — ROUTINE PRENATAL (OUTPATIENT)
Dept: OBSTETRICS AND GYNECOLOGY | Facility: CLINIC | Age: 38
End: 2023-01-17
Payer: MEDICAID

## 2023-01-17 ENCOUNTER — LAB VISIT (OUTPATIENT)
Dept: LAB | Facility: HOSPITAL | Age: 38
End: 2023-01-17
Attending: ADVANCED PRACTICE MIDWIFE
Payer: MEDICAID

## 2023-01-17 VITALS — WEIGHT: 293 LBS | SYSTOLIC BLOOD PRESSURE: 126 MMHG | BODY MASS INDEX: 43.63 KG/M2 | DIASTOLIC BLOOD PRESSURE: 78 MMHG

## 2023-01-17 DIAGNOSIS — R03.0 ELEVATED BLOOD PRESSURE READING: ICD-10-CM

## 2023-01-17 DIAGNOSIS — O09.40 GRAND MULTIPARITY, WITH CURRENT PREGNANCY, ANTEPARTUM: ICD-10-CM

## 2023-01-17 DIAGNOSIS — O09.893 PRIOR PREGNANCY COMPLICATED BY PIH, ANTEPARTUM, THIRD TRIMESTER: ICD-10-CM

## 2023-01-17 DIAGNOSIS — O09.529 ANTEPARTUM MULTIGRAVIDA OF ADVANCED MATERNAL AGE: Primary | ICD-10-CM

## 2023-01-17 PROCEDURE — 82570 ASSAY OF URINE CREATININE: CPT | Performed by: ADVANCED PRACTICE MIDWIFE

## 2023-01-17 PROCEDURE — 99213 OFFICE O/P EST LOW 20 MIN: CPT | Mod: PBBFAC,TH,PN | Performed by: ADVANCED PRACTICE MIDWIFE

## 2023-01-17 PROCEDURE — 80053 COMPREHEN METABOLIC PANEL: CPT | Performed by: ADVANCED PRACTICE MIDWIFE

## 2023-01-17 PROCEDURE — 99999 PR PBB SHADOW E&M-EST. PATIENT-LVL III: ICD-10-PCS | Mod: PBBFAC,,, | Performed by: ADVANCED PRACTICE MIDWIFE

## 2023-01-17 PROCEDURE — 99213 OFFICE O/P EST LOW 20 MIN: CPT | Mod: TH,S$PBB,, | Performed by: ADVANCED PRACTICE MIDWIFE

## 2023-01-17 PROCEDURE — 85025 COMPLETE CBC W/AUTO DIFF WBC: CPT | Performed by: ADVANCED PRACTICE MIDWIFE

## 2023-01-17 PROCEDURE — 36415 COLL VENOUS BLD VENIPUNCTURE: CPT | Mod: PN | Performed by: ADVANCED PRACTICE MIDWIFE

## 2023-01-17 PROCEDURE — 99213 PR OFFICE/OUTPT VISIT, EST, LEVL III, 20-29 MIN: ICD-10-PCS | Mod: TH,S$PBB,, | Performed by: ADVANCED PRACTICE MIDWIFE

## 2023-01-17 PROCEDURE — 99999 PR PBB SHADOW E&M-EST. PATIENT-LVL III: CPT | Mod: PBBFAC,,, | Performed by: ADVANCED PRACTICE MIDWIFE

## 2023-01-17 NOTE — PATIENT INSTRUCTIONS
Patient Education       Pregnancy - The Sixth Month   About this topic   It is important for you to learn how to take care of yourself to help you have a healthy baby and safe delivery. It is good to have health care throughout your pregnancy.  The sixth month of your pregnancy starts around week 24 and lasts through week 28. By knowing how far along you are, you can learn what is normal for this stage of your pregnancy and plan for what is next.  General   Your body   During the sixth month of your pregnancy, here are some things you can expect.  You may:  Start to gain a little more weight. It is normal to gain about 10 to 15 pounds (4.5 to 7 kg) total in your first 6 months.  Have problems like back pain, dry eyes, or aching hands and wrists  Itching of palms, abdomen, or soles of your feet  Swelling of ankles, fingers, or face.  Need to urinate more frequently.  Have problems with hemorrhoids. Be sure to eat plenty of fresh fruits and vegetables to increase the fiber in your diet. Drink plenty of water to help keep your stools soft.  Continue having Dodge Wang contractions. You may feel your belly squeezing or getting tight.  Have a glucose test to test for gestational diabetes.  Have more headaches. Talk to your doctor about how to help with them.  See stretch marks on your belly, breasts, or legs. Stay active to try and keep good muscle tone.  See an increase in vaginal discharge. Talk to your doctor about what to expect.  Your baby's growth and development:  Your baby looks like a very small  baby.  They are able to live outside of your womb but would need a lot of help breathing, eating, and staying warm in an intensive care unit.  Your baby has times of being awake and times of being asleep. The babys eyelids are able to open and close.  They move more and have hiccups.  Your baby is about 14 inches (36 cm) long and weighs about 2 pounds (900 gm). Your baby is about the size of an eggplant.  Baby  may be able to hear voices.  Things to Think About   Avoid alcohol, drugs, tobacco products, and second hand smoke  Eat small meals throughout the day instead of larger meals.  Avoid spicy, greasy, or fatty foods.  Avoid lying down or bending over for around 3 hours after eating  Warm baths are fine to help you relax. Avoid hot tubs while you are pregnant.  Avoid straining when having bowel movements.  Learning how to care for your baby. You may want to sign up for classes on how to take care of a .  Are you planning on going back to work after having your baby? Its not too early to think about .  Consider starting your birth plan if you have specific needs or wishes for delivery.  When do I need to call the doctor?   Contractions every 10 minutes or more often that do not go away with drinking water or position changes  Low, dull back pain that does not go away  Pressure in your pelvis that feels like your baby is pushing down  A gush or constant trickle of watery or bloody fluid leaking from your vagina  Cramps in your lower belly that come and go or are constant  Change in your baby's movement  Fever of 100.4°F (38°C) or higher  Little to no movement felt by baby in 2 hours. Your baby should be moving at least 10 times every 2 hours.  Headache that does not go away; blurry vision; seeing spots or halos; increase in swelling in your hands, feet, or face; and pain under your ribs on the right side  Vaginal bleeding with or without pain  After a car accident, fall, or any trauma to your belly  Having thoughts of harming yourself or others, or do not feel safe at home  Where can I learn more?   American Academy of Family Physicians  https://familydoctor.org/changes-in-your-body-during-pregnancy-second-trimester/   Kids[employer]  http://kidshealth.org/en/parents/pregnancy-calendar-intro.html?WT.ac=p-barbie   Office of Womens  Health  https://www.womenshealth.gov/pregnancy/youre-pregnant-now-what/stages-pregnancy   Last Reviewed Date   2020-05-06  Consumer Information Use and Disclaimer   This information is not specific medical advice and does not replace information you receive from your health care provider. This is only a brief summary of general information. It does NOT include all information about conditions, illnesses, injuries, tests, procedures, treatments, therapies, discharge instructions or life-style choices that may apply to you. You must talk with your health care provider for complete information about your health and treatment options. This information should not be used to decide whether or not to accept your health care providers advice, instructions or recommendations. Only your health care provider has the knowledge and training to provide advice that is right for you.  Copyright   Copyright © 2021 UpToDate, Inc. and its affiliates and/or licensors. All rights reserved.  Patient Education       Fetal Movement   About this topic   Feeling your baby move for the first time is a good sign that your baby is doing well. You may begin to feel these movements between the 18th and 25th weeks of your pregnancy. If this is your first time being pregnant, it may be closer to 25 weeks. Your baby has been moving around before this, but the kicks have not been strong enough for you to feel. During the first weeks of feeling movement, you may start to see a pattern during the day when your baby is most active. You can track your baby's kicks each day at home. This is also known as kick counting. It is a good way to check on your baby's movements and well being.  Most often, fetal kick counting is used in high-risk pregnancies. It may be useful for all pregnancies. Counting and writing down your baby's kicks, jabs, twists, flutters, rolls, turns, flips, and swishes may help find a problem that needs more evaluation. The American  "College of Obstetricians and Gynecologists, or ACOG, suggests that you record how much time it takes you to feel 10 of these movements. Ideally, you should be able to feel 10 movements within 2 hours. Many people will track these movements in much less time.  General   How to Track Your Baby's Kick Counts   Most often your doctor will want you to wait until the 28th to 30th weeks of your pregnancy to start kick counting. Here are some tips to help you get started.  Find the time of day when your baby is most active. For some people, this is right after eating. Others find their baby moving a lot after they have been exercising or more active. Some babies are more active in the evenings when your blood sugar starts to lower.  Try to count kicks at about the same time each day.  Before you start counting, have something to eat or drink. Also take a short walk or do some light activity.  Choose a quiet place where you can focus on your baby's movements. Also get in a comfortable position. Try and lie on one side or the other. You may need to change positions until you find one that works best for you and your baby.  Keep a notebook to track your baby's kicks. Your doctor may give you a chart to use or you can make your own. Write down the date, time you started counting, and the time of each "kick" during a 2-hour period until you have felt 10 kicks.  Once you have recorded 10 kicks within 2 hours you can stop counting.  If you are not able to record 10 movements over 2 hours you should get up and move around or eat something and try again.  If you are not able to record 10 movements over 2 hours the second time, call your doctor. They may want you to go to the hospital to get your baby checked.  When do I need to call the doctor?   You have felt less than 10 movements over a period of 2 hours.  It takes longer each day to record 10 movements.  There is a big change in the pattern of movements you are writing " down.  You feel no movement for 2 hours even after eating a snack, light activity, and position changes.  Teach Back: Helping You Understand   The Teach Back Method helps you understand the information we are giving you. After you talk with the staff, tell them in your own words what you learned. This helps to make sure the staff has described each thing clearly. It also helps to explain things that may have been confusing. Before going home, make sure you can do these:  I can tell you about feeling my baby move.  I can tell you how I will track my babys kicks.  I can tell you what I will do if I feel less than 10 movements in 2 hours, it takes longer to feel my baby move 10 times, or there is a big change in how my baby is moving.  Last Reviewed Date   2021-10-08  Consumer Information Use and Disclaimer   This information is not specific medical advice and does not replace information you receive from your health care provider. This is only a brief summary of general information. It does NOT include all information about conditions, illnesses, injuries, tests, procedures, treatments, therapies, discharge instructions or life-style choices that may apply to you. You must talk with your health care provider for complete information about your health and treatment options. This information should not be used to decide whether or not to accept your health care providers advice, instructions or recommendations. Only your health care provider has the knowledge and training to provide advice that is right for you.  Copyright   Copyright © 2021 UpToDate, Inc. and its affiliates and/or licensors. All rights reserved.

## 2023-01-17 NOTE — PROGRESS NOTES
37 y.o. female  at 28w6d   Reports + FM, denies VB, LOF or CTX  Doing well, presents today secondary to elevated home blood pressure readings requested by Dr. Downey     History of PIH at 39+ 6 weeks with last pregnancy variances labile blood pressures at home, 126/150 9/70 7-81.  Asymptomatic.    Blood pressure today 126/78 trace protein 2+ DTRs and feeling well.    Will send [facility] labs for evaluation and precautions reviewed     28wk labs (A POS) 1 hour glucose is 90-pass     Reviewed warning signs, normal FKCs,  labor precautions and how/when to call.  RTC x 1 wks, call or present sooner prn.     I spent a total of 20 minutes on the day of the visit.This includes face to face time and non-face to face time preparing to see the patient (eg, review of tests), Obtaining and/or reviewing separately obtained history, Documenting clinical information in the electronic or other health record, Independently interpreting resultsand communicating results to the patient/family/caregiver, or Care coordination.

## 2023-01-18 LAB
ALBUMIN SERPL BCP-MCNC: 2.8 G/DL (ref 3.5–5.2)
ALP SERPL-CCNC: 65 U/L (ref 55–135)
ALT SERPL W/O P-5'-P-CCNC: 10 U/L (ref 10–44)
ANION GAP SERPL CALC-SCNC: 7 MMOL/L (ref 8–16)
AST SERPL-CCNC: 15 U/L (ref 10–40)
BASOPHILS # BLD AUTO: 0.02 K/UL (ref 0–0.2)
BASOPHILS NFR BLD: 0.4 % (ref 0–1.9)
BILIRUB SERPL-MCNC: 0.3 MG/DL (ref 0.1–1)
BUN SERPL-MCNC: 5 MG/DL (ref 6–20)
CALCIUM SERPL-MCNC: 8.8 MG/DL (ref 8.7–10.5)
CHLORIDE SERPL-SCNC: 109 MMOL/L (ref 95–110)
CO2 SERPL-SCNC: 22 MMOL/L (ref 23–29)
CREAT SERPL-MCNC: 0.8 MG/DL (ref 0.5–1.4)
CREAT UR-MCNC: 231.1 MG/DL (ref 15–325)
DIFFERENTIAL METHOD: ABNORMAL
EOSINOPHIL # BLD AUTO: 0.1 K/UL (ref 0–0.5)
EOSINOPHIL NFR BLD: 1.6 % (ref 0–8)
ERYTHROCYTE [DISTWIDTH] IN BLOOD BY AUTOMATED COUNT: 14.2 % (ref 11.5–14.5)
EST. GFR  (NO RACE VARIABLE): >60 ML/MIN/1.73 M^2
GLUCOSE SERPL-MCNC: 73 MG/DL (ref 70–110)
HCT VFR BLD AUTO: 37.7 % (ref 37–48.5)
HGB BLD-MCNC: 12.2 G/DL (ref 12–16)
IMM GRANULOCYTES # BLD AUTO: 0.01 K/UL (ref 0–0.04)
IMM GRANULOCYTES NFR BLD AUTO: 0.2 % (ref 0–0.5)
LYMPHOCYTES # BLD AUTO: 1.5 K/UL (ref 1–4.8)
LYMPHOCYTES NFR BLD: 30.2 % (ref 18–48)
MCH RBC QN AUTO: 31.9 PG (ref 27–31)
MCHC RBC AUTO-ENTMCNC: 32.4 G/DL (ref 32–36)
MCV RBC AUTO: 98 FL (ref 82–98)
MONOCYTES # BLD AUTO: 0.6 K/UL (ref 0.3–1)
MONOCYTES NFR BLD: 11.3 % (ref 4–15)
NEUTROPHILS # BLD AUTO: 2.8 K/UL (ref 1.8–7.7)
NEUTROPHILS NFR BLD: 56.3 % (ref 38–73)
NRBC BLD-RTO: 0 /100 WBC
PLATELET # BLD AUTO: 299 K/UL (ref 150–450)
PMV BLD AUTO: 11 FL (ref 9.2–12.9)
POTASSIUM SERPL-SCNC: 3.5 MMOL/L (ref 3.5–5.1)
PROT SERPL-MCNC: 6.9 G/DL (ref 6–8.4)
PROT UR-MCNC: 14 MG/DL (ref 0–15)
PROT/CREAT UR: 0.06 MG/G{CREAT} (ref 0–0.2)
RBC # BLD AUTO: 3.83 M/UL (ref 4–5.4)
SODIUM SERPL-SCNC: 138 MMOL/L (ref 136–145)
WBC # BLD AUTO: 4.96 K/UL (ref 3.9–12.7)

## 2023-01-25 ENCOUNTER — LAB VISIT (OUTPATIENT)
Dept: LAB | Facility: HOSPITAL | Age: 38
End: 2023-01-25
Attending: ADVANCED PRACTICE MIDWIFE
Payer: MEDICAID

## 2023-01-25 ENCOUNTER — PATIENT MESSAGE (OUTPATIENT)
Dept: OTHER | Facility: OTHER | Age: 38
End: 2023-01-25
Payer: MEDICAID

## 2023-01-25 ENCOUNTER — ROUTINE PRENATAL (OUTPATIENT)
Dept: OBSTETRICS AND GYNECOLOGY | Facility: CLINIC | Age: 38
End: 2023-01-25
Payer: MEDICAID

## 2023-01-25 VITALS — WEIGHT: 293 LBS | BODY MASS INDEX: 44.49 KG/M2 | SYSTOLIC BLOOD PRESSURE: 116 MMHG | DIASTOLIC BLOOD PRESSURE: 70 MMHG

## 2023-01-25 DIAGNOSIS — O09.893 PRIOR PREGNANCY COMPLICATED BY PIH, ANTEPARTUM, THIRD TRIMESTER: ICD-10-CM

## 2023-01-25 DIAGNOSIS — O09.529 ANTEPARTUM MULTIGRAVIDA OF ADVANCED MATERNAL AGE: Primary | ICD-10-CM

## 2023-01-25 DIAGNOSIS — O99.210 OBESITY AFFECTING PREGNANCY, ANTEPARTUM: ICD-10-CM

## 2023-01-25 DIAGNOSIS — O09.40 GRAND MULTIPARITY, WITH CURRENT PREGNANCY, ANTEPARTUM: ICD-10-CM

## 2023-01-25 LAB
ALBUMIN SERPL BCP-MCNC: 2.8 G/DL (ref 3.5–5.2)
ALP SERPL-CCNC: 74 U/L (ref 55–135)
ALT SERPL W/O P-5'-P-CCNC: 11 U/L (ref 10–44)
ANION GAP SERPL CALC-SCNC: 11 MMOL/L (ref 8–16)
AST SERPL-CCNC: 20 U/L (ref 10–40)
BASOPHILS # BLD AUTO: 0.02 K/UL (ref 0–0.2)
BASOPHILS NFR BLD: 0.4 % (ref 0–1.9)
BILIRUB SERPL-MCNC: 0.3 MG/DL (ref 0.1–1)
BUN SERPL-MCNC: 7 MG/DL (ref 6–20)
CALCIUM SERPL-MCNC: 8.7 MG/DL (ref 8.7–10.5)
CHLORIDE SERPL-SCNC: 106 MMOL/L (ref 95–110)
CO2 SERPL-SCNC: 22 MMOL/L (ref 23–29)
CREAT SERPL-MCNC: 0.6 MG/DL (ref 0.5–1.4)
CREAT UR-MCNC: 254.8 MG/DL (ref 15–325)
DIFFERENTIAL METHOD: ABNORMAL
EOSINOPHIL # BLD AUTO: 0.1 K/UL (ref 0–0.5)
EOSINOPHIL NFR BLD: 1.3 % (ref 0–8)
ERYTHROCYTE [DISTWIDTH] IN BLOOD BY AUTOMATED COUNT: 14 % (ref 11.5–14.5)
EST. GFR  (NO RACE VARIABLE): >60 ML/MIN/1.73 M^2
GLUCOSE SERPL-MCNC: 76 MG/DL (ref 70–110)
HCT VFR BLD AUTO: 37.9 % (ref 37–48.5)
HGB BLD-MCNC: 12 G/DL (ref 12–16)
IMM GRANULOCYTES # BLD AUTO: 0.01 K/UL (ref 0–0.04)
IMM GRANULOCYTES NFR BLD AUTO: 0.2 % (ref 0–0.5)
LYMPHOCYTES # BLD AUTO: 1.5 K/UL (ref 1–4.8)
LYMPHOCYTES NFR BLD: 29.4 % (ref 18–48)
MCH RBC QN AUTO: 31.2 PG (ref 27–31)
MCHC RBC AUTO-ENTMCNC: 31.7 G/DL (ref 32–36)
MCV RBC AUTO: 98 FL (ref 82–98)
MONOCYTES # BLD AUTO: 0.4 K/UL (ref 0.3–1)
MONOCYTES NFR BLD: 8.1 % (ref 4–15)
NEUTROPHILS # BLD AUTO: 3.2 K/UL (ref 1.8–7.7)
NEUTROPHILS NFR BLD: 60.6 % (ref 38–73)
NRBC BLD-RTO: 0 /100 WBC
PLATELET # BLD AUTO: 284 K/UL (ref 150–450)
PMV BLD AUTO: 11 FL (ref 9.2–12.9)
POTASSIUM SERPL-SCNC: 3.4 MMOL/L (ref 3.5–5.1)
PROT SERPL-MCNC: 6.9 G/DL (ref 6–8.4)
PROT UR-MCNC: 20 MG/DL (ref 0–15)
PROT/CREAT UR: 0.08 MG/G{CREAT} (ref 0–0.2)
RBC # BLD AUTO: 3.85 M/UL (ref 4–5.4)
SODIUM SERPL-SCNC: 139 MMOL/L (ref 136–145)
WBC # BLD AUTO: 5.2 K/UL (ref 3.9–12.7)

## 2023-01-25 PROCEDURE — 99213 PR OFFICE/OUTPT VISIT, EST, LEVL III, 20-29 MIN: ICD-10-PCS | Mod: TH,S$PBB,, | Performed by: ADVANCED PRACTICE MIDWIFE

## 2023-01-25 PROCEDURE — 85025 COMPLETE CBC W/AUTO DIFF WBC: CPT | Performed by: ADVANCED PRACTICE MIDWIFE

## 2023-01-25 PROCEDURE — 99212 OFFICE O/P EST SF 10 MIN: CPT | Mod: PBBFAC,TH,PN | Performed by: ADVANCED PRACTICE MIDWIFE

## 2023-01-25 PROCEDURE — 99999 PR PBB SHADOW E&M-EST. PATIENT-LVL II: ICD-10-PCS | Mod: PBBFAC,,, | Performed by: ADVANCED PRACTICE MIDWIFE

## 2023-01-25 PROCEDURE — 80053 COMPREHEN METABOLIC PANEL: CPT | Performed by: ADVANCED PRACTICE MIDWIFE

## 2023-01-25 PROCEDURE — 99999 PR PBB SHADOW E&M-EST. PATIENT-LVL II: CPT | Mod: PBBFAC,,, | Performed by: ADVANCED PRACTICE MIDWIFE

## 2023-01-25 PROCEDURE — 82570 ASSAY OF URINE CREATININE: CPT | Performed by: ADVANCED PRACTICE MIDWIFE

## 2023-01-25 PROCEDURE — 36415 COLL VENOUS BLD VENIPUNCTURE: CPT | Mod: PN | Performed by: ADVANCED PRACTICE MIDWIFE

## 2023-01-25 PROCEDURE — 99213 OFFICE O/P EST LOW 20 MIN: CPT | Mod: TH,S$PBB,, | Performed by: ADVANCED PRACTICE MIDWIFE

## 2023-01-25 NOTE — PATIENT INSTRUCTIONS
Patient Education       Pregnancy - The Seventh Month   About this topic   It is important for you to learn how to take care of yourself to help you have a healthy baby and safe delivery. It is good to have health care throughout your pregnancy.  The seventh month of your pregnancy starts around week 29 and lasts through week 32. By knowing how far along you are, you can learn what is normal for this stage of your pregnancy and plan for what is next.  General   Your body   During the seventh month of your pregnancy, here are some things you can expect.  You may:  Have weight gain of about 15 to 20 pounds (6.8 to 9 kg) total in your first 7 months.  Have more trouble moving about and sleeping as the baby gets bigger  Have very vivid dreams  Notice more vaginal discharge  Notice a creamy, watery substance leaking from your nipples  Need a shot called Rh immune globulin if your blood type may be different from your baby's  Your baby's growth and development:  Your baby is gaining weight and adding layers of fat.  Your baby turns to be head down, into the position for delivery.  They are able to respond to loud noises and to dream.  Your baby moves at least 10 times in 2 hours. If your baby isn't moving this much, talk to your doctor right away.  Your baby is about 16 inches (42 cm) long and weighs about 4 pounds (1,800 gm). Your baby is about the size of squash.  Things to Think About   Avoid alcohol, drugs, tobacco products, and second hand smoke  Think about what you want your baby's birth to be like. Who do you want with you?  Find out about what lactation services are covered by your insurance.  Look into lactation consultants and breastfeeding classes.  Where do you want to deliver? Its time to make a plan for labor and delivery.  Plan on getting a car seat and other things for your baby.  Talk to your doctor if you plan to travel or get on a plane.  When do I need to call the doctor?   Contractions every 10  minutes or more often that do not go away with drinking water or position changes.  Low, dull back pain that does not go away  Feeling unusually dizzy or tired  Pressure in your pelvis that feels like your baby is pushing down  A gush or constant trickle of watery or bloody fluid leaking from your vagina  Cramps in your lower belly that come and go or are constant  Little to no movement felt by baby in 2 hours. Your baby should be moving at least 10 times every 2 hours.  Headache that does not go away; blurry vision; seeing spots or halos; increase in swelling in your hands, feet, or face; and pain under your ribs on the right side  Vaginal bleeding with or without pain  Fever of 100.4°F (38°C) or higher  After a car accident, fall, or any trauma to your belly  Having thoughts of harming yourself or others, or do not feel safe at home  Where can I learn more?   American Academy of Family Physicians  https://familydoctor.org/changes-in-your-body-during-pregnancy-second-trimester/   American Academy of Family Physicians  https://familydoctor.org/changes-in-your-body-during-pregnancy-third-trimester/   [employer]  http://kidshealth.org/en/parents/pregnancy-calendar-intro.html?WT.ac=p-barbei   Office on Womens Health  https://www.womenshealth.gov/pregnancy/youre-pregnant-now-what/stages-pregnancy   Last Reviewed Date   2020-05-06  Consumer Information Use and Disclaimer   This information is not specific medical advice and does not replace information you receive from your health care provider. This is only a brief summary of general information. It does NOT include all information about conditions, illnesses, injuries, tests, procedures, treatments, therapies, discharge instructions or life-style choices that may apply to you. You must talk with your health care provider for complete information about your health and treatment options. This information should not be used to decide whether or not to accept your health  care providers advice, instructions or recommendations. Only your health care provider has the knowledge and training to provide advice that is right for you.  Copyright   Copyright © 2021 UpToDate, Inc. and its affiliates and/or licensors. All rights reserved.  Patient Education       Fetal Movement   About this topic   Feeling your baby move for the first time is a good sign that your baby is doing well. You may begin to feel these movements between the 18th and 25th weeks of your pregnancy. If this is your first time being pregnant, it may be closer to 25 weeks. Your baby has been moving around before this, but the kicks have not been strong enough for you to feel. During the first weeks of feeling movement, you may start to see a pattern during the day when your baby is most active. You can track your baby's kicks each day at home. This is also known as kick counting. It is a good way to check on your baby's movements and well being.  Most often, fetal kick counting is used in high-risk pregnancies. It may be useful for all pregnancies. Counting and writing down your baby's kicks, jabs, twists, flutters, rolls, turns, flips, and swishes may help find a problem that needs more evaluation. The American College of Obstetricians and Gynecologists, or ACOG, suggests that you record how much time it takes you to feel 10 of these movements. Ideally, you should be able to feel 10 movements within 2 hours. Many people will track these movements in much less time.  General   How to Track Your Baby's Kick Counts   Most often your doctor will want you to wait until the 28th to 30th weeks of your pregnancy to start kick counting. Here are some tips to help you get started.  Find the time of day when your baby is most active. For some people, this is right after eating. Others find their baby moving a lot after they have been exercising or more active. Some babies are more active in the evenings when your blood sugar starts  "to lower.  Try to count kicks at about the same time each day.  Before you start counting, have something to eat or drink. Also take a short walk or do some light activity.  Choose a quiet place where you can focus on your baby's movements. Also get in a comfortable position. Try and lie on one side or the other. You may need to change positions until you find one that works best for you and your baby.  Keep a notebook to track your baby's kicks. Your doctor may give you a chart to use or you can make your own. Write down the date, time you started counting, and the time of each "kick" during a 2-hour period until you have felt 10 kicks.  Once you have recorded 10 kicks within 2 hours you can stop counting.  If you are not able to record 10 movements over 2 hours you should get up and move around or eat something and try again.  If you are not able to record 10 movements over 2 hours the second time, call your doctor. They may want you to go to the hospital to get your baby checked.  When do I need to call the doctor?   You have felt less than 10 movements over a period of 2 hours.  It takes longer each day to record 10 movements.  There is a big change in the pattern of movements you are writing down.  You feel no movement for 2 hours even after eating a snack, light activity, and position changes.  Teach Back: Helping You Understand   The Teach Back Method helps you understand the information we are giving you. After you talk with the staff, tell them in your own words what you learned. This helps to make sure the staff has described each thing clearly. It also helps to explain things that may have been confusing. Before going home, make sure you can do these:  I can tell you about feeling my baby move.  I can tell you how I will track my babys kicks.  I can tell you what I will do if I feel less than 10 movements in 2 hours, it takes longer to feel my baby move 10 times, or there is a big change in how my baby " is moving.  Last Reviewed Date   2021-10-08  Consumer Information Use and Disclaimer   This information is not specific medical advice and does not replace information you receive from your health care provider. This is only a brief summary of general information. It does NOT include all information about conditions, illnesses, injuries, tests, procedures, treatments, therapies, discharge instructions or life-style choices that may apply to you. You must talk with your health care provider for complete information about your health and treatment options. This information should not be used to decide whether or not to accept your health care providers advice, instructions or recommendations. Only your health care provider has the knowledge and training to provide advice that is right for you.  Copyright   Copyright © 2021 UpToDate, Inc. and its affiliates and/or licensors. All rights reserved.

## 2023-01-25 NOTE — PROGRESS NOTES
37 y.o. female  at 30w0d   Reports + FM, denies VB, LOF or CTX  Doing well without concerns     History of PIH at 39+ 6 weeks with last pregnancy.  Reviewed PIH labs unremarkable with PCR 0.06  States has been experiencing recent headaches and nausea the last couple of days.  PIH precautions reviewed and will send PIH labs today for comparison and evaluation  Connected mom blood pressures for this past week 115 to 149/7679    AMA-ultrasound at 32 and 30 weeks.  Advised daily baby aspirin    TW lbs   Reviewed 28wk lab results (A POS)  Had Tdap    Reviewed warning signs, normal FKCs,  labor precautions and how/when to call.  RTC x 2 wks, with US call or present sooner prn.     I spent a total of 20 minutes on the day of the visit.This includes face to face time and non-face to face time preparing to see the patient (eg, review of tests), Obtaining and/or reviewing separately obtained history, Documenting clinical information in the electronic or other health record, Independently interpreting resultsand communicating results to the patient/family/caregiver, or Care coordination.

## 2023-02-08 ENCOUNTER — PROCEDURE VISIT (OUTPATIENT)
Dept: OBSTETRICS AND GYNECOLOGY | Facility: CLINIC | Age: 38
End: 2023-02-08
Payer: MEDICAID

## 2023-02-08 ENCOUNTER — ROUTINE PRENATAL (OUTPATIENT)
Dept: OBSTETRICS AND GYNECOLOGY | Facility: CLINIC | Age: 38
End: 2023-02-08
Payer: MEDICAID

## 2023-02-08 ENCOUNTER — PATIENT MESSAGE (OUTPATIENT)
Dept: OTHER | Facility: OTHER | Age: 38
End: 2023-02-08
Payer: MEDICAID

## 2023-02-08 VITALS — BODY MASS INDEX: 44.31 KG/M2 | WEIGHT: 293 LBS | DIASTOLIC BLOOD PRESSURE: 78 MMHG | SYSTOLIC BLOOD PRESSURE: 120 MMHG

## 2023-02-08 DIAGNOSIS — O99.210 OBESITY AFFECTING PREGNANCY, ANTEPARTUM: ICD-10-CM

## 2023-02-08 DIAGNOSIS — O09.529 ANTEPARTUM MULTIGRAVIDA OF ADVANCED MATERNAL AGE: Primary | ICD-10-CM

## 2023-02-08 DIAGNOSIS — O09.529 ANTEPARTUM MULTIGRAVIDA OF ADVANCED MATERNAL AGE: ICD-10-CM

## 2023-02-08 DIAGNOSIS — O09.40 GRAND MULTIPARITY, WITH CURRENT PREGNANCY, ANTEPARTUM: ICD-10-CM

## 2023-02-08 PROCEDURE — 76816 US OB/GYN PROCEDURE (VIEWPOINT): ICD-10-PCS | Mod: 26,S$PBB,, | Performed by: OBSTETRICS & GYNECOLOGY

## 2023-02-08 PROCEDURE — 99213 OFFICE O/P EST LOW 20 MIN: CPT | Mod: TH,S$PBB,, | Performed by: ADVANCED PRACTICE MIDWIFE

## 2023-02-08 PROCEDURE — 99212 OFFICE O/P EST SF 10 MIN: CPT | Mod: PBBFAC,TH,PN,25 | Performed by: ADVANCED PRACTICE MIDWIFE

## 2023-02-08 PROCEDURE — 76819 US OB/GYN PROCEDURE (VIEWPOINT): ICD-10-PCS | Mod: 26,S$PBB,, | Performed by: OBSTETRICS & GYNECOLOGY

## 2023-02-08 PROCEDURE — 99999 PR PBB SHADOW E&M-EST. PATIENT-LVL II: ICD-10-PCS | Mod: PBBFAC,,, | Performed by: ADVANCED PRACTICE MIDWIFE

## 2023-02-08 PROCEDURE — 99213 PR OFFICE/OUTPT VISIT, EST, LEVL III, 20-29 MIN: ICD-10-PCS | Mod: TH,S$PBB,, | Performed by: ADVANCED PRACTICE MIDWIFE

## 2023-02-08 PROCEDURE — 76816 OB US FOLLOW-UP PER FETUS: CPT | Mod: PBBFAC,PN | Performed by: OBSTETRICS & GYNECOLOGY

## 2023-02-08 PROCEDURE — 76819 FETAL BIOPHYS PROFIL W/O NST: CPT | Mod: 26,S$PBB,, | Performed by: OBSTETRICS & GYNECOLOGY

## 2023-02-08 PROCEDURE — 99999 PR PBB SHADOW E&M-EST. PATIENT-LVL II: CPT | Mod: PBBFAC,,, | Performed by: ADVANCED PRACTICE MIDWIFE

## 2023-02-08 NOTE — PROGRESS NOTES
37 y.o. female  at 32w0d   Reports + FM, denies VB, LOF or CTX  Doing well without concerns     History of elevated blood pressure and PIH with previous pregnancy.  Normotensive and doing well.  Last weeks PIH labs were unremarkable with PCR 0.08    AMA, taking daily baby aspirin.  Ultrasound today vertex, MVP 6.4 cm, EFW 4 lb 5 oz at 33 percentile with AC at 47 percentile, BPP 8 8-reassuring      TW lbs   Reviewed 28wk lab results (A POS)  Had Tdap    Reviewed warning signs, normal FKCs,  labor precautions and how/when to call.  RTC x 2 wks, call or present sooner prn.     I spent a total of 20 minutes on the day of the visit.This includes face to face time and non-face to face time preparing to see the patient (eg, review of tests), Obtaining and/or reviewing separately obtained history, Documenting clinical information in the electronic or other health record, Independently interpreting resultsand communicating results to the patient/family/caregiver, or Care coordination.

## 2023-02-08 NOTE — PATIENT INSTRUCTIONS
Patient Education       Pregnancy - The Seventh Month   About this topic   It is important for you to learn how to take care of yourself to help you have a healthy baby and safe delivery. It is good to have health care throughout your pregnancy.  The seventh month of your pregnancy starts around week 29 and lasts through week 32. By knowing how far along you are, you can learn what is normal for this stage of your pregnancy and plan for what is next.  General   Your body   During the seventh month of your pregnancy, here are some things you can expect.  You may:  Have weight gain of about 15 to 20 pounds (6.8 to 9 kg) total in your first 7 months.  Have more trouble moving about and sleeping as the baby gets bigger  Have very vivid dreams  Notice more vaginal discharge  Notice a creamy, watery substance leaking from your nipples  Need a shot called Rh immune globulin if your blood type may be different from your baby's  Your baby's growth and development:  Your baby is gaining weight and adding layers of fat.  Your baby turns to be head down, into the position for delivery.  They are able to respond to loud noises and to dream.  Your baby moves at least 10 times in 2 hours. If your baby isn't moving this much, talk to your doctor right away.  Your baby is about 16 inches (42 cm) long and weighs about 4 pounds (1,800 gm). Your baby is about the size of squash.  Things to Think About   Avoid alcohol, drugs, tobacco products, and second hand smoke  Think about what you want your baby's birth to be like. Who do you want with you?  Find out about what lactation services are covered by your insurance.  Look into lactation consultants and breastfeeding classes.  Where do you want to deliver? Its time to make a plan for labor and delivery.  Plan on getting a car seat and other things for your baby.  Talk to your doctor if you plan to travel or get on a plane.  When do I need to call the doctor?   Contractions every 10  minutes or more often that do not go away with drinking water or position changes.  Low, dull back pain that does not go away  Feeling unusually dizzy or tired  Pressure in your pelvis that feels like your baby is pushing down  A gush or constant trickle of watery or bloody fluid leaking from your vagina  Cramps in your lower belly that come and go or are constant  Little to no movement felt by baby in 2 hours. Your baby should be moving at least 10 times every 2 hours.  Headache that does not go away; blurry vision; seeing spots or halos; increase in swelling in your hands, feet, or face; and pain under your ribs on the right side  Vaginal bleeding with or without pain  Fever of 100.4°F (38°C) or higher  After a car accident, fall, or any trauma to your belly  Having thoughts of harming yourself or others, or do not feel safe at home  Where can I learn more?   American Academy of Family Physicians  https://[email or web address]/changes-in-your-body-during-pregnancy-second-trimester/   American Academy of Family Physicians  https://[email or web address]/changes-in-your-body-during-pregnancy-third-trimester/   [employer]  http://[email or web address]/en/parents/pregnancy-calendar-intro.html?[email or web address]=p-[email or web address]   Office on Womens Health  https://www.womenshealth.gov/pregnancy/youre-pregnant-now-what/stages-pregnancy   Last Reviewed Date   2020-05-06  Consumer Information Use and Disclaimer   This information is not specific medical advice and does not replace information you receive from your health care provider. This is only a brief summary of general information. It does NOT include all information about conditions, illnesses, injuries, tests, procedures, treatments, therapies, discharge instructions or life-style choices that may apply to you. You must talk with your health care provider for complete information about your health and treatment options. This information should not be used to decide whether or not to accept your health  care providers advice, instructions or recommendations. Only your health care provider has the knowledge and training to provide advice that is right for you.  Copyright   Copyright © 2021 [employer] and its affiliates and/or licensors. All rights reserved.  Patient Education       Fetal Movement   About this topic   Feeling your baby move for the first time is a good sign that your baby is doing well. You may begin to feel these movements between the 18th and 25th weeks of your pregnancy. If this is your first time being pregnant, it may be closer to 25 weeks. Your baby has been moving around before this, but the kicks have not been strong enough for you to feel. During the first weeks of feeling movement, you may start to see a pattern during the day when your baby is most active. You can track your baby's kicks each day at home. This is also known as kick counting. It is a good way to check on your baby's movements and well being.  Most often, fetal kick counting is used in high-risk pregnancies. It may be useful for all pregnancies. Counting and writing down your baby's kicks, jabs, twists, flutters, rolls, turns, flips, and swishes may help find a problem that needs more evaluation. The American College of Obstetricians and Gynecologists, or ACOG, suggests that you record how much time it takes you to feel 10 of these movements. Ideally, you should be able to feel 10 movements within 2 hours. Many people will track these movements in much less time.  General   How to Track Your Baby's Kick Counts   Most often your doctor will want you to wait until the 28th to 30th weeks of your pregnancy to start kick counting. Here are some tips to help you get started.  Find the time of day when your baby is most active. For some people, this is right after eating. Others find their baby moving a lot after they have been exercising or more active. Some babies are more active in the evenings when your blood sugar starts  "to lower.  Try to count kicks at about the same time each day.  Before you start counting, have something to eat or drink. Also take a short walk or do some light activity.  Choose a quiet place where you can focus on your baby's movements. Also get in a comfortable position. Try and lie on one side or the other. You may need to change positions until you find one that works best for you and your baby.  Keep a notebook to track your baby's kicks. Your doctor may give you a chart to use or you can make your own. Write down the date, time you started counting, and the time of each "kick" during a 2-hour period until you have felt 10 kicks.  Once you have recorded 10 kicks within 2 hours you can stop counting.  If you are not able to record 10 movements over 2 hours you should get up and move around or eat something and try again.  If you are not able to record 10 movements over 2 hours the second time, call your doctor. They may want you to go to the hospital to get your baby checked.  When do I need to call the doctor?   You have felt less than 10 movements over a period of 2 hours.  It takes longer each day to record 10 movements.  There is a big change in the pattern of movements you are writing down.  You feel no movement for 2 hours even after eating a snack, light activity, and position changes.  Teach Back: Helping You Understand   The Teach Back Method helps you understand the information we are giving you. After you talk with the staff, tell them in your own words what you learned. This helps to make sure the staff has described each thing clearly. It also helps to explain things that may have been confusing. Before going home, make sure you can do these:  I can tell you about feeling my baby move.  I can tell you how I will track my babys kicks.  I can tell you what I will do if I feel less than 10 movements in 2 hours, it takes longer to feel my baby move 10 times, or there is a big change in how my baby " is moving.  Last Reviewed Date   2021-10-08  Consumer Information Use and Disclaimer   This information is not specific medical advice and does not replace information you receive from your health care provider. This is only a brief summary of general information. It does NOT include all information about conditions, illnesses, injuries, tests, procedures, treatments, therapies, discharge instructions or life-style choices that may apply to you. You must talk with your health care provider for complete information about your health and treatment options. This information should not be used to decide whether or not to accept your health care providers advice, instructions or recommendations. Only your health care provider has the knowledge and training to provide advice that is right for you.  Copyright   Copyright © 2021 [employer] and its affiliates and/or licensors. All rights reserved.

## 2023-02-22 ENCOUNTER — ROUTINE PRENATAL (OUTPATIENT)
Dept: OBSTETRICS AND GYNECOLOGY | Facility: CLINIC | Age: 38
End: 2023-02-22
Payer: MEDICAID

## 2023-02-22 VITALS — DIASTOLIC BLOOD PRESSURE: 78 MMHG | WEIGHT: 293 LBS | BODY MASS INDEX: 43.89 KG/M2 | SYSTOLIC BLOOD PRESSURE: 122 MMHG

## 2023-02-22 DIAGNOSIS — O09.529 ANTEPARTUM MULTIGRAVIDA OF ADVANCED MATERNAL AGE: Primary | ICD-10-CM

## 2023-02-22 DIAGNOSIS — O09.893 PRIOR PREGNANCY COMPLICATED BY PIH, ANTEPARTUM, THIRD TRIMESTER: ICD-10-CM

## 2023-02-22 DIAGNOSIS — Z36.89 ENCOUNTER FOR ULTRASOUND TO ASSESS INTERVAL GROWTH OF FETUS: ICD-10-CM

## 2023-02-22 DIAGNOSIS — O09.40 GRAND MULTIPARITY, WITH CURRENT PREGNANCY, ANTEPARTUM: ICD-10-CM

## 2023-02-22 PROCEDURE — 99999 PR PBB SHADOW E&M-EST. PATIENT-LVL II: CPT | Mod: PBBFAC,,, | Performed by: ADVANCED PRACTICE MIDWIFE

## 2023-02-22 PROCEDURE — 99999 PR PBB SHADOW E&M-EST. PATIENT-LVL II: ICD-10-PCS | Mod: PBBFAC,,, | Performed by: ADVANCED PRACTICE MIDWIFE

## 2023-02-22 PROCEDURE — 99213 PR OFFICE/OUTPT VISIT, EST, LEVL III, 20-29 MIN: ICD-10-PCS | Mod: TH,S$PBB,, | Performed by: ADVANCED PRACTICE MIDWIFE

## 2023-02-22 PROCEDURE — 99213 OFFICE O/P EST LOW 20 MIN: CPT | Mod: TH,S$PBB,, | Performed by: ADVANCED PRACTICE MIDWIFE

## 2023-02-22 PROCEDURE — 99212 OFFICE O/P EST SF 10 MIN: CPT | Mod: PBBFAC,TH,PN | Performed by: ADVANCED PRACTICE MIDWIFE

## 2023-02-22 NOTE — PROGRESS NOTES
37 y.o. female  at 34w0d   Reports + FM, denies VB, LOF or regular CTX  Doing well without concerns   TW lbs   GBS  Next visit  Reviewed warning signs, normal FKCs, labor precautions and how/when to call.  RTC x 2 wks, call or present sooner prn.     I spent a total of 20 minutes on the day of the visit.This includes face to face time and non-face to face time preparing to see the patient (eg, review of tests), Obtaining and/or reviewing separately obtained history, Documenting clinical information in the electronic or other health record, Independently interpreting resultsand communicating results to the patient/family/caregiver, or Care coordination.

## 2023-02-22 NOTE — PATIENT INSTRUCTIONS
Patient Education       Pregnancy - The Eighth Month   About this topic   It is important for you to learn how to take care of yourself to help you have a healthy baby and safe delivery. It is good to have health care throughout your pregnancy.  The eighth month of your pregnancy starts around week 33 and lasts through week 36. By knowing how far along you are, you can learn what is normal for this stage of your pregnancy and plan for what is next.  General   Your body   During the eighth month of your pregnancy, here are some things you can expect.  You may:  Be feeling more tired. Rest as much as you can and take small naps if possible. This also helps with swollen feet and ankles.  Feel hot all the time. Be sure to drink plenty of water.  Notice your baby drops more into your pelvis. This makes breathing a bit easier, but you may have to go to the bathroom more often. You may also notice more problems with hemorrhoids.  Have more back pain  Notice clear jelly-like substance flecked with blood when you use the restroom. This is your mucus plug.  Feel less steady on your feet. This is because your hips and joints are preparing for birth.  Be tested for Group Beta Strep (GBS) to see if you will need antibiotics during labor  Gain about 1/2 to 1 pound (.23 to .45 kg) a week for the rest of your pregnancy. It is normal to gain about 5 to 10 pounds (2.3 to 4.5 kg) total in your first 4 months.  Have a BPP, or Biophysical Profile. The doctors do an ultrasound to check your babys health if you are at high risk or having problems.  Have a NST, or Non Stress Test. The staff place special monitors around your belly to check the babys heart rate and look for contractions.  Your baby's growth and development:  Your baby is almost fully mature but will spend the rest of the time inside of you getting bigger.  Their lungs are the last organ to mature, so it is important that your baby stays in your womb until your due date if  possible.  Their bones are getting stronger each day. The bones in their skull stay a little softer to make delivery easier.  They are able to scratch themselves as their fingernails are developed.  Your baby is becoming protected from germs as they develop antibodies.  They are moving a little less because there is less room.  Your baby is about 19 inches (48 cm) long and weighs about 6 pounds (2,700 gm). Your baby is about the size of a papaya or pineapple.  Things to Think About   Avoid alcohol, drugs, tobacco products, and second hand smoke.  Be sure you know the signs of labor and when to call your doctor.  Are you planning a natural childbirth or thinking about an epidural? Know things you can do to help cope with labor pain.  You may want to learn about cord blood banking.  Do you have everything you need for after the baby is born? Be sure the car seat fits in the car.  When do I need to call the doctor?   Contractions every 10 minutes or more often that do not go away with drinking water or position changes  Headache that does not go away; blurry vision; seeing spots or halos; increase in swelling in your hands, feet, or face; and pain under your ribs on the right side  Low, dull back pain that does not go away  Pressure in your pelvis that feels like your baby is pushing down  A gush or constant trickle of watery or bloody fluid leaking from your vagina  Little to no movement felt by baby in 2 hours. Your baby should be moving at least 10 times every 2 hours.  Vaginal bleeding with or without pain  Fever of 100.4°F (38°C) or higher  After a car accident, fall, or any trauma to your belly  Having thoughts of harming yourself or others, or do not feel safe at home  Where can I learn more?   American Academy of Family Physicians  https://familydoctor.org/changes-in-your-body-during-pregnancy-third-trimester/   [employer]  http://[email or web address]/en/parents/pregnancy-calendar-intro.html?[email or web address]=p-[email or web address]   Office on  Womens Health  https://www.womenshealth.gov/pregnancy/youre-pregnant-now-what/stages-pregnancy   Last Reviewed Date   2020-05-06  Consumer Information Use and Disclaimer   This information is not specific medical advice and does not replace information you receive from your health care provider. This is only a brief summary of general information. It does NOT include all information about conditions, illnesses, injuries, tests, procedures, treatments, therapies, discharge instructions or life-style choices that may apply to you. You must talk with your health care provider for complete information about your health and treatment options. This information should not be used to decide whether or not to accept your health care providers advice, instructions or recommendations. Only your health care provider has the knowledge and training to provide advice that is right for you.  Copyright   Copyright © 2021 [employer], [employer]. and its affiliates and/or licensors. All rights reserved.  Patient Education       Fetal Movement   About this topic   Feeling your baby move for the first time is a good sign that your baby is doing well. You may begin to feel these movements between the 18th and 25th weeks of your pregnancy. If this is your first time being pregnant, it may be closer to 25 weeks. Your baby has been moving around before this, but the kicks have not been strong enough for you to feel. During the first weeks of feeling movement, you may start to see a pattern during the day when your baby is most active. You can track your baby's kicks each day at home. This is also known as kick counting. It is a good way to check on your baby's movements and well being.  Most often, fetal kick counting is used in high-risk pregnancies. It may be useful for all pregnancies. Counting and writing down your baby's kicks, jabs, twists, flutters, rolls, turns, flips, and swishes may help find a problem that needs more evaluation. The  "American College of Obstetricians and Gynecologists, or ACOG, suggests that you record how much time it takes you to feel 10 of these movements. Ideally, you should be able to feel 10 movements within 2 hours. Many people will track these movements in much less time.  General   How to Track Your Baby's Kick Counts   Most often your doctor will want you to wait until the 28th to 30th weeks of your pregnancy to start kick counting. Here are some tips to help you get started.  Find the time of day when your baby is most active. For some people, this is right after eating. Others find their baby moving a lot after they have been exercising or more active. Some babies are more active in the evenings when your blood sugar starts to lower.  Try to count kicks at about the same time each day.  Before you start counting, have something to eat or drink. Also take a short walk or do some light activity.  Choose a quiet place where you can focus on your baby's movements. Also get in a comfortable position. Try and lie on one side or the other. You may need to change positions until you find one that works best for you and your baby.  Keep a notebook to track your baby's kicks. Your doctor may give you a chart to use or you can make your own. Write down the date, time you started counting, and the time of each "kick" during a 2-hour period until you have felt 10 kicks.  Once you have recorded 10 kicks within 2 hours you can stop counting.  If you are not able to record 10 movements over 2 hours you should get up and move around or eat something and try again.  If you are not able to record 10 movements over 2 hours the second time, call your doctor. They may want you to go to the hospital to get your baby checked.  When do I need to call the doctor?   You have felt less than 10 movements over a period of 2 hours.  It takes longer each day to record 10 movements.  There is a big change in the pattern of movements you are writing " down.  You feel no movement for 2 hours even after eating a snack, light activity, and position changes.  Teach Back: Helping You Understand   The Teach Back Method helps you understand the information we are giving you. After you talk with the staff, tell them in your own words what you learned. This helps to make sure the staff has described each thing clearly. It also helps to explain things that may have been confusing. Before going home, make sure you can do these:  I can tell you about feeling my baby move.  I can tell you how I will track my babys kicks.  I can tell you what I will do if I feel less than 10 movements in 2 hours, it takes longer to feel my baby move 10 times, or there is a big change in how my baby is moving.  Last Reviewed Date   2021-10-08  Consumer Information Use and Disclaimer   This information is not specific medical advice and does not replace information you receive from your health care provider. This is only a brief summary of general information. It does NOT include all information about conditions, illnesses, injuries, tests, procedures, treatments, therapies, discharge instructions or life-style choices that may apply to you. You must talk with your health care provider for complete information about your health and treatment options. This information should not be used to decide whether or not to accept your health care providers advice, instructions or recommendations. Only your health care provider has the knowledge and training to provide advice that is right for you.  Copyright   Copyright © 2021 [employer] and its affiliates and/or licensors. All rights reserved.

## 2023-03-01 ENCOUNTER — PATIENT MESSAGE (OUTPATIENT)
Dept: OTHER | Facility: OTHER | Age: 38
End: 2023-03-01
Payer: MEDICAID

## 2023-03-07 ENCOUNTER — PATIENT MESSAGE (OUTPATIENT)
Dept: OBSTETRICS AND GYNECOLOGY | Facility: CLINIC | Age: 38
End: 2023-03-07
Payer: MEDICAID

## 2023-03-08 ENCOUNTER — PROCEDURE VISIT (OUTPATIENT)
Dept: OBSTETRICS AND GYNECOLOGY | Facility: CLINIC | Age: 38
End: 2023-03-08
Payer: MEDICAID

## 2023-03-08 ENCOUNTER — ROUTINE PRENATAL (OUTPATIENT)
Dept: OBSTETRICS AND GYNECOLOGY | Facility: CLINIC | Age: 38
End: 2023-03-08
Payer: MEDICAID

## 2023-03-08 VITALS — BODY MASS INDEX: 44.49 KG/M2 | WEIGHT: 293 LBS | SYSTOLIC BLOOD PRESSURE: 124 MMHG | DIASTOLIC BLOOD PRESSURE: 78 MMHG

## 2023-03-08 DIAGNOSIS — O09.40 GRAND MULTIPARITY, WITH CURRENT PREGNANCY, ANTEPARTUM: ICD-10-CM

## 2023-03-08 DIAGNOSIS — O09.529 ANTEPARTUM MULTIGRAVIDA OF ADVANCED MATERNAL AGE: Primary | ICD-10-CM

## 2023-03-08 DIAGNOSIS — O09.529 ANTEPARTUM MULTIGRAVIDA OF ADVANCED MATERNAL AGE: ICD-10-CM

## 2023-03-08 PROCEDURE — 76819 US OB/GYN PROCEDURE (VIEWPOINT): ICD-10-PCS | Mod: 26,S$PBB,, | Performed by: OBSTETRICS & GYNECOLOGY

## 2023-03-08 PROCEDURE — 99999 PR PBB SHADOW E&M-EST. PATIENT-LVL II: ICD-10-PCS | Mod: PBBFAC,,, | Performed by: ADVANCED PRACTICE MIDWIFE

## 2023-03-08 PROCEDURE — 99212 OFFICE O/P EST SF 10 MIN: CPT | Mod: PBBFAC,TH,PN,25 | Performed by: ADVANCED PRACTICE MIDWIFE

## 2023-03-08 PROCEDURE — 99999 PR PBB SHADOW E&M-EST. PATIENT-LVL II: CPT | Mod: PBBFAC,,, | Performed by: ADVANCED PRACTICE MIDWIFE

## 2023-03-08 PROCEDURE — 99213 PR OFFICE/OUTPT VISIT, EST, LEVL III, 20-29 MIN: ICD-10-PCS | Mod: TH,S$PBB,, | Performed by: ADVANCED PRACTICE MIDWIFE

## 2023-03-08 PROCEDURE — 76816 OB US FOLLOW-UP PER FETUS: CPT | Mod: PBBFAC,PN | Performed by: OBSTETRICS & GYNECOLOGY

## 2023-03-08 PROCEDURE — 99213 OFFICE O/P EST LOW 20 MIN: CPT | Mod: TH,S$PBB,, | Performed by: ADVANCED PRACTICE MIDWIFE

## 2023-03-08 PROCEDURE — 87081 CULTURE SCREEN ONLY: CPT | Performed by: ADVANCED PRACTICE MIDWIFE

## 2023-03-08 PROCEDURE — 76816 US OB/GYN PROCEDURE (VIEWPOINT): ICD-10-PCS | Mod: 26,S$PBB,, | Performed by: OBSTETRICS & GYNECOLOGY

## 2023-03-08 PROCEDURE — 76819 FETAL BIOPHYS PROFIL W/O NST: CPT | Mod: 26,S$PBB,, | Performed by: OBSTETRICS & GYNECOLOGY

## 2023-03-08 NOTE — PATIENT INSTRUCTIONS
Patient Education       Pregnancy - The Ninth Month   About this topic   It is important for you to learn how to take care of yourself to help you have a healthy baby and safe delivery. It is good to have health care throughout your pregnancy.  The ninth month of your pregnancy starts around week 37 and lasts through delivery. By knowing how far along you are, you can learn what is normal for this stage of your pregnancy and plan for what is next.  General   Your body   During the ninth month of your pregnancy, here are some things you can expect.  You may:  Lose your mucous plug as your cervix starts to get thinner and dilate.  Notice a small amount of streaky red or pink spotting.  Your doctor may discuss stripping your membranes to help the labor progress.  Go into labor any time. Most women deliver their baby between 38 and 42 weeks.  Notice your belly button sticks out. It should go back to normal after you give birth.  Have a bit of extra energy as you get ready for your baby  Notice your breasts are leaking more. This is normal as your body is making the first milk you can feed your baby.  Have tests to check on how your baby is doing. Your doctor will most likely not let you be pregnant for more than 42 weeks.  Not gain any weight this month. Some mothers even lose 1 to 2 pounds (.45 to .9 kg).  Your baby's growth and development:  Your baby has been busy swallowing fluid and building up waste products for their first bowel movement.  Your baby may start sucking their thumb.  They may come out with dry skin, bruises, or a misshapen head. Living in a watery fluid and going through labor is tough on your baby too. These are all normal and will change in the first weeks of life.  Your baby may have lots of hair on their head or not very much at all. Long fingernails are normal.  Your baby is about 20 inches (51 cm) long and weighs about 7 1/2 pounds (3,400 gm). Your baby is about the size of a  watermelon.  Things to Think About   Avoid alcohol, drugs, tobacco products, and second hand smoke.  Talk to your doctor if you plan to travel or get on a plane.  Have your bag packed so you are ready for delivery.  Are you planning a natural childbirth or thinking about an epidural? Know things you can do to help cope with labor pain.  If you are having a boy, decide if you want to have him circumcised.  Be sure the car seat is installed the right way so you are ready to bring your baby home.  When do I need to call the doctor?   Contractions every 5 minutes or more often that do not go away with rest, drinking water, or position changes  Headache that does not go away; blurry vision; seeing spots or halos; increase in swelling in your hands, feet, or face; and pain under your ribs on the right side  Low, dull back pain that does not go away  Pressure in your pelvis that feels like your baby is pushing down  A gush or constant trickle of watery or bloody fluid leaking from your vagina  Little to no movement felt by baby in 2 hours. Your baby should be moving at least 10 times every 2 hours.  Vaginal bleeding with or without pain  Fever of 100.4°F (38°C) or higher  After a car accident, fall, or any trauma to your belly  Having thoughts of harming yourself or others, or do not feel safe at home  Where can I learn more?   American Academy of Family Physicians  https://[email or web address]/changes-in-your-body-during-pregnancy-third-trimester/   Kids Health  http://kidshealth.org/en/parents/pregnancy-calendar-intro.html?WT.ac=p-barbie   Office on Womens Health  https://www.womenshealth.gov/pregnancy/youre-pregnant-now-what/stages-pregnancy   Last Reviewed Date   2020-05-06  Consumer Information Use and Disclaimer   This information is not specific medical advice and does not replace information you receive from your health care provider. This is only a brief summary of general information. It does NOT include all  information about conditions, illnesses, injuries, tests, procedures, treatments, therapies, discharge instructions or life-style choices that may apply to you. You must talk with your health care provider for complete information about your health and treatment options. This information should not be used to decide whether or not to accept your health care providers advice, instructions or recommendations. Only your health care provider has the knowledge and training to provide advice that is right for you.  Copyright   Copyright © 2021 [employer] and its affiliates and/or licensors. All rights reserved.  Patient Education       Fetal Movement   About this topic   Feeling your baby move for the first time is a good sign that your baby is doing well. You may begin to feel these movements between the 18th and 25th weeks of your pregnancy. If this is your first time being pregnant, it may be closer to 25 weeks. Your baby has been moving around before this, but the kicks have not been strong enough for you to feel. During the first weeks of feeling movement, you may start to see a pattern during the day when your baby is most active. You can track your baby's kicks each day at home. This is also known as kick counting. It is a good way to check on your baby's movements and well being.  Most often, fetal kick counting is used in high-risk pregnancies. It may be useful for all pregnancies. Counting and writing down your baby's kicks, jabs, twists, flutters, rolls, turns, flips, and swishes may help find a problem that needs more evaluation. The American College of Obstetricians and Gynecologists, or ACOG, suggests that you record how much time it takes you to feel 10 of these movements. Ideally, you should be able to feel 10 movements within 2 hours. Many people will track these movements in much less time.  General   How to Track Your Baby's Kick Counts   Most often your doctor will want you to wait until the 28th  "to 30th weeks of your pregnancy to start kick counting. Here are some tips to help you get started.  Find the time of day when your baby is most active. For some people, this is right after eating. Others find their baby moving a lot after they have been exercising or more active. Some babies are more active in the evenings when your blood sugar starts to lower.  Try to count kicks at about the same time each day.  Before you start counting, have something to eat or drink. Also take a short walk or do some light activity.  Choose a quiet place where you can focus on your baby's movements. Also get in a comfortable position. Try and lie on one side or the other. You may need to change positions until you find one that works best for you and your baby.  Keep a notebook to track your baby's kicks. Your doctor may give you a chart to use or you can make your own. Write down the date, time you started counting, and the time of each "kick" during a 2-hour period until you have felt 10 kicks.  Once you have recorded 10 kicks within 2 hours you can stop counting.  If you are not able to record 10 movements over 2 hours you should get up and move around or eat something and try again.  If you are not able to record 10 movements over 2 hours the second time, call your doctor. They may want you to go to the hospital to get your baby checked.  When do I need to call the doctor?   You have felt less than 10 movements over a period of 2 hours.  It takes longer each day to record 10 movements.  There is a big change in the pattern of movements you are writing down.  You feel no movement for 2 hours even after eating a snack, light activity, and position changes.  Teach Back: Helping You Understand   The Teach Back Method helps you understand the information we are giving you. After you talk with the staff, tell them in your own words what you learned. This helps to make sure the staff has described each thing clearly. It also " helps to explain things that may have been confusing. Before going home, make sure you can do these:  I can tell you about feeling my baby move.  I can tell you how I will track my babys kicks.  I can tell you what I will do if I feel less than 10 movements in 2 hours, it takes longer to feel my baby move 10 times, or there is a big change in how my baby is moving.  Last Reviewed Date   2021-10-08  Consumer Information Use and Disclaimer   This information is not specific medical advice and does not replace information you receive from your health care provider. This is only a brief summary of general information. It does NOT include all information about conditions, illnesses, injuries, tests, procedures, treatments, therapies, discharge instructions or life-style choices that may apply to you. You must talk with your health care provider for complete information about your health and treatment options. This information should not be used to decide whether or not to accept your health care providers advice, instructions or recommendations. Only your health care provider has the knowledge and training to provide advice that is right for you.  Copyright   Copyright © 2021 [employer] and its affiliates and/or licensors. All rights reserved.

## 2023-03-08 NOTE — PROGRESS NOTES
38 y.o. female  at 36w0d  Reports + FM, denies VB, LOF or regular CTX  Doing well without concerns     AMA ultrasound today-vertex, MVP 5.7 cm, EFW 6 lb 4 oz at 38 percentile with BPP 8 8 and AC at 39 percentile.  Reassuring    History of PIH-normotensive today    TW lbs   GBS collected today cervix is posterior starting to soften.  No presenting part in pelvis appreciated ultrasound to confirm presentation  The skin of the suprapubic region was evaluated and appears clean and dry.  Counseled the patient to shower daily and to wash this area with an antibacterial soap such as Dial daily.  Advised her to not shave the hair from this area from now until after delivery.  I also counseled the patient to place antibacterial hand soap in all her bathrooms and kitchen at home to help facilitate proper hand hygiene practices before and after delivery.   Reviewed warning signs, normal FKCs, labor precautions and how/when to call.  RTC x 1 wks, call or present sooner prn.     I spent a total of 20 minutes on the day of the visit.This includes face to face time and non-face to face time preparing to see the patient (eg, review of tests), Obtaining and/or reviewing separately obtained history, Documenting clinical information in the electronic or other health record, Independently interpreting resultsand communicating results to the patient/family/caregiver, or Care coordination.

## 2023-03-11 LAB — BACTERIA SPEC AEROBE CULT: NORMAL

## 2023-03-15 ENCOUNTER — ROUTINE PRENATAL (OUTPATIENT)
Dept: OBSTETRICS AND GYNECOLOGY | Facility: CLINIC | Age: 38
End: 2023-03-15
Payer: MEDICAID

## 2023-03-15 ENCOUNTER — LAB VISIT (OUTPATIENT)
Dept: LAB | Facility: HOSPITAL | Age: 38
End: 2023-03-15
Attending: ADVANCED PRACTICE MIDWIFE
Payer: MEDICAID

## 2023-03-15 VITALS — DIASTOLIC BLOOD PRESSURE: 90 MMHG | BODY MASS INDEX: 44.72 KG/M2 | SYSTOLIC BLOOD PRESSURE: 138 MMHG | WEIGHT: 293 LBS

## 2023-03-15 DIAGNOSIS — O16.3 ELEVATED BLOOD PRESSURE AFFECTING PREGNANCY IN THIRD TRIMESTER, ANTEPARTUM: ICD-10-CM

## 2023-03-15 DIAGNOSIS — O09.40 GRAND MULTIPARITY, WITH CURRENT PREGNANCY, ANTEPARTUM: ICD-10-CM

## 2023-03-15 DIAGNOSIS — O36.8390 BRADYCARDIA FOUND ON MEASUREMENT OF BASELINE FETAL HEART RATE: Primary | ICD-10-CM

## 2023-03-15 DIAGNOSIS — O09.529 ANTEPARTUM MULTIGRAVIDA OF ADVANCED MATERNAL AGE: ICD-10-CM

## 2023-03-15 DIAGNOSIS — O99.210 OBESITY AFFECTING PREGNANCY, ANTEPARTUM: ICD-10-CM

## 2023-03-15 LAB
BASOPHILS # BLD AUTO: 0.02 K/UL (ref 0–0.2)
BASOPHILS NFR BLD: 0.4 % (ref 0–1.9)
CREAT UR-MCNC: 244.6 MG/DL (ref 15–325)
DIFFERENTIAL METHOD: ABNORMAL
EOSINOPHIL # BLD AUTO: 0.1 K/UL (ref 0–0.5)
EOSINOPHIL NFR BLD: 1.4 % (ref 0–8)
ERYTHROCYTE [DISTWIDTH] IN BLOOD BY AUTOMATED COUNT: 14.6 % (ref 11.5–14.5)
HCT VFR BLD AUTO: 39.4 % (ref 37–48.5)
HGB BLD-MCNC: 12.6 G/DL (ref 12–16)
IMM GRANULOCYTES # BLD AUTO: 0.01 K/UL (ref 0–0.04)
IMM GRANULOCYTES NFR BLD AUTO: 0.2 % (ref 0–0.5)
LYMPHOCYTES # BLD AUTO: 1.5 K/UL (ref 1–4.8)
LYMPHOCYTES NFR BLD: 30.4 % (ref 18–48)
MCH RBC QN AUTO: 31 PG (ref 27–31)
MCHC RBC AUTO-ENTMCNC: 32 G/DL (ref 32–36)
MCV RBC AUTO: 97 FL (ref 82–98)
MONOCYTES # BLD AUTO: 0.4 K/UL (ref 0.3–1)
MONOCYTES NFR BLD: 8.8 % (ref 4–15)
NEUTROPHILS # BLD AUTO: 2.9 K/UL (ref 1.8–7.7)
NEUTROPHILS NFR BLD: 58.8 % (ref 38–73)
NRBC BLD-RTO: 0 /100 WBC
PLATELET # BLD AUTO: 288 K/UL (ref 150–450)
PMV BLD AUTO: 11.1 FL (ref 9.2–12.9)
PROT UR-MCNC: 42 MG/DL (ref 0–15)
PROT/CREAT UR: 0.17 MG/G{CREAT} (ref 0–0.2)
RBC # BLD AUTO: 4.06 M/UL (ref 4–5.4)
WBC # BLD AUTO: 4.87 K/UL (ref 3.9–12.7)

## 2023-03-15 PROCEDURE — 99213 OFFICE O/P EST LOW 20 MIN: CPT | Mod: TH,S$PBB,, | Performed by: ADVANCED PRACTICE MIDWIFE

## 2023-03-15 PROCEDURE — 76819 US OB/GYN PROCEDURE (VIEWPOINT): ICD-10-PCS | Mod: 26,S$PBB,, | Performed by: OBSTETRICS & GYNECOLOGY

## 2023-03-15 PROCEDURE — 99213 OFFICE O/P EST LOW 20 MIN: CPT | Mod: PBBFAC,TH,PN,25 | Performed by: ADVANCED PRACTICE MIDWIFE

## 2023-03-15 PROCEDURE — 99213 PR OFFICE/OUTPT VISIT, EST, LEVL III, 20-29 MIN: ICD-10-PCS | Mod: TH,S$PBB,, | Performed by: ADVANCED PRACTICE MIDWIFE

## 2023-03-15 PROCEDURE — 99999 PR PBB SHADOW E&M-EST. PATIENT-LVL III: CPT | Mod: PBBFAC,,, | Performed by: ADVANCED PRACTICE MIDWIFE

## 2023-03-15 PROCEDURE — 99999 PR PBB SHADOW E&M-EST. PATIENT-LVL III: ICD-10-PCS | Mod: PBBFAC,,, | Performed by: ADVANCED PRACTICE MIDWIFE

## 2023-03-15 PROCEDURE — 85025 COMPLETE CBC W/AUTO DIFF WBC: CPT | Performed by: ADVANCED PRACTICE MIDWIFE

## 2023-03-15 PROCEDURE — 76819 FETAL BIOPHYS PROFIL W/O NST: CPT | Mod: PBBFAC,PN | Performed by: OBSTETRICS & GYNECOLOGY

## 2023-03-15 PROCEDURE — 36415 COLL VENOUS BLD VENIPUNCTURE: CPT | Mod: PN | Performed by: ADVANCED PRACTICE MIDWIFE

## 2023-03-15 PROCEDURE — 82570 ASSAY OF URINE CREATININE: CPT | Performed by: ADVANCED PRACTICE MIDWIFE

## 2023-03-15 PROCEDURE — 80053 COMPREHEN METABOLIC PANEL: CPT | Performed by: ADVANCED PRACTICE MIDWIFE

## 2023-03-15 NOTE — PROGRESS NOTES
38 y.o. female  at 37w0d   Reports + FM, denies VB, LOF or regular CTX  Doing well without concerns     AMA-ultrasound today-vertex, MVP 7.3 cm with ENE 21.8 cm.  BPP 8 8,  A baseline bradycardia of 116 noted on initial auscultation but on ultrasound heart rate ranged between 120-131 beats per minute during scan-reassuring.  Patient states good fetal movement    History of PIH.  Elevated blood pressure of 138/90 with 1+ protein in urine.  Asymptomatic with 2+ DTRs.    PIH precautions reviewed and PIH labs sent today.  Advised to rest and will follow up per results.  Also advised if any symptoms to contact us immediately   Weekly ultrasound and visit until delivery    TW lbs   Reviewed warning signs, normal FKCs, labor precautions and how/when to call.  RTC x 1 wks, call or present sooner prn.     I spent a total of 20 minutes on the day of the visit.This includes face to face time and non-face to face time preparing to see the patient (eg, review of tests), Obtaining and/or reviewing separately obtained history, Documenting clinical information in the electronic or other health record, Independently interpreting resultsand communicating results to the patient/family/caregiver, or Care coordination.

## 2023-03-16 ENCOUNTER — TELEPHONE (OUTPATIENT)
Dept: OBSTETRICS AND GYNECOLOGY | Facility: CLINIC | Age: 38
End: 2023-03-16
Payer: MEDICAID

## 2023-03-16 LAB
ALBUMIN SERPL BCP-MCNC: 2.8 G/DL (ref 3.5–5.2)
ALP SERPL-CCNC: 105 U/L (ref 55–135)
ALT SERPL W/O P-5'-P-CCNC: 11 U/L (ref 10–44)
ANION GAP SERPL CALC-SCNC: 9 MMOL/L (ref 8–16)
AST SERPL-CCNC: 21 U/L (ref 10–40)
BILIRUB SERPL-MCNC: 0.3 MG/DL (ref 0.1–1)
BUN SERPL-MCNC: 4 MG/DL (ref 6–20)
CALCIUM SERPL-MCNC: 9 MG/DL (ref 8.7–10.5)
CHLORIDE SERPL-SCNC: 107 MMOL/L (ref 95–110)
CO2 SERPL-SCNC: 23 MMOL/L (ref 23–29)
CREAT SERPL-MCNC: 0.7 MG/DL (ref 0.5–1.4)
EST. GFR  (NO RACE VARIABLE): >60 ML/MIN/1.73 M^2
GLUCOSE SERPL-MCNC: 70 MG/DL (ref 70–110)
POTASSIUM SERPL-SCNC: 3.7 MMOL/L (ref 3.5–5.1)
PROT SERPL-MCNC: 7 G/DL (ref 6–8.4)
SODIUM SERPL-SCNC: 139 MMOL/L (ref 136–145)

## 2023-03-16 NOTE — TELEPHONE ENCOUNTER
Called with PIH labs which were PCR 0.17 and unremarkable blood work.  Advised to continue doing daily blood pressures and observe and PIH precautions informing us of any worry or concern.  Blood pressure this morning was 100/87

## 2023-03-16 NOTE — PATIENT INSTRUCTIONS
Patient Education       Pregnancy - The Ninth Month   About this topic   It is important for you to learn how to take care of yourself to help you have a healthy baby and safe delivery. It is good to have health care throughout your pregnancy.  The ninth month of your pregnancy starts around week 37 and lasts through delivery. By knowing how far along you are, you can learn what is normal for this stage of your pregnancy and plan for what is next.  General   Your body   During the ninth month of your pregnancy, here are some things you can expect.  You may:  Lose your mucous plug as your cervix starts to get thinner and dilate.  Notice a small amount of streaky red or pink spotting.  Your doctor may discuss stripping your membranes to help the labor progress.  Go into labor any time. Most women deliver their baby between 38 and 42 weeks.  Notice your belly button sticks out. It should go back to normal after you give birth.  Have a bit of extra energy as you get ready for your baby  Notice your breasts are leaking more. This is normal as your body is making the first milk you can feed your baby.  Have tests to check on how your baby is doing. Your doctor will most likely not let you be pregnant for more than 42 weeks.  Not gain any weight this month. Some mothers even lose 1 to 2 pounds (.45 to .9 kg).  Your baby's growth and development:  Your baby has been busy swallowing fluid and building up waste products for their first bowel movement.  Your baby may start sucking their thumb.  They may come out with dry skin, bruises, or a misshapen head. Living in a watery fluid and going through labor is tough on your baby too. These are all normal and will change in the first weeks of life.  Your baby may have lots of hair on their head or not very much at all. Long fingernails are normal.  Your baby is about 20 inches (51 cm) long and weighs about 7 1/2 pounds (3,400 gm). Your baby is about the size of a  watermelon.  Things to Think About   Avoid alcohol, drugs, tobacco products, and second hand smoke.  Talk to your doctor if you plan to travel or get on a plane.  Have your bag packed so you are ready for delivery.  Are you planning a natural childbirth or thinking about an epidural? Know things you can do to help cope with labor pain.  If you are having a boy, decide if you want to have him circumcised.  Be sure the car seat is installed the right way so you are ready to bring your baby home.  When do I need to call the doctor?   Contractions every 5 minutes or more often that do not go away with rest, drinking water, or position changes  Headache that does not go away; blurry vision; seeing spots or halos; increase in swelling in your hands, feet, or face; and pain under your ribs on the right side  Low, dull back pain that does not go away  Pressure in your pelvis that feels like your baby is pushing down  A gush or constant trickle of watery or bloody fluid leaking from your vagina  Little to no movement felt by baby in 2 hours. Your baby should be moving at least 10 times every 2 hours.  Vaginal bleeding with or without pain  Fever of 100.4°F (38°C) or higher  After a car accident, fall, or any trauma to your belly  Having thoughts of harming yourself or others, or do not feel safe at home  Where can I learn more?   American Academy of Family Physicians  https://familydoctor.org/changes-in-your-body-during-pregnancy-third-trimester/   Kids Health  http://kidshealth.org/en/parents/pregnancy-calendar-intro.html?WT.ac=p-barbie   Office on Womens Health  https://www.womenshealth.gov/pregnancy/youre-pregnant-now-what/stages-pregnancy   Last Reviewed Date   2020-05-06  Consumer Information Use and Disclaimer   This information is not specific medical advice and does not replace information you receive from your health care provider. This is only a brief summary of general information. It does NOT include all  information about conditions, illnesses, injuries, tests, procedures, treatments, therapies, discharge instructions or life-style choices that may apply to you. You must talk with your health care provider for complete information about your health and treatment options. This information should not be used to decide whether or not to accept your health care providers advice, instructions or recommendations. Only your health care provider has the knowledge and training to provide advice that is right for you.  Copyright   Copyright © 2021 [employer] and its affiliates and/or licensors. All rights reserved.  Patient Education       Fetal Movement   About this topic   Feeling your baby move for the first time is a good sign that your baby is doing well. You may begin to feel these movements between the 18th and 25th weeks of your pregnancy. If this is your first time being pregnant, it may be closer to 25 weeks. Your baby has been moving around before this, but the kicks have not been strong enough for you to feel. During the first weeks of feeling movement, you may start to see a pattern during the day when your baby is most active. You can track your baby's kicks each day at home. This is also known as kick counting. It is a good way to check on your baby's movements and well being.  Most often, fetal kick counting is used in high-risk pregnancies. It may be useful for all pregnancies. Counting and writing down your baby's kicks, jabs, twists, flutters, rolls, turns, flips, and swishes may help find a problem that needs more evaluation. The American College of Obstetricians and Gynecologists, or ACOG, suggests that you record how much time it takes you to feel 10 of these movements. Ideally, you should be able to feel 10 movements within 2 hours. Many people will track these movements in much less time.  General   How to Track Your Baby's Kick Counts   Most often your doctor will want you to wait until the 28th  "to 30th weeks of your pregnancy to start kick counting. Here are some tips to help you get started.  Find the time of day when your baby is most active. For some people, this is right after eating. Others find their baby moving a lot after they have been exercising or more active. Some babies are more active in the evenings when your blood sugar starts to lower.  Try to count kicks at about the same time each day.  Before you start counting, have something to eat or drink. Also take a short walk or do some light activity.  Choose a quiet place where you can focus on your baby's movements. Also get in a comfortable position. Try and lie on one side or the other. You may need to change positions until you find one that works best for you and your baby.  Keep a notebook to track your baby's kicks. Your doctor may give you a chart to use or you can make your own. Write down the date, time you started counting, and the time of each "kick" during a 2-hour period until you have felt 10 kicks.  Once you have recorded 10 kicks within 2 hours you can stop counting.  If you are not able to record 10 movements over 2 hours you should get up and move around or eat something and try again.  If you are not able to record 10 movements over 2 hours the second time, call your doctor. They may want you to go to the hospital to get your baby checked.  When do I need to call the doctor?   You have felt less than 10 movements over a period of 2 hours.  It takes longer each day to record 10 movements.  There is a big change in the pattern of movements you are writing down.  You feel no movement for 2 hours even after eating a snack, light activity, and position changes.  Teach Back: Helping You Understand   The Teach Back Method helps you understand the information we are giving you. After you talk with the staff, tell them in your own words what you learned. This helps to make sure the staff has described each thing clearly. It also " helps to explain things that may have been confusing. Before going home, make sure you can do these:  I can tell you about feeling my baby move.  I can tell you how I will track my babys kicks.  I can tell you what I will do if I feel less than 10 movements in 2 hours, it takes longer to feel my baby move 10 times, or there is a big change in how my baby is moving.  Last Reviewed Date   2021-10-08  Consumer Information Use and Disclaimer   This information is not specific medical advice and does not replace information you receive from your health care provider. This is only a brief summary of general information. It does NOT include all information about conditions, illnesses, injuries, tests, procedures, treatments, therapies, discharge instructions or life-style choices that may apply to you. You must talk with your health care provider for complete information about your health and treatment options. This information should not be used to decide whether or not to accept your health care providers advice, instructions or recommendations. Only your health care provider has the knowledge and training to provide advice that is right for you.  Copyright   Copyright © 2021 [employer] and its affiliates and/or licensors. All rights reserved.

## 2023-03-20 ENCOUNTER — HOSPITAL ENCOUNTER (OUTPATIENT)
Facility: HOSPITAL | Age: 38
Discharge: HOME | End: 2023-03-21
Attending: OBSTETRICS & GYNECOLOGY | Admitting: OBSTETRICS & GYNECOLOGY
Payer: MEDICAID
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
## 2023-03-20 PROCEDURE — 59025 FETAL NON-STRESS TEST: CPT

## 2023-03-20 PROCEDURE — 59025 OBTAIN FETAL NONSTRESS TEST (NST): ICD-10-PCS | Mod: 26,S$PBB,, | Performed by: MIDWIFE

## 2023-03-20 PROCEDURE — 59025 FETAL NON-STRESS TEST: CPT | Mod: 26,S$PBB,, | Performed by: MIDWIFE

## 2023-03-20 PROCEDURE — 99211 OFF/OP EST MAY X REQ PHY/QHP: CPT | Mod: 25

## 2023-03-20 RX ORDER — ACETAMINOPHEN 500 MG
500 TABLET ORAL EVERY 6 HOURS PRN
Status: DISCONTINUED | OUTPATIENT
Start: 2023-03-20 | End: 2023-03-21 | Stop reason: HOSPADM

## 2023-03-21 VITALS — OXYGEN SATURATION: 98 % | SYSTOLIC BLOOD PRESSURE: 120 MMHG | DIASTOLIC BLOOD PRESSURE: 66 MMHG | HEART RATE: 66 BPM

## 2023-03-21 PROCEDURE — 99213 OFFICE O/P EST LOW 20 MIN: CPT | Mod: 25,TH,S$PBB, | Performed by: MIDWIFE

## 2023-03-21 PROCEDURE — 99213 PR OFFICE/OUTPT VISIT, EST, LEVL III, 20-29 MIN: ICD-10-PCS | Mod: 25,TH,S$PBB, | Performed by: MIDWIFE

## 2023-03-21 NOTE — H&P
[facility] - Labor & Delivery  Obstetrics  History & Physical    Patient Name: Pranay Malik  MRN: [id number]  Admission Date: 3/20/2023  Primary Care Provider: Primary Doctor No    Subjective:     Principal Problem:Fall    History of Present Illness:   37w5d here due to fall      Obstetric HPI:  Patient reports Intensity: mild, irregular contractions, active fetal movement, No vaginal bleeding , No loss of fluid     This pregnancy has been complicated by:  Obesity  Grand multiparity   AMA    OB History    Para Term  AB Living   9 4 4 0 4 4   SAB IAB Ectopic Multiple Live Births   4 0 0 0 4      # Outcome Date GA Lbr Drake/2nd Weight Sex Delivery Anes PTL Lv   9 Current            8 SAB 05/10/20           7 Term 18 39w6d / 00:05 3.47 kg (7 lb 10.4 oz) F Vag-Spont EPI N NOAM      Name: LEEANN ROSS      Apgar1: 9  Apgar5: 9   6 Term 02/10/15 40w0d  3.912 kg (8 lb 10 oz) F Vag-Spont EPI N NOAM   5 Term 03 40w0d 01:00 3.969 kg (8 lb 12 oz) F Vag-Spont None N NOAM   4 Term 99 40w0d 12:00 4.252 kg (9 lb 6 oz) M Vag-Spont None N NOAM   3 SAB      SAB      2 SAB            1 SAB              Past Medical History:   Diagnosis Date    Abnormal Pap smear 2005    colposcopy    Abnormal Pap smear of cervix         Asthma affecting pregnancy, antepartum 3/12/2018    Miscarriage      Past Surgical History:   Procedure Laterality Date    Right Knee         PTA Medications   Medication Sig    albuterol (PROVENTIL/VENTOLIN HFA) 90 mcg/actuation inhaler Inhale 2 puffs into the lungs every 6 (six) hours as needed.    SYMBICORT 80-4.5 mcg/actuation HFAA Inhale 1 puff into the lungs once daily.       Review of patient's allergies indicates:  No Known Allergies     Family History    None       Tobacco Use    Smoking status: Never     Passive exposure: Never    Smokeless tobacco: Never   Substance and Sexual Activity    Alcohol use: No    Drug use: No    Sexual activity: Yes      Partners: Male     Birth control/protection: None     Review of Systems   Gastrointestinal:  Positive for abdominal pain (mild cramping).   Genitourinary:  Negative for vaginal bleeding and vaginal discharge.   All other systems reviewed and are negative.   Objective:     Vital Signs (Most Recent):  Pulse: 73 (236)  BP: 120/73 (236) Vital Signs (24h Range):  Pulse:  [73] 73  BP: (120)/(73) 120/73        There is no height or weight on file to calculate BMI.    FHT: Cat 1 (reassuring)  TOCO:  Irregular, palpate mild    Physical Exam:   Constitutional: She is oriented to person, place, and time. She appears well-developed and well-nourished.    HENT:   Head: Normocephalic and atraumatic.    Eyes: Conjunctivae and EOM are normal.     Cardiovascular:  Normal rate.             Pulmonary/Chest: Effort normal. No respiratory distress.        Abdominal: Soft. She exhibits no distension. There is no abdominal tenderness.     Genitourinary:    Vagina and uterus normal.             Musculoskeletal: Normal range of motion and moves all extremeties.       Neurological: She is alert and oriented to person, place, and time.    Skin: Skin is warm and dry.    Psychiatric: She has a normal mood and affect. Her behavior is normal. Judgment and thought content normal.     Cervix: SVE per patient request  Dilation:  0  Effacement:  0%  Station: -3, ballotable   Presentation: Vertex     Significant Labs:  Lab Results   Component Value Date    GROUPTRH A POS 2022    HEPBSAG Negative 2022    STREPBCULT No Group B Streptococcus isolated 2023       I have personallly reviewed all pertinent lab results from the last 24 hours.    Assessment/Plan:     38 y.o. female  at 37w5d for:    * Fall  OB triage  Patient reports slipping on her kitchen floor at 10 PM and falling on her belly. Denies regular CTX, VB, LOF. Good fetal movement.  U/S: BPP 8/8. No signs of abruption.  Will monitoring for 6 hours  and d/c to home if FHT's remain reassuring.      Eugenia Escobar, DEANNA  Obstetrics  [facility] - Labor & Delivery

## 2023-03-21 NOTE — ASSESSMENT & PLAN NOTE
OB triage  Patient reports slipping on her kitchen floor at 10 PM and falling on her belly. Denies regular CTX, VB, LOF. Good fetal movement.  U/S: BPP 8/8. No signs of abruption.  Will monitoring for 6 hours and d/c to home if FHT's remain reassuring.   Klisyri Counseling:  I discussed with the patient the risks of Klisyri including but not limited to erythema, scaling, itching, weeping, crusting, and pain.

## 2023-03-21 NOTE — PROCEDURES
Pranay Malik is a 38 y.o. female patient.    Pulse: 73 (23)  BP: 120/73 (23)       Obtain Fetal nonstress test (NST)    Date/Time: 3/20/2023 11:15 PM  Performed by: Eugenia Escobar CNM  Authorized by: Eugenia Escobar CNM     Nonstress Test:     Variability:  6-25 BPM    Decelerations:  None    Accelerations:  15 bpm    Acoustic Stimulator: No      Baseline:  130    Contractions:  Irregular  Biophysical Profile:     Fetal Breathin    Fetal Movement:  2    Fetal Tone:  2    Fluid Volume:  2    Nonstress Test:  2    Biophysical Profile Score  (of 8):  8    Biophysical Profile Score  (of 10):  10    Nonstress Test Interpretation: reactive      Overall Impression:  Reassuring  Post-procedure:     Patient tolerance:  Patient tolerated the procedure well with no immediate complications    3/20/2023

## 2023-03-21 NOTE — DISCHARGE SUMMARY
[facility] - Labor & Delivery  Obstetrics  Discharge Summary      Patient Name: Pranay Malik  MRN: [id number]  Admission Date: 3/20/2023  Hospital Length of Stay: 0 days  Discharge Date and Time:  2023 5:25 AM  Attending Physician: Kristyn Kerns MD   Discharging Provider: Eugenia Escobar CNM   Primary Care Provider: Primary Doctor No    HPI:  37w5d here due to fall      FHT: Cat 1 (reassuring)  TOCO: Irregular     * No surgery found *     Hospital Course:   OB triage  Patient reports slipping on her kitchen floor at 10 PM and falling on her belly. Denies regular CTX, VB, LOF. Good fetal movement.  U/S: BPP 8/8. No signs of abruption.  Will monitoring for 6 hours and d/c to home if FHT's remain reassuring.           Final Active Diagnoses:    Diagnosis Date Noted POA    PRINCIPAL PROBLEM:  Fall [W19.XXXA] 2023 Yes      Problems Resolved During this Admission:            Immunizations     None          This patient has no babies on file.  Pending Diagnostic Studies:     None          Discharged Condition: good    Disposition: Home or Self Care    Follow Up:   Follow-up Information     Kerrie Neff CNM Follow up on 3/22/2023.    Specialty: Obstetrics and Gynecology  Contact information:  [id number] [place] [id number] [phone]                       Patient Instructions:   No discharge procedures on file.  Medications:  Current Discharge Medication List      CONTINUE these medications which have NOT CHANGED    Details   albuterol (PROVENTIL/VENTOLIN HFA) 90 mcg/actuation inhaler Inhale 2 puffs into the lungs every 6 (six) hours as needed.      SYMBICORT 80-4.5 mcg/actuation HFAA Inhale 1 puff into the lungs once daily.             Eugenia Escobar CNM  Obstetrics  [facility] - [facility] Delivery

## 2023-03-21 NOTE — DISCHARGE INSTRUCTIONS

## 2023-03-21 NOTE — HOSPITAL COURSE
OB triage  Patient reports slipping on her kitchen floor at 10 PM and falling on her belly. Denies regular CTX, VB, LOF. Good fetal movement.  U/S: BPP 8/8. No signs of abruption.  Will monitoring for 6 hours and d/c to home if FHT's remain reassuring.

## 2023-03-21 NOTE — SUBJECTIVE & OBJECTIVE
Obstetric HPI:  Patient reports Intensity: mild, irregular contractions, active fetal movement, No vaginal bleeding , No loss of fluid     This pregnancy has been complicated by:  Obesity  Grand multiparity   AMA    OB History    Para Term  AB Living   9 4 4 0 4 4   SAB IAB Ectopic Multiple Live Births   4 0 0 0 4      # Outcome Date GA Lbr Drake/2nd Weight Sex Delivery Anes PTL Lv   9 Current            8 SAB 05/10/20           7 Term 18 39w6d / 00:05 3.47 kg (7 lb 10.4 oz) F Vag-Spont EPI N NOAM      Name: LEEANN ROSS      Apgar1: 9  Apgar5: 9   6 Term 02/10/15 40w0d  3.912 kg (8 lb 10 oz) F Vag-Spont EPI N NOAM   5 Term 03 40w0d 01:00 3.969 kg (8 lb 12 oz) F Vag-Spont None N NOAM   4 Term 99 40w0d 12:00 4.252 kg (9 lb 6 oz) M Vag-Spont None N NOAM   3 SAB      SAB      2 SAB            1 SAB              Past Medical History:   Diagnosis Date    Abnormal Pap smear 2005    colposcopy    Abnormal Pap smear of cervix         Asthma affecting pregnancy, antepartum 3/12/2018    Miscarriage      Past Surgical History:   Procedure Laterality Date    Right Knee         PTA Medications   Medication Sig    albuterol (PROVENTIL/VENTOLIN HFA) 90 mcg/actuation inhaler Inhale 2 puffs into the lungs every 6 (six) hours as needed.    SYMBICORT 80-4.5 mcg/actuation HFAA Inhale 1 puff into the lungs once daily.       Review of patient's allergies indicates:  No Known Allergies     Family History    None       Tobacco Use    Smoking status: Never     Passive exposure: Never    Smokeless tobacco: Never   Substance and Sexual Activity    Alcohol use: No    Drug use: No    Sexual activity: Yes     Partners: Male     Birth control/protection: None     Review of Systems   Gastrointestinal:  Positive for abdominal pain (mild cramping).   Genitourinary:  Negative for vaginal bleeding and vaginal discharge.   All other systems reviewed and are negative.   Objective:     Vital Signs (Most  Recent):  Pulse: 73 (03/20/23 2246)  BP: 120/73 (03/20/23 2246) Vital Signs (24h Range):  Pulse:  [73] 73  BP: (120)/(73) 120/73        There is no height or weight on file to calculate BMI.    FHT: Cat 1 (reassuring)  TOCO:  Irregular, palpate mild    Physical Exam:   Constitutional: She is oriented to person, place, and time. She appears well-developed and well-nourished.    HENT:   Head: Normocephalic and atraumatic.    Eyes: Conjunctivae and EOM are normal.     Cardiovascular:  Normal rate.             Pulmonary/Chest: Effort normal. No respiratory distress.        Abdominal: Soft. She exhibits no distension. There is no abdominal tenderness.     Genitourinary:    Vagina and uterus normal.             Musculoskeletal: Normal range of motion and moves all extremeties.       Neurological: She is alert and oriented to person, place, and time.    Skin: Skin is warm and dry.    Psychiatric: She has a normal mood and affect. Her behavior is normal. Judgment and thought content normal.     Cervix: SVE per patient request  Dilation:  0  Effacement:  0%  Station: -3, ballotable   Presentation: Vertex     Significant Labs:  Lab Results   Component Value Date    GROUPTRH A POS 08/26/2022    HEPBSAG Negative 08/26/2022    STREPBCULT No Group B Streptococcus isolated 03/08/2023       I have personallly reviewed all pertinent lab results from the last 24 hours.

## 2023-03-22 ENCOUNTER — PROCEDURE VISIT (OUTPATIENT)
Dept: OBSTETRICS AND GYNECOLOGY | Facility: CLINIC | Age: 38
End: 2023-03-22
Payer: MEDICAID

## 2023-03-22 ENCOUNTER — LAB VISIT (OUTPATIENT)
Dept: LAB | Facility: HOSPITAL | Age: 38
End: 2023-03-22
Attending: ADVANCED PRACTICE MIDWIFE
Payer: MEDICAID

## 2023-03-22 ENCOUNTER — ROUTINE PRENATAL (OUTPATIENT)
Dept: OBSTETRICS AND GYNECOLOGY | Facility: CLINIC | Age: 38
End: 2023-03-22
Payer: MEDICAID

## 2023-03-22 VITALS — WEIGHT: 293 LBS | SYSTOLIC BLOOD PRESSURE: 124 MMHG | DIASTOLIC BLOOD PRESSURE: 82 MMHG | BODY MASS INDEX: 44.24 KG/M2

## 2023-03-22 DIAGNOSIS — R03.0 ELEVATED BLOOD PRESSURE READING: ICD-10-CM

## 2023-03-22 DIAGNOSIS — O99.210 OBESITY AFFECTING PREGNANCY, ANTEPARTUM: ICD-10-CM

## 2023-03-22 DIAGNOSIS — O16.3 ELEVATED BLOOD PRESSURE AFFECTING PREGNANCY IN THIRD TRIMESTER, ANTEPARTUM: ICD-10-CM

## 2023-03-22 DIAGNOSIS — O09.40 GRAND MULTIPARITY, WITH CURRENT PREGNANCY, ANTEPARTUM: ICD-10-CM

## 2023-03-22 DIAGNOSIS — O09.529 ANTEPARTUM MULTIGRAVIDA OF ADVANCED MATERNAL AGE: ICD-10-CM

## 2023-03-22 DIAGNOSIS — O09.529 ANTEPARTUM MULTIGRAVIDA OF ADVANCED MATERNAL AGE: Primary | ICD-10-CM

## 2023-03-22 LAB
CREAT UR-MCNC: 187.9 MG/DL (ref 15–325)
PROT UR-MCNC: 15 MG/DL (ref 0–15)
PROT/CREAT UR: 0.08 MG/G{CREAT} (ref 0–0.2)

## 2023-03-22 PROCEDURE — 99212 OFFICE O/P EST SF 10 MIN: CPT | Mod: PBBFAC,TH,PN | Performed by: ADVANCED PRACTICE MIDWIFE

## 2023-03-22 PROCEDURE — 76819 FETAL BIOPHYS PROFIL W/O NST: CPT | Mod: PBBFAC,PN | Performed by: OBSTETRICS & GYNECOLOGY

## 2023-03-22 PROCEDURE — 99999 PR PBB SHADOW E&M-EST. PATIENT-LVL II: ICD-10-PCS | Mod: PBBFAC,,, | Performed by: ADVANCED PRACTICE MIDWIFE

## 2023-03-22 PROCEDURE — 85025 COMPLETE CBC W/AUTO DIFF WBC: CPT | Performed by: ADVANCED PRACTICE MIDWIFE

## 2023-03-22 PROCEDURE — 82570 ASSAY OF URINE CREATININE: CPT | Performed by: ADVANCED PRACTICE MIDWIFE

## 2023-03-22 PROCEDURE — 76819 US OB/GYN PROCEDURE (VIEWPOINT): ICD-10-PCS | Mod: 26,S$PBB,, | Performed by: OBSTETRICS & GYNECOLOGY

## 2023-03-22 PROCEDURE — 99213 PR OFFICE/OUTPT VISIT, EST, LEVL III, 20-29 MIN: ICD-10-PCS | Mod: TH,S$PBB,, | Performed by: ADVANCED PRACTICE MIDWIFE

## 2023-03-22 PROCEDURE — 36415 COLL VENOUS BLD VENIPUNCTURE: CPT | Mod: PN | Performed by: ADVANCED PRACTICE MIDWIFE

## 2023-03-22 PROCEDURE — 80053 COMPREHEN METABOLIC PANEL: CPT | Performed by: ADVANCED PRACTICE MIDWIFE

## 2023-03-22 PROCEDURE — 99999 PR PBB SHADOW E&M-EST. PATIENT-LVL II: CPT | Mod: PBBFAC,,, | Performed by: ADVANCED PRACTICE MIDWIFE

## 2023-03-22 PROCEDURE — 99213 OFFICE O/P EST LOW 20 MIN: CPT | Mod: TH,S$PBB,, | Performed by: ADVANCED PRACTICE MIDWIFE

## 2023-03-22 NOTE — PROGRESS NOTES
38 y.o. female  at 38w0d   Reports + FM, denies VB, LOF or regular CTX  Doing well without concerns     Attended Labor and delivery on Monday after fall-denies bleeding or contractions.  Good fetal movement-ultrasound-BPP 8 8 and no evidence of abruption.  Category 1 strip with reactive NST.  Discharged home with precautions    AMA-ultrasound-vertex, MVP 7.8 cm with ENE 19.9 and BPP 8 8-reassuring    History of PIH, blood pressure is satisfactory.  States at home blood pressure is 130-150 over 80-93.  Asymptomatic  Last weeks PIH labs results were unremarkable with PCR 0.17.  Secondary to home blood pressure monitoring will repeat lab today and PIH precautions are reviewed      TWG: 15 lbs   Reviewed warning signs, normal FKCs, labor precautions and how/when to call.  RTC x 1 wks, call or present sooner prn.     I spent a total of 20 minutes on the day of the visit.This includes face to face time and non-face to face time preparing to see the patient (eg, review of tests), Obtaining and/or reviewing separately obtained history, Documenting clinical information in the electronic or other health record, Independently interpreting resultsand communicating results to the patient/family/caregiver, or Care coordination.

## 2023-03-22 NOTE — PATIENT INSTRUCTIONS
Patient Education       Pregnancy - The Ninth Month   About this topic   It is important for you to learn how to take care of yourself to help you have a healthy baby and safe delivery. It is good to have health care throughout your pregnancy.  The ninth month of your pregnancy starts around week 37 and lasts through delivery. By knowing how far along you are, you can learn what is normal for this stage of your pregnancy and plan for what is next.  General   Your body   During the ninth month of your pregnancy, here are some things you can expect.  You may:  Lose your mucous plug as your cervix starts to get thinner and dilate.  Notice a small amount of streaky red or pink spotting.  Your doctor may discuss stripping your membranes to help the labor progress.  Go into labor any time. Most women deliver their baby between 38 and 42 weeks.  Notice your belly button sticks out. It should go back to normal after you give birth.  Have a bit of extra energy as you get ready for your baby  Notice your breasts are leaking more. This is normal as your body is making the first milk you can feed your baby.  Have tests to check on how your baby is doing. Your doctor will most likely not let you be pregnant for more than 42 weeks.  Not gain any weight this month. Some mothers even lose 1 to 2 pounds (.45 to .9 kg).  Your baby's growth and development:  Your baby has been busy swallowing fluid and building up waste products for their first bowel movement.  Your baby may start sucking their thumb.  They may come out with dry skin, bruises, or a misshapen head. Living in a watery fluid and going through labor is tough on your baby too. These are all normal and will change in the first weeks of life.  Your baby may have lots of hair on their head or not very much at all. Long fingernails are normal.  Your baby is about 20 inches (51 cm) long and weighs about 7 1/2 pounds (3,400 gm). Your baby is about the size of a  watermelon.  Things to Think About   Avoid alcohol, drugs, tobacco products, and second hand smoke.  Talk to your doctor if you plan to travel or get on a plane.  Have your bag packed so you are ready for delivery.  Are you planning a natural childbirth or thinking about an epidural? Know things you can do to help cope with labor pain.  If you are having a boy, decide if you want to have him circumcised.  Be sure the car seat is installed the right way so you are ready to bring your baby home.  When do I need to call the doctor?   Contractions every 5 minutes or more often that do not go away with rest, drinking water, or position changes  Headache that does not go away; blurry vision; seeing spots or halos; increase in swelling in your hands, feet, or face; and pain under your ribs on the right side  Low, dull back pain that does not go away  Pressure in your pelvis that feels like your baby is pushing down  A gush or constant trickle of watery or bloody fluid leaking from your vagina  Little to no movement felt by baby in 2 hours. Your baby should be moving at least 10 times every 2 hours.  Vaginal bleeding with or without pain  Fever of 100.4°F (38°C) or higher  After a car accident, fall, or any trauma to your belly  Having thoughts of harming yourself or others, or do not feel safe at home  Where can I learn more?   American Academy of Family Physicians  https://familydoctor.org/changes-in-your-body-during-pregnancy-third-trimester/   Kids Health  http://kidshealth.org/en/parents/pregnancy-calendar-intro.html?WT.ac=p-barbie   Office on Womens Health  https://www.womenshealth.gov/pregnancy/youre-pregnant-now-what/stages-pregnancy   Last Reviewed Date   2020-05-06  Consumer Information Use and Disclaimer   This information is not specific medical advice and does not replace information you receive from your health care provider. This is only a brief summary of general information. It does NOT include all  information about conditions, illnesses, injuries, tests, procedures, treatments, therapies, discharge instructions or life-style choices that may apply to you. You must talk with your health care provider for complete information about your health and treatment options. This information should not be used to decide whether or not to accept your health care providers advice, instructions or recommendations. Only your health care provider has the knowledge and training to provide advice that is right for you.  Copyright   Copyright © 2021 [employer] and its affiliates and/or licensors. All rights reserved.  Patient Education       Fetal Movement   About this topic   Feeling your baby move for the first time is a good sign that your baby is doing well. You may begin to feel these movements between the 18th and 25th weeks of your pregnancy. If this is your first time being pregnant, it may be closer to 25 weeks. Your baby has been moving around before this, but the kicks have not been strong enough for you to feel. During the first weeks of feeling movement, you may start to see a pattern during the day when your baby is most active. You can track your baby's kicks each day at home. This is also known as kick counting. It is a good way to check on your baby's movements and well being.  Most often, fetal kick counting is used in high-risk pregnancies. It may be useful for all pregnancies. Counting and writing down your baby's kicks, jabs, twists, flutters, rolls, turns, flips, and swishes may help find a problem that needs more evaluation. The American College of Obstetricians and Gynecologists, or ACOG, suggests that you record how much time it takes you to feel 10 of these movements. Ideally, you should be able to feel 10 movements within 2 hours. Many people will track these movements in much less time.  General   How to Track Your Baby's Kick Counts   Most often your doctor will want you to wait until the 28th  "to 30th weeks of your pregnancy to start kick counting. Here are some tips to help you get started.  Find the time of day when your baby is most active. For some people, this is right after eating. Others find their baby moving a lot after they have been exercising or more active. Some babies are more active in the evenings when your blood sugar starts to lower.  Try to count kicks at about the same time each day.  Before you start counting, have something to eat or drink. Also take a short walk or do some light activity.  Choose a quiet place where you can focus on your baby's movements. Also get in a comfortable position. Try and lie on one side or the other. You may need to change positions until you find one that works best for you and your baby.  Keep a notebook to track your baby's kicks. Your doctor may give you a chart to use or you can make your own. Write down the date, time you started counting, and the time of each "kick" during a 2-hour period until you have felt 10 kicks.  Once you have recorded 10 kicks within 2 hours you can stop counting.  If you are not able to record 10 movements over 2 hours you should get up and move around or eat something and try again.  If you are not able to record 10 movements over 2 hours the second time, call your doctor. They may want you to go to the hospital to get your baby checked.  When do I need to call the doctor?   You have felt less than 10 movements over a period of 2 hours.  It takes longer each day to record 10 movements.  There is a big change in the pattern of movements you are writing down.  You feel no movement for 2 hours even after eating a snack, light activity, and position changes.  Teach Back: Helping You Understand   The Teach Back Method helps you understand the information we are giving you. After you talk with the staff, tell them in your own words what you learned. This helps to make sure the staff has described each thing clearly. It also " helps to explain things that may have been confusing. Before going home, make sure you can do these:  I can tell you about feeling my baby move.  I can tell you how I will track my babys kicks.  I can tell you what I will do if I feel less than 10 movements in 2 hours, it takes longer to feel my baby move 10 times, or there is a big change in how my baby is moving.  Last Reviewed Date   2021-10-08  Consumer Information Use and Disclaimer   This information is not specific medical advice and does not replace information you receive from your health care provider. This is only a brief summary of general information. It does NOT include all information about conditions, illnesses, injuries, tests, procedures, treatments, therapies, discharge instructions or life-style choices that may apply to you. You must talk with your health care provider for complete information about your health and treatment options. This information should not be used to decide whether or not to accept your health care providers advice, instructions or recommendations. Only your health care provider has the knowledge and training to provide advice that is right for you.  Copyright   Copyright © 2021 [employer] and its affiliates and/or licensors. All rights reserved.

## 2023-03-23 LAB
ALBUMIN SERPL BCP-MCNC: 2.8 G/DL (ref 3.5–5.2)
ALP SERPL-CCNC: 116 U/L (ref 55–135)
ALT SERPL W/O P-5'-P-CCNC: 11 U/L (ref 10–44)
ANION GAP SERPL CALC-SCNC: 10 MMOL/L (ref 8–16)
AST SERPL-CCNC: 20 U/L (ref 10–40)
BASOPHILS # BLD AUTO: 0.02 K/UL (ref 0–0.2)
BASOPHILS NFR BLD: 0.4 % (ref 0–1.9)
BILIRUB SERPL-MCNC: 0.3 MG/DL (ref 0.1–1)
BUN SERPL-MCNC: 5 MG/DL (ref 6–20)
CALCIUM SERPL-MCNC: 8.9 MG/DL (ref 8.7–10.5)
CHLORIDE SERPL-SCNC: 107 MMOL/L (ref 95–110)
CO2 SERPL-SCNC: 22 MMOL/L (ref 23–29)
CREAT SERPL-MCNC: 0.7 MG/DL (ref 0.5–1.4)
DIFFERENTIAL METHOD: ABNORMAL
EOSINOPHIL # BLD AUTO: 0.1 K/UL (ref 0–0.5)
EOSINOPHIL NFR BLD: 2.1 % (ref 0–8)
ERYTHROCYTE [DISTWIDTH] IN BLOOD BY AUTOMATED COUNT: 15.2 % (ref 11.5–14.5)
EST. GFR  (NO RACE VARIABLE): >60 ML/MIN/1.73 M^2
GLUCOSE SERPL-MCNC: 98 MG/DL (ref 70–110)
HCT VFR BLD AUTO: 39.5 % (ref 37–48.5)
HGB BLD-MCNC: 12.7 G/DL (ref 12–16)
IMM GRANULOCYTES # BLD AUTO: 0.01 K/UL (ref 0–0.04)
IMM GRANULOCYTES NFR BLD AUTO: 0.2 % (ref 0–0.5)
LYMPHOCYTES # BLD AUTO: 1.9 K/UL (ref 1–4.8)
LYMPHOCYTES NFR BLD: 36.1 % (ref 18–48)
MCH RBC QN AUTO: 31.5 PG (ref 27–31)
MCHC RBC AUTO-ENTMCNC: 32.2 G/DL (ref 32–36)
MCV RBC AUTO: 98 FL (ref 82–98)
MONOCYTES # BLD AUTO: 0.3 K/UL (ref 0.3–1)
MONOCYTES NFR BLD: 6.2 % (ref 4–15)
NEUTROPHILS # BLD AUTO: 2.9 K/UL (ref 1.8–7.7)
NEUTROPHILS NFR BLD: 55 % (ref 38–73)
NRBC BLD-RTO: 0 /100 WBC
PLATELET # BLD AUTO: 288 K/UL (ref 150–450)
PMV BLD AUTO: 11.2 FL (ref 9.2–12.9)
POTASSIUM SERPL-SCNC: 3.6 MMOL/L (ref 3.5–5.1)
PROT SERPL-MCNC: 6.9 G/DL (ref 6–8.4)
RBC # BLD AUTO: 4.03 M/UL (ref 4–5.4)
SODIUM SERPL-SCNC: 139 MMOL/L (ref 136–145)
WBC # BLD AUTO: 5.18 K/UL (ref 3.9–12.7)

## 2023-03-29 ENCOUNTER — HOSPITAL ENCOUNTER (INPATIENT)
Facility: HOSPITAL | Age: 38
LOS: 3 days | Discharge: HOME | End: 2023-04-01
Attending: OBSTETRICS & GYNECOLOGY | Admitting: OBSTETRICS & GYNECOLOGY
Payer: MEDICAID

## 2023-03-29 ENCOUNTER — PROCEDURE VISIT (OUTPATIENT)
Dept: OBSTETRICS AND GYNECOLOGY | Facility: CLINIC | Age: 38
End: 2023-03-29
Payer: MEDICAID

## 2023-03-29 ENCOUNTER — ROUTINE PRENATAL (OUTPATIENT)
Dept: OBSTETRICS AND GYNECOLOGY | Facility: CLINIC | Age: 38
End: 2023-03-29
Payer: MEDICAID

## 2023-03-29 VITALS — BODY MASS INDEX: 45.35 KG/M2 | WEIGHT: 293 LBS | SYSTOLIC BLOOD PRESSURE: 128 MMHG | DIASTOLIC BLOOD PRESSURE: 84 MMHG

## 2023-03-29 DIAGNOSIS — O09.529 AMA (ADVANCED MATERNAL AGE) MULTIGRAVIDA 35+: ICD-10-CM

## 2023-03-29 DIAGNOSIS — R03.0 ELEVATED BLOOD PRESSURE READING: ICD-10-CM

## 2023-03-29 DIAGNOSIS — O09.529 ANTEPARTUM MULTIGRAVIDA OF ADVANCED MATERNAL AGE: ICD-10-CM

## 2023-03-29 DIAGNOSIS — O09.529 ANTEPARTUM MULTIGRAVIDA OF ADVANCED MATERNAL AGE: Primary | ICD-10-CM

## 2023-03-29 DIAGNOSIS — R09.89 LABILE BLOOD PRESSURE: ICD-10-CM

## 2023-03-29 DIAGNOSIS — O16.3 ELEVATED BLOOD PRESSURE AFFECTING PREGNANCY IN THIRD TRIMESTER, ANTEPARTUM: ICD-10-CM

## 2023-03-29 DIAGNOSIS — R51.9 PREGNANCY HEADACHE IN THIRD TRIMESTER: ICD-10-CM

## 2023-03-29 DIAGNOSIS — O26.893 PREGNANCY HEADACHE IN THIRD TRIMESTER: ICD-10-CM

## 2023-03-29 PROBLEM — J45.20 MILD INTERMITTENT ASTHMA WITHOUT COMPLICATION: Status: ACTIVE | Noted: 2020-10-14

## 2023-03-29 LAB
ABO + RH BLD: NORMAL
ALBUMIN SERPL BCP-MCNC: 2.7 G/DL (ref 3.5–5.2)
ALP SERPL-CCNC: 111 U/L (ref 55–135)
ALT SERPL W/O P-5'-P-CCNC: 14 U/L (ref 10–44)
ANION GAP SERPL CALC-SCNC: 10 MMOL/L (ref 8–16)
AST SERPL-CCNC: 25 U/L (ref 10–40)
BASOPHILS # BLD AUTO: 0.03 K/UL (ref 0–0.2)
BASOPHILS NFR BLD: 0.6 % (ref 0–1.9)
BILIRUB SERPL-MCNC: 0.3 MG/DL (ref 0.1–1)
BLD GP AB SCN CELLS X3 SERPL QL: NORMAL
BUN SERPL-MCNC: 4 MG/DL (ref 6–20)
CALCIUM SERPL-MCNC: 8.4 MG/DL (ref 8.7–10.5)
CHLORIDE SERPL-SCNC: 109 MMOL/L (ref 95–110)
CO2 SERPL-SCNC: 20 MMOL/L (ref 23–29)
CREAT SERPL-MCNC: 0.6 MG/DL (ref 0.5–1.4)
CREAT UR-MCNC: 122.1 MG/DL (ref 15–325)
CREAT UR-MCNC: 63 MG/DL (ref 15–325)
DIFFERENTIAL METHOD: ABNORMAL
EOSINOPHIL # BLD AUTO: 0.1 K/UL (ref 0–0.5)
EOSINOPHIL NFR BLD: 1.5 % (ref 0–8)
ERYTHROCYTE [DISTWIDTH] IN BLOOD BY AUTOMATED COUNT: 14.9 % (ref 11.5–14.5)
EST. GFR  (NO RACE VARIABLE): >60 ML/MIN/1.73 M^2
GLUCOSE SERPL-MCNC: 70 MG/DL (ref 70–110)
HCT VFR BLD AUTO: 37.7 % (ref 37–48.5)
HGB BLD-MCNC: 12.4 G/DL (ref 12–16)
IMM GRANULOCYTES # BLD AUTO: 0.01 K/UL (ref 0–0.04)
IMM GRANULOCYTES NFR BLD AUTO: 0.2 % (ref 0–0.5)
LYMPHOCYTES # BLD AUTO: 1.5 K/UL (ref 1–4.8)
LYMPHOCYTES NFR BLD: 33.1 % (ref 18–48)
MCH RBC QN AUTO: 30.8 PG (ref 27–31)
MCHC RBC AUTO-ENTMCNC: 32.9 G/DL (ref 32–36)
MCV RBC AUTO: 94 FL (ref 82–98)
MONOCYTES # BLD AUTO: 0.5 K/UL (ref 0.3–1)
MONOCYTES NFR BLD: 9.7 % (ref 4–15)
NEUTROPHILS # BLD AUTO: 2.5 K/UL (ref 1.8–7.7)
NEUTROPHILS NFR BLD: 54.9 % (ref 38–73)
NRBC BLD-RTO: 0 /100 WBC
PLATELET # BLD AUTO: 240 K/UL (ref 150–450)
PMV BLD AUTO: 10.7 FL (ref 9.2–12.9)
POTASSIUM SERPL-SCNC: 3.7 MMOL/L (ref 3.5–5.1)
PROT SERPL-MCNC: 6.9 G/DL (ref 6–8.4)
PROT UR-MCNC: 13 MG/DL (ref 0–15)
PROT UR-MCNC: <7 MG/DL (ref 0–15)
PROT/CREAT UR: 0.11 MG/G{CREAT} (ref 0–0.2)
PROT/CREAT UR: NORMAL MG/G{CREAT} (ref 0–0.2)
RBC # BLD AUTO: 4.02 M/UL (ref 4–5.4)
SODIUM SERPL-SCNC: 139 MMOL/L (ref 136–145)
SPECIMEN OUTDATE: NORMAL
WBC # BLD AUTO: 4.62 K/UL (ref 3.9–12.7)

## 2023-03-29 PROCEDURE — 63600175 PHARM REV CODE 636 W HCPCS: Performed by: MIDWIFE

## 2023-03-29 PROCEDURE — 76816 US OB/GYN PROCEDURE (VIEWPOINT): ICD-10-PCS | Mod: 26,S$PBB,, | Performed by: OBSTETRICS & GYNECOLOGY

## 2023-03-29 PROCEDURE — 11000001 HC ACUTE MED/SURG PRIVATE ROOM

## 2023-03-29 PROCEDURE — 72100003 HC LABOR CARE, EA. ADDL. 8 HRS

## 2023-03-29 PROCEDURE — 82570 ASSAY OF URINE CREATININE: CPT | Mod: 91 | Performed by: MIDWIFE

## 2023-03-29 PROCEDURE — 72100002 HC LABOR CARE, 1ST 8 HOURS

## 2023-03-29 PROCEDURE — 86900 BLOOD TYPING SEROLOGIC ABO: CPT | Performed by: MIDWIFE

## 2023-03-29 PROCEDURE — 99213 PR OFFICE/OUTPT VISIT, EST, LEVL III, 20-29 MIN: ICD-10-PCS | Mod: TH,S$PBB,, | Performed by: ADVANCED PRACTICE MIDWIFE

## 2023-03-29 PROCEDURE — 76819 US OB/GYN PROCEDURE (VIEWPOINT): ICD-10-PCS | Mod: 26,S$PBB,, | Performed by: OBSTETRICS & GYNECOLOGY

## 2023-03-29 PROCEDURE — 76816 OB US FOLLOW-UP PER FETUS: CPT | Mod: PBBFAC,PN | Performed by: OBSTETRICS & GYNECOLOGY

## 2023-03-29 PROCEDURE — 80053 COMPREHEN METABOLIC PANEL: CPT | Performed by: MIDWIFE

## 2023-03-29 PROCEDURE — 99999 PR PBB SHADOW E&M-EST. PATIENT-LVL II: ICD-10-PCS | Mod: PBBFAC,,, | Performed by: ADVANCED PRACTICE MIDWIFE

## 2023-03-29 PROCEDURE — 85025 COMPLETE CBC W/AUTO DIFF WBC: CPT | Performed by: MIDWIFE

## 2023-03-29 PROCEDURE — 25000003 PHARM REV CODE 250: Performed by: MIDWIFE

## 2023-03-29 PROCEDURE — 99999 PR PBB SHADOW E&M-EST. PATIENT-LVL II: CPT | Mod: PBBFAC,,, | Performed by: ADVANCED PRACTICE MIDWIFE

## 2023-03-29 PROCEDURE — 76819 FETAL BIOPHYS PROFIL W/O NST: CPT | Mod: 26,S$PBB,, | Performed by: OBSTETRICS & GYNECOLOGY

## 2023-03-29 PROCEDURE — 99213 OFFICE O/P EST LOW 20 MIN: CPT | Mod: TH,S$PBB,, | Performed by: ADVANCED PRACTICE MIDWIFE

## 2023-03-29 PROCEDURE — 82570 ASSAY OF URINE CREATININE: CPT | Performed by: ADVANCED PRACTICE MIDWIFE

## 2023-03-29 PROCEDURE — 99212 OFFICE O/P EST SF 10 MIN: CPT | Mod: PBBFAC,TH,PN | Performed by: ADVANCED PRACTICE MIDWIFE

## 2023-03-29 RX ORDER — SODIUM CHLORIDE 9 MG/ML
INJECTION, SOLUTION INTRAVENOUS
Status: DISCONTINUED | OUTPATIENT
Start: 2023-03-29 | End: 2023-03-30

## 2023-03-29 RX ORDER — SIMETHICONE 80 MG
1 TABLET,CHEWABLE ORAL 4 TIMES DAILY PRN
Status: DISCONTINUED | OUTPATIENT
Start: 2023-03-29 | End: 2023-03-30

## 2023-03-29 RX ORDER — ACETAMINOPHEN 500 MG
500 TABLET ORAL EVERY 6 HOURS PRN
Status: DISCONTINUED | OUTPATIENT
Start: 2023-03-29 | End: 2023-03-29

## 2023-03-29 RX ORDER — MISOPROSTOL 100 MCG
50 TABLET ORAL EVERY 4 HOURS
Status: DISCONTINUED | OUTPATIENT
Start: 2023-03-29 | End: 2023-03-29

## 2023-03-29 RX ORDER — LIDOCAINE HYDROCHLORIDE 10 MG/ML
10 INJECTION, SOLUTION EPIDURAL; INFILTRATION; INTRACAUDAL; PERINEURAL ONCE AS NEEDED
Status: DISCONTINUED | OUTPATIENT
Start: 2023-03-29 | End: 2023-03-30

## 2023-03-29 RX ORDER — CALCIUM CARBONATE 200(500)MG
500 TABLET,CHEWABLE ORAL 3 TIMES DAILY PRN
Status: DISCONTINUED | OUTPATIENT
Start: 2023-03-29 | End: 2023-03-30

## 2023-03-29 RX ORDER — OXYTOCIN/RINGER'S LACTATE 30/500 ML
334 PLASTIC BAG, INJECTION (ML) INTRAVENOUS ONCE
Status: DISCONTINUED | OUTPATIENT
Start: 2023-03-29 | End: 2023-03-30

## 2023-03-29 RX ORDER — METHYLERGONOVINE MALEATE 0.2 MG/ML
200 INJECTION INTRAVENOUS
Status: DISCONTINUED | OUTPATIENT
Start: 2023-03-29 | End: 2023-03-30

## 2023-03-29 RX ORDER — OXYTOCIN/RINGER'S LACTATE 30/500 ML
41.7 PLASTIC BAG, INJECTION (ML) INTRAVENOUS CONTINUOUS
Status: ACTIVE | OUTPATIENT
Start: 2023-03-29 | End: 2023-03-29

## 2023-03-29 RX ORDER — TRANEXAMIC ACID 10 MG/ML
1000 INJECTION, SOLUTION INTRAVENOUS ONCE AS NEEDED
Status: DISCONTINUED | OUTPATIENT
Start: 2023-03-29 | End: 2023-03-30

## 2023-03-29 RX ORDER — OXYTOCIN/RINGER'S LACTATE 30/500 ML
334 PLASTIC BAG, INJECTION (ML) INTRAVENOUS ONCE AS NEEDED
Status: DISCONTINUED | OUTPATIENT
Start: 2023-03-29 | End: 2023-03-30

## 2023-03-29 RX ORDER — SODIUM CHLORIDE, SODIUM LACTATE, POTASSIUM CHLORIDE, CALCIUM CHLORIDE 600; 310; 30; 20 MG/100ML; MG/100ML; MG/100ML; MG/100ML
INJECTION, SOLUTION INTRAVENOUS CONTINUOUS
Status: DISCONTINUED | OUTPATIENT
Start: 2023-03-29 | End: 2023-03-30

## 2023-03-29 RX ORDER — MISOPROSTOL 200 UG/1
800 TABLET ORAL ONCE AS NEEDED
Status: DISCONTINUED | OUTPATIENT
Start: 2023-03-29 | End: 2023-03-30

## 2023-03-29 RX ORDER — OXYTOCIN/RINGER'S LACTATE 30/500 ML
0-30 PLASTIC BAG, INJECTION (ML) INTRAVENOUS CONTINUOUS
Status: DISCONTINUED | OUTPATIENT
Start: 2023-03-29 | End: 2023-03-29

## 2023-03-29 RX ORDER — TERBUTALINE SULFATE 1 MG/ML
0.25 INJECTION SUBCUTANEOUS
Status: DISCONTINUED | OUTPATIENT
Start: 2023-03-29 | End: 2023-03-30

## 2023-03-29 RX ORDER — PROCHLORPERAZINE EDISYLATE 5 MG/ML
5 INJECTION INTRAMUSCULAR; INTRAVENOUS EVERY 6 HOURS PRN
Status: DISCONTINUED | OUTPATIENT
Start: 2023-03-29 | End: 2023-03-30

## 2023-03-29 RX ORDER — CARBOPROST TROMETHAMINE 250 UG/ML
250 INJECTION, SOLUTION INTRAMUSCULAR
Status: DISCONTINUED | OUTPATIENT
Start: 2023-03-29 | End: 2023-03-30

## 2023-03-29 RX ORDER — OXYTOCIN 10 [USP'U]/ML
10 INJECTION, SOLUTION INTRAMUSCULAR; INTRAVENOUS ONCE AS NEEDED
Status: DISCONTINUED | OUTPATIENT
Start: 2023-03-29 | End: 2023-03-30

## 2023-03-29 RX ORDER — ONDANSETRON 8 MG/1
8 TABLET, ORALLY DISINTEGRATING ORAL EVERY 8 HOURS PRN
Status: DISCONTINUED | OUTPATIENT
Start: 2023-03-29 | End: 2023-03-30

## 2023-03-29 RX ORDER — MISOPROSTOL 200 UG/1
600 TABLET ORAL
Status: DISCONTINUED | OUTPATIENT
Start: 2023-03-29 | End: 2023-03-30

## 2023-03-29 RX ORDER — OXYTOCIN/RINGER'S LACTATE 30/500 ML
95 PLASTIC BAG, INJECTION (ML) INTRAVENOUS ONCE AS NEEDED
Status: DISCONTINUED | OUTPATIENT
Start: 2023-03-29 | End: 2023-03-30

## 2023-03-29 RX ORDER — OXYTOCIN/RINGER'S LACTATE 30/500 ML
0-30 PLASTIC BAG, INJECTION (ML) INTRAVENOUS CONTINUOUS
Status: DISCONTINUED | OUTPATIENT
Start: 2023-03-29 | End: 2023-03-30

## 2023-03-29 RX ORDER — BUTALBITAL, ACETAMINOPHEN AND CAFFEINE 50; 325; 40 MG/1; MG/1; MG/1
1 TABLET ORAL EVERY 4 HOURS PRN
Status: DISCONTINUED | OUTPATIENT
Start: 2023-03-29 | End: 2023-03-30

## 2023-03-29 RX ADMIN — Medication 2 MILLI-UNITS/MIN: at 03:03

## 2023-03-29 RX ADMIN — BUTALBITAL, ACETAMINOPHEN, AND CAFFEINE 1 TABLET: 325; 50; 40 TABLET ORAL at 11:03

## 2023-03-29 RX ADMIN — SODIUM CHLORIDE, POTASSIUM CHLORIDE, SODIUM LACTATE AND CALCIUM CHLORIDE: 600; 310; 30; 20 INJECTION, SOLUTION INTRAVENOUS at 02:03

## 2023-03-29 NOTE — SUBJECTIVE & OBJECTIVE
Obstetric HPI:  Patient reports Intensity: mild contractions, active fetal movement, No vaginal bleeding , No loss of fluid     This pregnancy has been complicated by:   Obesity  AMA  Grand multiparity     OB History    Para Term  AB Living   9 4 4 0 4 4   SAB IAB Ectopic Multiple Live Births   4 0 0 0 4      # Outcome Date GA Lbr Drake/2nd Weight Sex Delivery Anes PTL Lv   9 Current            8 SAB 05/10/20           7 Term 18 39w6d / 00:05 3.47 kg (7 lb 10.4 oz) F Vag-Spont EPI N NOAM      Name: LEEANN ROSS      Apgar1: 9  Apgar5: 9   6 Term 02/10/15 40w0d  3.912 kg (8 lb 10 oz) F Vag-Spont EPI N NOAM   5 Term 03 40w0d 01:00 3.969 kg (8 lb 12 oz) F Vag-Spont None N NOAM   4 Term 99 40w0d 12:00 4.252 kg (9 lb 6 oz) M Vag-Spont None N NOAM   3 SAB      SAB      2 SAB            1 SAB              Past Medical History:   Diagnosis Date    Abnormal Pap smear 2005    colposcopy    Abnormal Pap smear of cervix         Asthma affecting pregnancy, antepartum 3/12/2018    Miscarriage      Past Surgical History:   Procedure Laterality Date    Right Knee         PTA Medications   Medication Sig    albuterol (PROVENTIL/VENTOLIN HFA) 90 mcg/actuation inhaler Inhale 2 puffs into the lungs every 6 (six) hours as needed.    SYMBICORT 80-4.5 mcg/actuation HFAA Inhale 1 puff into the lungs once daily.       Review of patient's allergies indicates:  No Known Allergies     Family History    None       Tobacco Use    Smoking status: Never     Passive exposure: Never    Smokeless tobacco: Never   Substance and Sexual Activity    Alcohol use: No    Drug use: No    Sexual activity: Yes     Partners: Male     Birth control/protection: None     Review of Systems   Gastrointestinal:  Positive for abdominal pain (mild ctx).   Genitourinary:  Negative for vaginal bleeding and vaginal discharge.   Neurological:  Positive for headaches.   All other systems reviewed and are negative.   Objective:      Vital Signs (Most Recent):  Pulse: 67 (03/29/23 1150)  Resp: 18 (03/29/23 1009)  BP: 121/77 (03/29/23 1150) Vital Signs (24h Range):  Pulse:  [67-84] 67  Resp:  [18] 18  BP: (120-140)/(74-84) 121/77        There is no height or weight on file to calculate BMI.    FHT: Cat 1 (reassuring)  TOCO:  Irregular     Physical Exam:   Constitutional: She is oriented to person, place, and time. She appears well-developed and well-nourished.    HENT:   Head: Normocephalic and atraumatic.    Eyes: Conjunctivae and EOM are normal.     Cardiovascular:  Normal rate.      Exam reveals edema.        Pulmonary/Chest: Effort normal. No respiratory distress.        Abdominal: Soft. She exhibits no distension. There is no abdominal tenderness.     Genitourinary:    Vagina and uterus normal.             Musculoskeletal: Normal range of motion and moves all extremeties.       Neurological: She is alert and oriented to person, place, and time.    Skin: Skin is warm and dry.    Psychiatric: She has a normal mood and affect. Her behavior is normal. Judgment and thought content normal.     Cervix:  Dilation:  1 cm per Jan's exam in clinic today     Significant Labs:  Lab Results   Component Value Date    GROUPTRH A POS 08/26/2022    HEPBSAG Negative 08/26/2022    STREPBCULT No Group B Streptococcus isolated 03/08/2023       I have personallly reviewed all pertinent lab results from the last 24 hours.

## 2023-03-29 NOTE — PROGRESS NOTES
38 y.o. female  at 39w0d   Reports + FM, denies VB, LOF or regular CTX  Doing well without concerns     Blood pressure 128/84 with 2+ edema, 1+ protein and 8 lb weight gain since last visit.  Complaining of frontal headache since last Friday, no other symptoms.  Reflexes 2+.    Taking blood pressure at home states blood pressures arranging from 126-146/84-96.    Last weeks PIH labs were improved with PCR 0.08  Findings were discussed thoroughly and it was determined we would sent to Labor and delivery for PIH workup and evaluation-report given   Ultrasound-vertex, MVP 7.9 cm, ENE 12.6 cm, EFW 7 lb 14 oz 66 percentile with AC at 61 percentile.  BPP is 8 8-reassuring    AMA, was considering induction next week,    TW lbs     Cervix is posterior with vertex high, uncomfortable exam, appears to be 1 cm  Reviewed warning signs, normal FKCs, labor precautions and how/when to call.  RTC x 1 wks with ultrasound if not delivered, call or present sooner prn.     I spent a total of 20 minutes on the day of the visit.This includes face to face time and non-face to face time preparing to see the patient (eg, review of tests), Obtaining and/or reviewing separately obtained history, Documenting clinical information in the electronic or other health record, Independently interpreting resultsand communicating results to the patient/family/caregiver, or Care coordination.

## 2023-03-29 NOTE — PATIENT INSTRUCTIONS
Patient Education       Pregnancy - The Ninth Month   About this topic   It is important for you to learn how to take care of yourself to help you have a healthy baby and safe delivery. It is good to have health care throughout your pregnancy.  The ninth month of your pregnancy starts around week 37 and lasts through delivery. By knowing how far along you are, you can learn what is normal for this stage of your pregnancy and plan for what is next.  General   Your body   During the ninth month of your pregnancy, here are some things you can expect.  You may:  Lose your mucous plug as your cervix starts to get thinner and dilate.  Notice a small amount of streaky red or pink spotting.  Your doctor may discuss stripping your membranes to help the labor progress.  Go into labor any time. Most women deliver their baby between 38 and 42 weeks.  Notice your belly button sticks out. It should go back to normal after you give birth.  Have a bit of extra energy as you get ready for your baby  Notice your breasts are leaking more. This is normal as your body is making the first milk you can feed your baby.  Have tests to check on how your baby is doing. Your doctor will most likely not let you be pregnant for more than 42 weeks.  Not gain any weight this month. Some mothers even lose 1 to 2 pounds (.45 to .9 kg).  Your baby's growth and development:  Your baby has been busy swallowing fluid and building up waste products for their first bowel movement.  Your baby may start sucking their thumb.  They may come out with dry skin, bruises, or a misshapen head. Living in a watery fluid and going through labor is tough on your baby too. These are all normal and will change in the first weeks of life.  Your baby may have lots of hair on their head or not very much at all. Long fingernails are normal.  Your baby is about 20 inches (51 cm) long and weighs about 7 1/2 pounds (3,400 gm). Your baby is about the size of a  watermelon.  Things to Think About   Avoid alcohol, drugs, tobacco products, and second hand smoke.  Talk to your doctor if you plan to travel or get on a plane.  Have your bag packed so you are ready for delivery.  Are you planning a natural childbirth or thinking about an epidural? Know things you can do to help cope with labor pain.  If you are having a boy, decide if you want to have him circumcised.  Be sure the car seat is installed the right way so you are ready to bring your baby home.  When do I need to call the doctor?   Contractions every 5 minutes or more often that do not go away with rest, drinking water, or position changes  Headache that does not go away; blurry vision; seeing spots or halos; increase in swelling in your hands, feet, or face; and pain under your ribs on the right side  Low, dull back pain that does not go away  Pressure in your pelvis that feels like your baby is pushing down  A gush or constant trickle of watery or bloody fluid leaking from your vagina  Little to no movement felt by baby in 2 hours. Your baby should be moving at least 10 times every 2 hours.  Vaginal bleeding with or without pain  Fever of 100.4°F (38°C) or higher  After a car accident, fall, or any trauma to your belly  Having thoughts of harming yourself or others, or do not feel safe at home  Where can I learn more?   American Academy of [employer]icians  https://familydoctor.org/changes-in-your-body-during-pregnancy-third-trimester/   Kids Health  http://kidshealth.org/en/parents/pregnancy-calendar-intro.html?WT.ac=p-barbie   Office on Womens Health  https://www.womenshealth.gov/pregnancy/youre-pregnant-now-what/stages-pregnancy   Last Reviewed Date   2020-05-06  Consumer Information Use and Disclaimer   This information is not specific medical advice and does not replace information you receive from your health care provider. This is only a brief summary of general information. It does NOT include all  information about conditions, illnesses, injuries, tests, procedures, treatments, therapies, discharge instructions or life-style choices that may apply to you. You must talk with your health care provider for complete information about your health and treatment options. This information should not be used to decide whether or not to accept your health care providers advice, instructions or recommendations. Only your health care provider has the knowledge and training to provide advice that is right for you.  Copyright   Copyright © 2021 [employer] and its affiliates and/or licensors. All rights reserved.  Patient Education       Fetal Movement   About this topic   Feeling your baby move for the first time is a good sign that your baby is doing well. You may begin to feel these movements between the 18th and 25th weeks of your pregnancy. If this is your first time being pregnant, it may be closer to 25 weeks. Your baby has been moving around before this, but the kicks have not been strong enough for you to feel. During the first weeks of feeling movement, you may start to see a pattern during the day when your baby is most active. You can track your baby's kicks each day at home. This is also known as kick counting. It is a good way to check on your baby's movements and well being.  Most often, fetal kick counting is used in high-risk pregnancies. It may be useful for all pregnancies. Counting and writing down your baby's kicks, jabs, twists, flutters, rolls, turns, flips, and swishes may help find a problem that needs more evaluation. The American College of Obstetricians and Gynecologists, or ACOG, suggests that you record how much time it takes you to feel 10 of these movements. Ideally, you should be able to feel 10 movements within 2 hours. Many people will track these movements in much less time.  General   How to Track Your Baby's Kick Counts   Most often your doctor will want you to wait until the 28th  "to 30th weeks of your pregnancy to start kick counting. Here are some tips to help you get started.  Find the time of day when your baby is most active. For some people, this is right after eating. Others find their baby moving a lot after they have been exercising or more active. Some babies are more active in the evenings when your blood sugar starts to lower.  Try to count kicks at about the same time each day.  Before you start counting, have something to eat or drink. Also take a short walk or do some light activity.  Choose a quiet place where you can focus on your baby's movements. Also get in a comfortable position. Try and lie on one side or the other. You may need to change positions until you find one that works best for you and your baby.  Keep a notebook to track your baby's kicks. Your doctor may give you a chart to use or you can make your own. Write down the date, time you started counting, and the time of each "kick" during a 2-hour period until you have felt 10 kicks.  Once you have recorded 10 kicks within 2 hours you can stop counting.  If you are not able to record 10 movements over 2 hours you should get up and move around or eat something and try again.  If you are not able to record 10 movements over 2 hours the second time, call your doctor. They may want you to go to the hospital to get your baby checked.  When do I need to call the doctor?   You have felt less than 10 movements over a period of 2 hours.  It takes longer each day to record 10 movements.  There is a big change in the pattern of movements you are writing down.  You feel no movement for 2 hours even after eating a snack, light activity, and position changes.  Teach Back: Helping You Understand   The Teach Back Method helps you understand the information we are giving you. After you talk with the staff, tell them in your own words what you learned. This helps to make sure the staff has described each thing clearly. It also " helps to explain things that may have been confusing. Before going home, make sure you can do these:  I can tell you about feeling my baby move.  I can tell you how I will track my babys kicks.  I can tell you what I will do if I feel less than 10 movements in 2 hours, it takes longer to feel my baby move 10 times, or there is a big change in how my baby is moving.  Last Reviewed Date   2021-10-08  Consumer Information Use and Disclaimer   This information is not specific medical advice and does not replace information you receive from your health care provider. This is only a brief summary of general information. It does NOT include all information about conditions, illnesses, injuries, tests, procedures, treatments, therapies, discharge instructions or life-style choices that may apply to you. You must talk with your health care provider for complete information about your health and treatment options. This information should not be used to decide whether or not to accept your health care providers advice, instructions or recommendations. Only your health care provider has the knowledge and training to provide advice that is right for you.  Copyright   Copyright © 2021 [employer] and its affiliates and/or licensors. All rights reserved.

## 2023-03-29 NOTE — ASSESSMENT & PLAN NOTE
OB triage  Serial BP's mostly normal with a few mildly elevated pressures. PIH work-up normal.  Reviewed r/b of proceeding with IOL due to AMA and labile BP's. Pt agreeable to proceeding with induction. PO Cytotec.   Fioricet PRN  Desires NCB  Continue to monitor maternal/fetal status closely  Anticipate progress and vaginal birth

## 2023-03-29 NOTE — HOSPITAL COURSE
OB triage  Serial BP's mostly normal with a few mildly elevated pressures. PIH work-up normal.  Reviewed r/b of proceeding with IOL due to AMA and labile BP's. Pt agreeable to proceeding with induction. PO Cytotec.     3/29/23--Pitocin infusing, desires NCB, anticipate progress and vaginal delivery   3/30/23 0745 epidural in place, pitocin at 36mu/min, 4/80/-2, AROM large amount of clear fluid, will do pit break 30 mins and restart at half. Fioricet for headache, BP WNL.     3/31/23 PPD1: routine PP orders. BP stable 120/80s with some 140/80s. Will continue to monitor     4/1/23 PPD2: routine PP discharge orders given including Please notify if experiencing increased vaginal bleeding, dizziness, headache, blurred vision, and/or signs of postpartum depression.  BP stable. Will schedule 1 week BP check. patient verbalized understanding

## 2023-03-29 NOTE — PROGRESS NOTES
Periods of minimal variability. Strip has been otherwise reassuring with no decelerations. Will proceed with Pitocin IOL.

## 2023-03-29 NOTE — H&P
[facility] - Labor & Delivery  Obstetrics  History & Physical    Patient Name: Mitzy Malik  MRN: [id number]  Admission Date: 3/29/2023  Primary Care Provider: Primary Doctor No    Subjective:     Principal Problem:Labile blood pressure    History of Present Illness:   39w0d sent from office for pre-e work-up      Obstetric HPI:  Patient reports Intensity: mild contractions, active fetal movement, No vaginal bleeding , No loss of fluid     This pregnancy has been complicated by:   Obesity  AMA  Grand multiparity     OB History    Para Term  AB Living   9 4 4 0 4 4   SAB IAB Ectopic Multiple Live Births   4 0 0 0 4      # Outcome Date GA Lbr Drake/2nd Weight Sex Delivery Anes PTL Lv   9 Current            8 SAB 05/10/20           7 Term 18 39w6d / 00:05 3.47 kg (7 lb 10.4 oz) F Vag-Spont EPI N NOAM      Name: VANDANA, GIRL MITZY      Apgar1: 9  Apgar5: 9   6 Term 02/10/15 40w0d  3.912 kg (8 lb 10 oz) F Vag-Spont EPI N NOAM   5 Term 03 40w0d 01:00 3.969 kg (8 lb 12 oz) F Vag-Spont None N NOAM   4 Term 99 40w0d 12:00 4.252 kg (9 lb 6 oz) M Vag-Spont None N NOAM   3 SAB      SAB      2 SAB            1 SAB              Past Medical History:   Diagnosis Date    Abnormal Pap smear 2005    colposcopy    Abnormal Pap smear of cervix         Asthma affecting pregnancy, antepartum 3/12/2018    Miscarriage      Past Surgical History:   Procedure Laterality Date    Right Knee         PTA Medications   Medication Sig    albuterol (PROVENTIL/VENTOLIN HFA) 90 mcg/actuation inhaler Inhale 2 puffs into the lungs every 6 (six) hours as needed.    SYMBICORT 80-4.5 mcg/actuation HFAA Inhale 1 puff into the lungs once daily.       Review of patient's allergies indicates:  No Known Allergies     Family History    None       Tobacco Use    Smoking status: Never     Passive exposure: Never    Smokeless tobacco: Never   Substance and Sexual Activity    Alcohol use: No    Drug use: No     Sexual activity: Yes     Partners: Male     Birth control/protection: None     Review of Systems   Gastrointestinal:  Positive for abdominal pain (mild ctx).   Genitourinary:  Negative for vaginal bleeding and vaginal discharge.   Neurological:  Positive for headaches.   All other systems reviewed and are negative.   Objective:     Vital Signs (Most Recent):  Pulse: 67 (23 1150)  Resp: 18 (23 1009)  BP: 121/77 (23 1150) Vital Signs (24h Range):  Pulse:  [67-84] 67  Resp:  [18] 18  BP: (120-140)/(74-84) 121/77        There is no height or weight on file to calculate BMI.    FHT: Cat 1 (reassuring)  TOCO:  Irregular     Physical Exam:   Constitutional: She is oriented to person, place, and time. She appears well-developed and well-nourished.    HENT:   Head: Normocephalic and atraumatic.    Eyes: Conjunctivae and EOM are normal.     Cardiovascular:  Normal rate.      Exam reveals edema.        Pulmonary/Chest: Effort normal. No respiratory distress.        Abdominal: Soft. She exhibits no distension. There is no abdominal tenderness.     Genitourinary:    Vagina and uterus normal.             Musculoskeletal: Normal range of motion and moves all extremeties.       Neurological: She is alert and oriented to person, place, and time.    Skin: Skin is warm and dry.    Psychiatric: She has a normal mood and affect. Her behavior is normal. Judgment and thought content normal.     Cervix:  Dilation:  1 cm per Jethro's exam in clinic today     Significant Labs:  Lab Results   Component Value Date    GROUPTRH A POS 2022    HEPBSAG Negative 2022    STREPBCULT No Group B Streptococcus isolated 2023       I have personallly reviewed all pertinent lab results from the last 24 hours.    Assessment/Plan:     38 y.o. female  at 39w0d for:    * Labile blood pressure  Proceed with IOL    Pregnancy headache in third trimester  OB triage  Serial BP's mostly normal with a few mildly elevated  pressures. PIH work-up normal.  Reviewed r/b of proceeding with IOL due to AMA and labile BP's. Pt agreeable to proceeding with induction. PO Cytotec.   Fioricet PRN  Desires NCB  Continue to monitor maternal/fetal status closely  Anticipate progress and vaginal birth    Antepartum multigravida of advanced maternal age  Delivery 39-39.6 weeks per [facility] guidelines     Obesity affecting pregnancy, antepartum  Lovenox PP      Eugenia Escobar, WILLIEM  Obstetrics  [facility] - Labor & Delivery

## 2023-03-30 ENCOUNTER — ANESTHESIA (OUTPATIENT)
Dept: OBSTETRICS AND GYNECOLOGY | Facility: HOSPITAL | Age: 38
End: 2023-03-30
Payer: MEDICAID

## 2023-03-30 ENCOUNTER — ANESTHESIA EVENT (OUTPATIENT)
Dept: OBSTETRICS AND GYNECOLOGY | Facility: HOSPITAL | Age: 38
End: 2023-03-30
Payer: MEDICAID

## 2023-03-30 PROCEDURE — 63600175 PHARM REV CODE 636 W HCPCS: Performed by: MIDWIFE

## 2023-03-30 PROCEDURE — 72200005 HC VAGINAL DELIVERY LEVEL II

## 2023-03-30 PROCEDURE — 27200710 HC EPIDURAL INFUSION PUMP SET: Performed by: STUDENT IN AN ORGANIZED HEALTH CARE EDUCATION/TRAINING PROGRAM

## 2023-03-30 PROCEDURE — 72100003 HC LABOR CARE, EA. ADDL. 8 HRS

## 2023-03-30 PROCEDURE — 11000001 HC ACUTE MED/SURG PRIVATE ROOM

## 2023-03-30 PROCEDURE — 51702 INSERT TEMP BLADDER CATH: CPT

## 2023-03-30 PROCEDURE — 25000003 PHARM REV CODE 250: Performed by: MIDWIFE

## 2023-03-30 PROCEDURE — 25000003 PHARM REV CODE 250: Performed by: ADVANCED PRACTICE MIDWIFE

## 2023-03-30 PROCEDURE — 62326 NJX INTERLAMINAR LMBR/SAC: CPT | Performed by: NURSE ANESTHETIST, CERTIFIED REGISTERED

## 2023-03-30 PROCEDURE — C1751 CATH, INF, PER/CENT/MIDLINE: HCPCS | Performed by: STUDENT IN AN ORGANIZED HEALTH CARE EDUCATION/TRAINING PROGRAM

## 2023-03-30 PROCEDURE — 59409 PR OBSTETRICAL CARE,VAG DELIV ONLY: ICD-10-PCS | Mod: GB,,, | Performed by: ADVANCED PRACTICE MIDWIFE

## 2023-03-30 PROCEDURE — 63600175 PHARM REV CODE 636 W HCPCS: Performed by: ADVANCED PRACTICE MIDWIFE

## 2023-03-30 PROCEDURE — 59409 OBSTETRICAL CARE: CPT | Mod: GB,,, | Performed by: ADVANCED PRACTICE MIDWIFE

## 2023-03-30 PROCEDURE — 63600175 PHARM REV CODE 636 W HCPCS: Performed by: STUDENT IN AN ORGANIZED HEALTH CARE EDUCATION/TRAINING PROGRAM

## 2023-03-30 PROCEDURE — 25000003 PHARM REV CODE 250: Performed by: NURSE ANESTHETIST, CERTIFIED REGISTERED

## 2023-03-30 RX ORDER — MISOPROSTOL 200 UG/1
800 TABLET ORAL ONCE AS NEEDED
Status: DISCONTINUED | OUTPATIENT
Start: 2023-03-30 | End: 2023-04-01 | Stop reason: HOSPADM

## 2023-03-30 RX ORDER — IBUPROFEN 600 MG/1
600 TABLET ORAL EVERY 6 HOURS
Status: DISCONTINUED | OUTPATIENT
Start: 2023-03-30 | End: 2023-04-01 | Stop reason: HOSPADM

## 2023-03-30 RX ORDER — SODIUM CHLORIDE 0.9 % (FLUSH) 0.9 %
10 SYRINGE (ML) INJECTION
Status: DISCONTINUED | OUTPATIENT
Start: 2023-03-30 | End: 2023-04-01 | Stop reason: HOSPADM

## 2023-03-30 RX ORDER — DIPHENHYDRAMINE HCL 25 MG
25 CAPSULE ORAL EVERY 4 HOURS PRN
Status: DISCONTINUED | OUTPATIENT
Start: 2023-03-30 | End: 2023-04-01 | Stop reason: HOSPADM

## 2023-03-30 RX ORDER — ACETAMINOPHEN 325 MG/1
650 TABLET ORAL EVERY 6 HOURS PRN
Status: DISCONTINUED | OUTPATIENT
Start: 2023-03-30 | End: 2023-04-01 | Stop reason: HOSPADM

## 2023-03-30 RX ORDER — HYDROCORTISONE 25 MG/G
CREAM TOPICAL 3 TIMES DAILY PRN
Status: DISCONTINUED | OUTPATIENT
Start: 2023-03-30 | End: 2023-04-01 | Stop reason: HOSPADM

## 2023-03-30 RX ORDER — DIPHENHYDRAMINE HYDROCHLORIDE 50 MG/ML
25 INJECTION INTRAMUSCULAR; INTRAVENOUS EVERY 4 HOURS PRN
Status: DISCONTINUED | OUTPATIENT
Start: 2023-03-30 | End: 2023-04-01 | Stop reason: HOSPADM

## 2023-03-30 RX ORDER — OXYTOCIN/RINGER'S LACTATE 30/500 ML
95 PLASTIC BAG, INJECTION (ML) INTRAVENOUS ONCE
Status: DISCONTINUED | OUTPATIENT
Start: 2023-03-30 | End: 2023-04-01 | Stop reason: HOSPADM

## 2023-03-30 RX ORDER — OXYTOCIN/RINGER'S LACTATE 30/500 ML
334 PLASTIC BAG, INJECTION (ML) INTRAVENOUS ONCE AS NEEDED
Status: DISCONTINUED | OUTPATIENT
Start: 2023-03-30 | End: 2023-04-01 | Stop reason: HOSPADM

## 2023-03-30 RX ORDER — OXYTOCIN 10 [USP'U]/ML
10 INJECTION, SOLUTION INTRAMUSCULAR; INTRAVENOUS ONCE AS NEEDED
Status: DISCONTINUED | OUTPATIENT
Start: 2023-03-30 | End: 2023-04-01 | Stop reason: HOSPADM

## 2023-03-30 RX ORDER — ONDANSETRON 8 MG/1
8 TABLET, ORALLY DISINTEGRATING ORAL EVERY 8 HOURS PRN
Status: DISCONTINUED | OUTPATIENT
Start: 2023-03-30 | End: 2023-04-01 | Stop reason: HOSPADM

## 2023-03-30 RX ORDER — LIDOCAINE HYDROCHLORIDE AND EPINEPHRINE 15; 5 MG/ML; UG/ML
INJECTION, SOLUTION EPIDURAL
Status: DISCONTINUED | OUTPATIENT
Start: 2023-03-30 | End: 2023-03-30

## 2023-03-30 RX ORDER — DIPHENHYDRAMINE HYDROCHLORIDE 50 MG/ML
12.5 INJECTION INTRAMUSCULAR; INTRAVENOUS EVERY 4 HOURS PRN
Status: DISCONTINUED | OUTPATIENT
Start: 2023-03-30 | End: 2023-03-30

## 2023-03-30 RX ORDER — HYDROCODONE BITARTRATE AND ACETAMINOPHEN 5; 325 MG/1; MG/1
1 TABLET ORAL EVERY 4 HOURS PRN
Status: DISCONTINUED | OUTPATIENT
Start: 2023-03-30 | End: 2023-04-01 | Stop reason: HOSPADM

## 2023-03-30 RX ORDER — HYDROCODONE BITARTRATE AND ACETAMINOPHEN 7.5; 325 MG/1; MG/1
1 TABLET ORAL EVERY 4 HOURS PRN
Status: DISCONTINUED | OUTPATIENT
Start: 2023-03-30 | End: 2023-04-01 | Stop reason: HOSPADM

## 2023-03-30 RX ORDER — TRANEXAMIC ACID 10 MG/ML
1000 INJECTION, SOLUTION INTRAVENOUS ONCE AS NEEDED
Status: DISCONTINUED | OUTPATIENT
Start: 2023-03-30 | End: 2023-04-01 | Stop reason: HOSPADM

## 2023-03-30 RX ORDER — ROPIVACAINE HYDROCHLORIDE 2 MG/ML
12 INJECTION, SOLUTION EPIDURAL; INFILTRATION CONTINUOUS
Status: DISCONTINUED | OUTPATIENT
Start: 2023-03-30 | End: 2023-03-30

## 2023-03-30 RX ORDER — PROCHLORPERAZINE EDISYLATE 5 MG/ML
5 INJECTION INTRAMUSCULAR; INTRAVENOUS EVERY 6 HOURS PRN
Status: DISCONTINUED | OUTPATIENT
Start: 2023-03-30 | End: 2023-04-01 | Stop reason: HOSPADM

## 2023-03-30 RX ORDER — DOCUSATE SODIUM 100 MG/1
200 CAPSULE, LIQUID FILLED ORAL 2 TIMES DAILY PRN
Status: DISCONTINUED | OUTPATIENT
Start: 2023-03-30 | End: 2023-04-01 | Stop reason: HOSPADM

## 2023-03-30 RX ORDER — OXYTOCIN/RINGER'S LACTATE 30/500 ML
95 PLASTIC BAG, INJECTION (ML) INTRAVENOUS ONCE AS NEEDED
Status: DISCONTINUED | OUTPATIENT
Start: 2023-03-30 | End: 2023-04-01 | Stop reason: HOSPADM

## 2023-03-30 RX ORDER — CARBOPROST TROMETHAMINE 250 UG/ML
250 INJECTION, SOLUTION INTRAMUSCULAR
Status: DISCONTINUED | OUTPATIENT
Start: 2023-03-30 | End: 2023-04-01 | Stop reason: HOSPADM

## 2023-03-30 RX ORDER — PRENATAL WITH FERROUS FUM AND FOLIC ACID 3080; 920; 120; 400; 22; 1.84; 3; 20; 10; 1; 12; 200; 27; 25; 2 [IU]/1; [IU]/1; MG/1; [IU]/1; MG/1; MG/1; MG/1; MG/1; MG/1; MG/1; UG/1; MG/1; MG/1; MG/1; MG/1
1 TABLET ORAL DAILY
Status: DISCONTINUED | OUTPATIENT
Start: 2023-03-30 | End: 2023-04-01 | Stop reason: HOSPADM

## 2023-03-30 RX ORDER — METHYLERGONOVINE MALEATE 0.2 MG/ML
200 INJECTION INTRAVENOUS
Status: DISCONTINUED | OUTPATIENT
Start: 2023-03-30 | End: 2023-04-01 | Stop reason: HOSPADM

## 2023-03-30 RX ORDER — SIMETHICONE 80 MG
1 TABLET,CHEWABLE ORAL EVERY 6 HOURS PRN
Status: DISCONTINUED | OUTPATIENT
Start: 2023-03-30 | End: 2023-04-01 | Stop reason: HOSPADM

## 2023-03-30 RX ADMIN — ROPIVACAINE HYDROCHLORIDE 12 ML/HR: 2 INJECTION, SOLUTION EPIDURAL; INFILTRATION at 03:03

## 2023-03-30 RX ADMIN — LIDOCAINE HYDROCHLORIDE,EPINEPHRINE BITARTRATE 3 ML: 15; .005 INJECTION, SOLUTION EPIDURAL; INFILTRATION; INTRACAUDAL; PERINEURAL at 03:03

## 2023-03-30 RX ADMIN — Medication 4 MILLI-UNITS/MIN: at 08:03

## 2023-03-30 RX ADMIN — IBUPROFEN 600 MG: 600 TABLET ORAL at 05:03

## 2023-03-30 RX ADMIN — SODIUM CHLORIDE, POTASSIUM CHLORIDE, SODIUM LACTATE AND CALCIUM CHLORIDE: 600; 310; 30; 20 INJECTION, SOLUTION INTRAVENOUS at 01:03

## 2023-03-30 RX ADMIN — PRENATAL VITAMINS-IRON FUMARATE 27 MG IRON-FOLIC ACID 0.8 MG TABLET 1 TABLET: at 02:03

## 2023-03-30 RX ADMIN — SODIUM CHLORIDE, POTASSIUM CHLORIDE, SODIUM LACTATE AND CALCIUM CHLORIDE: 600; 310; 30; 20 INJECTION, SOLUTION INTRAVENOUS at 03:03

## 2023-03-30 RX ADMIN — IBUPROFEN 600 MG: 600 TABLET ORAL at 11:03

## 2023-03-30 RX ADMIN — SODIUM CHLORIDE, POTASSIUM CHLORIDE, SODIUM LACTATE AND CALCIUM CHLORIDE: 600; 310; 30; 20 INJECTION, SOLUTION INTRAVENOUS at 08:03

## 2023-03-30 RX ADMIN — ROPIVACAINE HYDROCHLORIDE 8 ML: 2 INJECTION, SOLUTION EPIDURAL; INFILTRATION at 03:03

## 2023-03-30 RX ADMIN — BUTALBITAL, ACETAMINOPHEN, AND CAFFEINE 1 TABLET: 325; 50; 40 TABLET ORAL at 07:03

## 2023-03-30 RX ADMIN — IBUPROFEN 600 MG: 600 TABLET ORAL at 01:03

## 2023-03-30 NOTE — SUBJECTIVE & OBJECTIVE
Interval History:  Pranay is a 38 y.o.  at 39w0d. She is doing well. Feeling the contractions.     Objective:     Vital Signs (Most Recent):  Temp: 98.4 °F (36.9 °C) (23 1603)  Pulse: (!) 59 (23 1803)  Resp: 18 (23 1603)  BP: 125/84 (23 1803)  SpO2: 98 % (23 1435)   Vital Signs (24h Range):  Temp:  [98 °F (36.7 °C)-98.4 °F (36.9 °C)] 98.4 °F (36.9 °C)  Pulse:  [54-84] 59  Resp:  [18] 18  SpO2:  [98 %-100 %] 98 %  BP: (120-143)/() 125/84        There is no height or weight on file to calculate BMI.    FHT: 125bpm with moderate variability and accelerations, no decelerations, Cat 1 (reassuring)  Has periods of cat 2 with minimal variability, no decelerations, resolves after 30min  TOCO:  Irregular    Cervical Exam:  Deferred at this time     Significant Labs:  Lab Results   Component Value Date    GROUPTRH A POS 2023    HEPBSAG Negative 2022    STREPBCULT No Group B Streptococcus isolated 2023       I have personallly reviewed all pertinent lab results from the last 24 hours.    Physical Exam:   Constitutional: She is oriented to person, place, and time. She appears well-developed and well-nourished.    HENT:   Head: Normocephalic.      Cardiovascular:  Normal rate.             Pulmonary/Chest: Effort normal.        Abdominal: Soft. There is no abdominal tenderness.   Gravid             Musculoskeletal: Normal range of motion and moves all extremeties.       Neurological: She is alert and oriented to person, place, and time.    Skin: Skin is warm and dry.    Psychiatric: She has a normal mood and affect. Her behavior is normal. Judgment and thought content normal.     Review of Systems

## 2023-03-30 NOTE — LACTATION NOTE
Lactation Rounds:    Rounds made to see if nipple shells are working for mother. Mother has not worn nipple shells yet but says she will.     Discussed options of pumping/hand expressing if direct breastfeeding is unsuccessful after use of nipple shells. Encouraged hand expression after breastfeeding as needed.     Mother verbalizes understanding of all education and counseling. Mother denies any further lactation needs or concerns at this time. Discussed lactation availability. Encouraged mother to call for assistance when needs arise.

## 2023-03-30 NOTE — ASSESSMENT & PLAN NOTE
epidural in place, pitocin at 36mu/min, 4/80/-2, AROM large amount of clear fluid, will do pit break 30 mins and restart at half. Fioricet for headache, BP WNL.

## 2023-03-30 NOTE — L&D DELIVERY NOTE
[facility] - Labor & Delivery  Vaginal Delivery   Operative Note    SUMMARY     Normal spontaneous vaginal delivery of live infant, was placed on mothers abdomen for skin to skin and bulb suctioning performed.  Infant delivered position OA over intact perineum.  Nuchal cord: No.    Spontaneous delivery of placenta and IV pitocin given noting good uterine tone.  No lacerations noted.  Patient tolerated delivery well. Sponge needle and lap counted correctly x2. .    Indications: Labile blood pressure  Pregnancy complicated by:   Patient Active Problem List   Diagnosis    Grand multiparity, with current pregnancy, antepartum    Obesity affecting pregnancy, antepartum    Positive pregnancy test    Antepartum multigravida of advanced maternal age    History of miscarriage, currently pregnant    Fall    Labile blood pressure    Pregnancy headache in third trimester    Mild intermittent asthma without complication     Admitting GA: 39w1d    Delivery Information for Inocente Malik    Birth information:  YOB: 2025   Time of birth: 11:10 AM   Sex: female   Head Delivery Date/Time:     Delivery type:    Gestational Age: 39w1d  Unknown    Delivery Providers    Delivering clinician:            Measurements    Weight:   Length:          Apgars    Living status:   Apgars:  1 min.:  5 min.:  10 min.:  15 min.:  20 min.:    Skin color:         Heart rate:         Reflex irritability:         Muscle tone:         Respiratory effort:         Total:                                  Interventions/Resuscitation    Method: Bulb Suctioning, Tactile Stimulation       Cord    Vessels: 3 vessels  Complications: None  Delayed Cord Clamping?: Yes  Cord Clamped Date/Time: 3/30/2023 11:13 AM  Cord Blood Disposition: Discarded  Gases Sent?: No  Stem Cell Collection (by MD): No       Placenta    Placenta delivery date/time:   Placenta removal:            Labor Events:       labor: No     Labor Onset Date/Time:          Dilation Complete Date/Time: 2023 11:05     Start Pushing Date/Time: 2023 11:06       Start Pushing Date/Time: 2023 11:06     Rupture Date/Time: 23  0745         Rupture type: ARM (Artificial Rupture)           Fluid Amount:         Fluid Color: Clear                 steroids: None     Antibiotics given for GBS: No     Induction: oxytocin     Indications for induction:  Fetal Heart Rate or Rhythm Abnormality     Augmentation:       Indications for augmentation:       Labor complications: None     Additional complications:          Cervical ripening:                     Delivery:      Episiotomy:       Indication for Episiotomy:       Perineal Lacerations:   Repaired:      Periurethral Laceration:   Repaired:     Labial Laceration:   Repaired:     Sulcus Laceration:   Repaired:     Vaginal Laceration:   Repaired:     Cervical Laceration:   Repaired:     Repair suture:       Repair # of packets:       Last Value - EBL - Nursing (mL):       Sum - EBL - Nursing (mL): 0     Last Value - EBL - Anesthesia (mL):        Calculated QBL (mL):        Vaginal Sweep Performed:       Surgicount Correct:       Vaginal Packing:   Quantity:       Other providers:            Details (if applicable):  Trial of Labor      Categorization:      Priority:     Indications for :     Incision Type:       Additional  information:  Forceps:    Vacuum:    Breech:    Observed anomalies    Other (Comments):

## 2023-03-30 NOTE — PROGRESS NOTES
S: comfortable with epidural, headache resolved after fioricet  O:  VS reviewed, afebrile   Vitals:    03/30/23 0545 03/30/23 0600 03/30/23 0605 03/30/23 0615   BP: (!) 97/57 107/71 107/71 123/69   Pulse: 64 62 (!) 53 61   Resp:       Temp:       TempSrc:       SpO2: 96% (!) 94% 96% 95%       FHTs 140 Cat I reassuring, early decelerations   UC q4 mins  SVE 6/90/-2   Pitocin infusing at 20mu/min    A: IUP @ 39w1d ;     Patient Active Problem List   Diagnosis    Grand multiparity, with current pregnancy, antepartum    Obesity affecting pregnancy, antepartum    Positive pregnancy test    Antepartum multigravida of advanced maternal age    History of miscarriage, currently pregnant    Fall    Labile blood pressure    Pregnancy headache in third trimester    Mild intermittent asthma without complication       P:   Continue pitocin  Continue close monitoring of maternal/fetal status   Anticipate progress and NVD

## 2023-03-30 NOTE — LACTATION NOTE
Lactation Rounds:    Upon arrival infant feeding a bottle of formula. 13 mLs given.     Attempted to latch infant in football hold on the right breast. Infant is not interested in participating at this time.     Nipples appear to be inverted. Attempted to hand express, nipples further retract. Nipple shells for inverted nipples given to mother.     Instructions for use:  Assemble the shells by snapping the silicone back onto the plastic dome.  Insert foam pad inside of the dome to absorb leakage as needed.  Place assembled shell inside the bra with the hole in the silicone centered over the nipple.  The vent hole should be on the top.  Recommend to the mother  to wear a nursing bra with the shells.  Continue to wear shells between feedings until baby latches without difficulty consistently.    Only wear shells during waking hours.    If discomfort occurs, immediately discontinue the use of the shells and notify Lactation Department or MD.  Cleaning and sterilization:  Softshells should be sterilized daily while in the hospital using Medela Microsteam bag.    FOR HOME USE:  Sanitize soft shells daily with Medela MicroSteam bag or place  shells into boiling distilled water for 10 minutes.  Drain off the water and place parts on a clean cloth to dry before reapplication.  If there is leakage of breastmilk into the shells, remove shells and separate the 2 parts, wash with mild soap or detergent in warm water, rinse thoroughly in clean cold water and dry well.    If foam inserts are saturated discard and replace with clean cotton balls or dry gauze.    DO NOT feed the baby any milk collected in the shell or the foam insert.  Discard this milk.     Mother verbalizes understanding of expected  behaviors and output for the first 48 hours of life.  Discussed the importance of cue based feedings on demand, unrestricted access to the breast, and frequent uninterrupted skin to skin contact.      Mother denies  any further lactation needs or concerns at this time. Encouraged mother to call for assistance when desired or when infant is showing signs of hunger. Mother verbalizes understanding of all education and counseling.

## 2023-03-30 NOTE — PLAN OF CARE
Problem: Adult Inpatient Plan of Care  Goal: Plan of Care Review  Outcome: Ongoing, Progressing  Goal: Patient-Specific Goal (Individualized)  Outcome: Ongoing, Progressing  Goal: Absence of Hospital-Acquired Illness or Injury  Outcome: Ongoing, Progressing  Goal: Optimal Comfort and Wellbeing  Outcome: Ongoing, Progressing  Goal: Readiness for Transition of Care  Outcome: Ongoing, Progressing     Problem: Bariatric Environmental Safety  Goal: Safety Maintained with Care  Outcome: Ongoing, Progressing     Problem:  Fall Injury Risk  Goal: Absence of Fall, Infant Drop and Related Injury  Outcome: Ongoing, Progressing     Problem: Infection  Goal: Absence of Infection Signs and Symptoms  Outcome: Ongoing, Progressing     Problem: Bleeding (Labor)  Goal: Hemostasis  Outcome: Met     Problem: Change in Fetal Wellbeing (Labor)  Goal: Stable Fetal Wellbeing  Outcome: Met     Problem: Delayed Labor Progression (Labor)  Goal: Effective Progression to Delivery  Outcome: Met     Problem: Infection (Labor)  Goal: Absence of Infection Signs and Symptoms  Outcome: Met     Problem: Labor Pain (Labor)  Goal: Acceptable Pain Control  Outcome: Met     Problem: Uterine Tachysystole (Labor)  Goal: Normal Uterine Contraction Pattern  Outcome: Met     Problem: Skin Injury Risk Increased  Goal: Skin Health and Integrity  Outcome: Ongoing, Progressing

## 2023-03-30 NOTE — PROGRESS NOTES
03/30/23 0450   Tele[facility] Note - Strip   Strip Reviewed by Jerel Nurse? Yes   Tele[facility] Note - Communication   [place] Nurse Communicated with Bedside Nurse Regarding: Fetal Status   [facility] Note - Notification   Nurse Notified? Yes

## 2023-03-30 NOTE — SUBJECTIVE & OBJECTIVE
Interval History:  Pranay is a 38 y.o.  at 39w1d. She is doing well. Comfortable with epidural. C/o headache this am.    Objective:     Vital Signs (Most Recent):  Temp: 97.8 °F (36.6 °C) (23 0000)  Pulse: 61 (23 0615)  Resp: 18 (23 0515)  BP: 123/69 (23 0615)  SpO2: 95 % (23 0615) Vital Signs (24h Range):  Temp:  [97.8 °F (36.6 °C)-98.4 °F (36.9 °C)] 97.8 °F (36.6 °C)  Pulse:  [] 61  Resp:  [18-20] 18  SpO2:  [92 %-100 %] 95 %  BP: ()/() 123/69        There is no height or weight on file to calculate BMI.    FHT: 130 Cat 1 (reassuring)  TOCO:  Q 3 minutes    Cervical Exam:  Dilation:  4  Effacement:  80  Station: -2  Presentation: Vertex    AROM large amount of clear fluid     Significant Labs:  Lab Results   Component Value Date    GROUPTRH A POS 2023    HEPBSAG Negative 2022    STREPBCULT No Group B Streptococcus isolated 2023       I have personallly reviewed all pertinent lab results from the last 24 hours.  Recent Lab Results  (Last 5 results in the past 24 hours)        23  1249   23  1123   23  1030   23  1021   23  0858        Albumin       2.7         Alkaline Phosphatase       111         ALT       14         Anion Gap       10         AST       25         Baso #   0.03             Basophil %   0.6             BILIRUBIN TOTAL       0.3  Comment: For infants and newborns, interpretation of results should be based  on gestational age, weight and in agreement with clinical  observations.    Premature Infant recommended reference ranges:  Up to 24 hours.............<8.0 mg/dL  Up to 48 hours............<12.0 mg/dL  3-5 days..................<15.0 mg/dL  6-29 days.................<15.0 mg/dL           BUN       4         Calcium       8.4         Chloride       109         CO2       20         Creatinine       0.6         Creatinine, Urine     63.0     122.1       Differential Method   Automated              eGFR       >60         Eos #   0.1             Eosinophil %   1.5             Glucose       70         Gran # (ANC)   2.5             Gran %   54.9             Group & Rh A POS               Hematocrit   37.7             Hemoglobin   12.4             Immature Grans (Abs)   0.01  Comment: Mild elevation in immature granulocytes is non specific and   can be seen in a variety of conditions including stress response,   acute inflammation, trauma and pregnancy. Correlation with other   laboratory and clinical findings is essential.               Immature Granulocytes   0.2             INDIRECT RIAZ NEG               Lymph #   1.5             Lymph %   33.1             MCH   30.8             MCHC   32.9             MCV   94             Mono #   0.5             Mono %   9.7             MPV   10.7             nRBC   0             Platelets   240             Potassium       3.7         Prot/Creat Ratio, Urine     Unable to calculate     0.11       PROTEIN TOTAL       6.9         Protein, Urine Random     <7     13       RBC   4.02             RDW   14.9             Sodium       139         Specimen Outdate 04/01/2023 23:59               WBC   4.62                                    Physical Exam:   Constitutional: She is oriented to person, place, and time. Vital signs are normal. She appears well-developed and well-nourished. She is cooperative.    HENT:   Head: Normocephalic.      Cardiovascular:  Normal rate, regular rhythm and normal heart sounds.      Exam reveals edema (1+ BLE).        Pulmonary/Chest: Effort normal.        Abdominal: Soft.   Gravid, non-tender     Genitourinary:    Vagina and uterus normal.             Musculoskeletal: Normal range of motion and moves all extremeties.       Neurological: She is alert and oriented to person, place, and time. She has normal strength.    Skin: Skin is warm and dry. Capillary refill takes less than 2 seconds.    Psychiatric: She has a normal mood and affect. Her  speech is normal and behavior is normal. Judgment and thought content normal.     Review of Systems

## 2023-03-30 NOTE — PROGRESS NOTES
[facility] - Labor & Delivery  Obstetrics  Labor Progress Note    Patient Name: Pranay Malik  MRN: [id number]  Admission Date: 3/29/2023  Hospital Length of Stay: 0 days  Attending Physician: Cl Vale MD  Primary Care Provider: Primary Doctor No    Subjective:     Principal Problem:Labile blood pressure    Hospital Course:  OB triage  Serial BP's mostly normal with a few mildly elevated pressures. PIH work-up normal.  Reviewed r/b of proceeding with IOL due to AMA and labile BP's. Pt agreeable to proceeding with induction. PO Cytotec.     3/29/23--Pitocin infusing, desires NCB, anticipate progress and vaginal delivery       Interval History:  Pranay is a 38 y.o.  at 39w0d. She is doing well. Feeling the contractions.     Objective:     Vital Signs (Most Recent):  Temp: 98.4 °F (36.9 °C) (23 1603)  Pulse: (!) 59 (23 1803)  Resp: 18 (23 1603)  BP: 125/84 (23 1803)  SpO2: 98 % (23 1435)   Vital Signs (24h Range):  Temp:  [98 °F (36.7 °C)-98.4 °F (36.9 °C)] 98.4 °F (36.9 °C)  Pulse:  [54-84] 59  Resp:  [18] 18  SpO2:  [98 %-100 %] 98 %  BP: (120-143)/() 125/84        There is no height or weight on file to calculate BMI.    FHT: 125bpm with moderate variability and accelerations, no decelerations, Cat 1 (reassuring)  Has periods of cat 2 with minimal variability, no decelerations, resolves after 30min  TOCO:  Irregular    Cervical Exam:  Deferred at this time     Significant Labs:  Lab Results   Component Value Date    GROUPTRH A POS 2023    HEPBSAG Negative 2022    STREPBCULT No Group B Streptococcus isolated 2023       I have personallly reviewed all pertinent lab results from the last 24 hours.    Physical Exam:   Constitutional: She is oriented to person, place, and time. She appears well-developed and well-nourished.    HENT:   Head: Normocephalic.      Cardiovascular:  Normal rate.             Pulmonary/Chest: Effort normal.         Abdominal: Soft. There is no abdominal tenderness.   Gravid             Musculoskeletal: Normal range of motion and moves all extremeties.       Neurological: She is alert and oriented to person, place, and time.    Skin: Skin is warm and dry.    Psychiatric: She has a normal mood and affect. Her behavior is normal. Judgment and thought content normal.     Review of Systems    Assessment/Plan:     38 y.o. female  at 39w0d for:    * Labile blood pressure  Pitocin infusing, desires NCB, anticipate progress and vaginal delivery     Pregnancy headache in third trimester  OB triage  Serial BP's mostly normal with a few mildly elevated pressures. PIH work-up normal.  Reviewed r/b of proceeding with IOL due to AMA and labile BP's. Pt agreeable to proceeding with induction. PO Cytotec.   Fioricet PRN  Desires NCB  Continue to monitor maternal/fetal status closely  Anticipate progress and vaginal birth    Antepartum multigravida of advanced maternal age  Delivery 39-39.6 weeks per [facility] guidelines   37y/o at time of delivery     Obesity affecting pregnancy, antepartum  Lovenox PP  Pre-gravid BMI 41.53          Geremias Ricardo CNM  Obstetrics  [facility] - Labor & Delivery

## 2023-03-30 NOTE — ANESTHESIA PROCEDURE NOTES
Epidural    Patient location during procedure: OB   Reason for block: primary anesthetic   Reason for block: labor analgesia requested by patient and obstetrician  Diagnosis: IUP   Start time: 3/30/2023 3:20 AM  Timeout: 3/30/2023 3:20 AM  End time: 3/30/2023 3:36 AM  Surgery related to: Planned vaginal delivery    Staffing  Performing Provider: Roderick Martinez CRNA  Authorizing Provider: Yanick Rawls MD        Preanesthetic Checklist  Completed: patient identified, IV checked, site marked, risks and benefits discussed, surgical consent, monitors and equipment checked, pre-op evaluation, timeout performed, anesthesia consent given, hand hygiene performed and patient being monitored  Preparation  Patient position: sitting  Prep: Betadine  Patient monitoring: Pulse Ox and Blood Pressure  Reason for block: primary anesthetic   Epidural  Skin Anesthetic: lidocaine 1%  Skin Wheal: 3 mL  Administration type: continuous  Approach: midline  Interspace: L3-4    Injection technique: ANITA air  Needle and Epidural Catheter  Needle type: Tuohy   Needle gauge: 17  Needle length: 3.5 inches  Needle insertion depth: 10 cm  Catheter type: springwound and multi-orifice  Catheter size: 18 G  Catheter at skin depth: 18 cm  Insertion Attempts: 1  Test dose: 3 mL of lidocaine 1.5% with Epi 1-to-200,000  Additional Documentation: incremental injection, negative aspiration for heme and CSF, no paresthesia on injection, no signs/symptoms of IV or SA injection, no significant pain on injection and no significant complaints from patient  Needle localization: anatomical landmarks  Medications:  Volume per aspiration: 0 mL  Time between aspirations: 2 minutes   Assessment  Upper dermatomal levels - Left: T10  Right: T10   Dermatomal levels determined by pinch or prick  Ease of block: easy  Patient's tolerance of the procedure: comfortable throughout block and no complaints No inadvertent dural puncture with Tuohy.  Dural puncture not  performed with spinal needle

## 2023-03-30 NOTE — PROGRESS NOTES
[facility] - Labor & Delivery  Obstetrics  Labor Progress Note    Patient Name: Pranay Malik  MRN: [id number]  Admission Date: 3/29/2023  Hospital Length of Stay: 1 days  Attending Physician: Cl Vale MD  Primary Care Provider: Primary Doctor No    Subjective:     Principal Problem:Labile blood pressure    Hospital Course:  OB triage  Serial BP's mostly normal with a few mildly elevated pressures. PIH work-up normal.  Reviewed r/b of proceeding with IOL due to AMA and labile BP's. Pt agreeable to proceeding with induction. PO Cytotec.     3/29/23--Pitocin infusing, desires NCB, anticipate progress and vaginal delivery   3/30/23 0745 epidural in place, pitocin at 36mu/min, /-2, AROM large amount of clear fluid, will do pit break 30 mins and restart at half. Fioricet for headache, BP WNL.       Interval History:  Pranay is a 38 y.o.  at 39w1d. She is doing well. Comfortable with epidural. C/o headache this am.    Objective:     Vital Signs (Most Recent):  Temp: 97.8 °F (36.6 °C) (23 0000)  Pulse: 61 (23 0615)  Resp: 18 (23 0515)  BP: 123/69 (23 0615)  SpO2: 95 % (23 0615) Vital Signs (24h Range):  Temp:  [97.8 °F (36.6 °C)-98.4 °F (36.9 °C)] 97.8 °F (36.6 °C)  Pulse:  [] 61  Resp:  [18-20] 18  SpO2:  [92 %-100 %] 95 %  BP: ()/() 123/69        There is no height or weight on file to calculate BMI.    FHT: 130 Cat 1 (reassuring)  TOCO:  Q 3 minutes    Cervical Exam:  Dilation:  4  Effacement:  80  Station: -2  Presentation: Vertex    AROM large amount of clear fluid     Significant Labs:  Lab Results   Component Value Date    GROUPTRH A POS 2023    HEPBSAG Negative 2022    STREPBCULT No Group B Streptococcus isolated 2023       I have personallly reviewed all pertinent lab results from the last 24 hours.  Recent Lab Results  (Last 5 results in the past 24 hours)        23  1249   23  1123   23  1030    03/29/23  1021   03/29/23  0858        Albumin       2.7         Alkaline Phosphatase       111         ALT       14         Anion Gap       10         AST       25         Baso #   0.03             Basophil %   0.6             BILIRUBIN TOTAL       0.3  Comment: For infants and newborns, interpretation of results should be based  on gestational age, weight and in agreement with clinical  observations.    Premature Infant recommended reference ranges:  Up to 24 hours.............<8.0 mg/dL  Up to 48 hours............<12.0 mg/dL  3-5 days..................<15.0 mg/dL  6-29 days.................<15.0 mg/dL           BUN       4         Calcium       8.4         Chloride       109         CO2       20         Creatinine       0.6         Creatinine, Urine     63.0     122.1       Differential Method   Automated             eGFR       >60         Eos #   0.1             Eosinophil %   1.5             Glucose       70         Gran # (ANC)   2.5             Gran %   54.9             Group & Rh A POS               Hematocrit   37.7             Hemoglobin   12.4             Immature Grans (Abs)   0.01  Comment: Mild elevation in immature granulocytes is non specific and   can be seen in a variety of conditions including stress response,   acute inflammation, trauma and pregnancy. Correlation with other   laboratory and clinical findings is essential.               Immature Granulocytes   0.2             INDIRECT RIAZ NEG               Lymph #   1.5             Lymph %   33.1             MCH   30.8             MCHC   32.9             MCV   94             Mono #   0.5             Mono %   9.7             MPV   10.7             nRBC   0             Platelets   240             Potassium       3.7         Prot/Creat Ratio, Urine     Unable to calculate     0.11       PROTEIN TOTAL       6.9         Protein, Urine Random     <7     13       RBC   4.02             RDW   14.9             Sodium       139         Specimen Outdate  2023 23:59               WBC   4.62                                    Physical Exam:   Constitutional: She is oriented to person, place, and time. Vital signs are normal. She appears well-developed and well-nourished. She is cooperative.    HENT:   Head: Normocephalic.      Cardiovascular:  Normal rate, regular rhythm and normal heart sounds.      Exam reveals edema (1+ BLE).        Pulmonary/Chest: Effort normal.        Abdominal: Soft.   Gravid, non-tender     Genitourinary:    Vagina and uterus normal.             Musculoskeletal: Normal range of motion and moves all extremeties.       Neurological: She is alert and oriented to person, place, and time. She has normal strength.    Skin: Skin is warm and dry. Capillary refill takes less than 2 seconds.    Psychiatric: She has a normal mood and affect. Her speech is normal and behavior is normal. Judgment and thought content normal.     Review of Systems    Assessment/Plan:     38 y.o. female  at 39w1d for:    * Labile blood pressure  epidural in place, pitocin at 36mu/min, 4/80/-2, AROM large amount of clear fluid, will do pit break 30 mins and restart at half. Fioricet for headache, BP WNL.     Pregnancy headache in third trimester  fioricet PRN    Antepartum multigravida of advanced maternal age  Delivery 39-39.6 weeks per [facility] guidelines   39y/o at time of delivery     Obesity affecting pregnancy, antepartum  Lovenox PP  Pre-gravid BMI 41.53          Melissa Trivedi CNM  Obstetrics  [facility] - Labor & Delivery

## 2023-03-30 NOTE — ANESTHESIA PREPROCEDURE EVALUATION
03/30/2023  Pranay Malik is a 38 y.o., female.      Pre-op Assessment    I have reviewed the Patient Summary Reports.     I have reviewed the Nursing Notes.    I have reviewed the Medications.     Review of Systems  Anesthesia Hx:  Neg history of prior surgery. Denies Family Hx of Anesthesia complications.   Denies Personal Hx of Anesthesia complications.   Pulmonary:   Asthma    OB/GYN/PEDS:  Planned Vaginal Delivery    Neurological:   Headaches        Physical Exam  General: Well nourished, Cooperative and Alert    Airway:  Mallampati: II   Mouth Opening: Normal  TM Distance: Normal  Tongue: Normal  Neck ROM: Normal ROM    Dental:  Intact    Chest/Lungs:  Clear to auscultation, Normal Respiratory Rate    Heart:  Rate: Normal  Rhythm: Regular Rhythm  Sounds: Normal        Anesthesia Plan  Type of Anesthesia, risks & benefits discussed:    Anesthesia Type: Epidural  Intra-op Monitoring Plan: Standard ASA Monitors  Post Op Pain Control Plan: epidural analgesia  Informed Consent: Informed consent signed with the Patient and all parties understand the risks and agree with anesthesia plan.  All questions answered.   ASA Score: 2  Day of Surgery Review of History & Physical: H&P Update referred to the surgeon/provider.    Ready For Surgery From Anesthesia Perspective.     .

## 2023-03-30 NOTE — PLAN OF CARE
[facility]  Discharge Assessment    Primary Care Provider: Primary Doctor No     OB Screen (most recent)       OB Screen - 23 1141          OB SCREEN    Assessment Type Discharge Planning Assessment     Source of Information patient;health record     Received Prenatal Care Yes     Any indications/suspicions for None     Is this a teen pregnancy No     Is the baby in NICU No     Indication for adoption/[facility] No     Indication for DME/post-acute needs No     HIV (+) No     Any congenital  disorders No     Fetal demise/ death No

## 2023-03-30 NOTE — ANESTHESIA POSTPROCEDURE EVALUATION
Anesthesia Post Evaluation    Patient: Pranay Malik    Procedure(s) Performed: * No procedures listed *    Final Anesthesia Type: epidural      Patient location during evaluation: labor & delivery  Patient participation: Yes- Able to Participate  Level of consciousness: awake and alert  Post-procedure vital signs: reviewed and stable  Pain management: adequate  Airway patency: patent    PONV status at discharge: No PONV  Anesthetic complications: no      Cardiovascular status: blood pressure returned to baseline  Respiratory status: unassisted and spontaneous ventilation  Hydration status: euvolemic  Follow-up not needed.          Vitals Value Taken Time   /79 03/30/23 1148   Temp 36.6 °C (97.8 °F) 03/30/23 0000   Pulse 62 03/30/23 1148   Resp 18 03/30/23 0515   SpO2 96 % 03/30/23 1127   Vitals shown include unvalidated device data.      No case tracking events are documented in the log.      Pain/David Score: Pain Rating Prior to Med Admin: 0 (3/30/2023  1:00 PM)  Pain Rating Post Med Admin: 0 (3/30/2023  8:30 AM)

## 2023-03-31 PROBLEM — R51.9 PREGNANCY HEADACHE IN THIRD TRIMESTER: Status: RESOLVED | Noted: 2023-03-29 | Resolved: 2023-03-31

## 2023-03-31 PROBLEM — Z32.01 POSITIVE PREGNANCY TEST: Status: RESOLVED | Noted: 2021-12-29 | Resolved: 2023-03-31

## 2023-03-31 PROBLEM — O26.893 PREGNANCY HEADACHE IN THIRD TRIMESTER: Status: RESOLVED | Noted: 2023-03-29 | Resolved: 2023-03-31

## 2023-03-31 PROCEDURE — 25000003 PHARM REV CODE 250: Performed by: ADVANCED PRACTICE MIDWIFE

## 2023-03-31 PROCEDURE — 99231 SBSQ HOSP IP/OBS SF/LOW 25: CPT | Mod: ,,,

## 2023-03-31 PROCEDURE — 99231 PR SUBSEQUENT HOSPITAL CARE,LEVL I: ICD-10-PCS | Mod: ,,,

## 2023-03-31 PROCEDURE — 11000001 HC ACUTE MED/SURG PRIVATE ROOM

## 2023-03-31 RX ADMIN — IBUPROFEN 600 MG: 600 TABLET ORAL at 12:03

## 2023-03-31 RX ADMIN — IBUPROFEN 600 MG: 600 TABLET ORAL at 05:03

## 2023-03-31 RX ADMIN — PRENATAL VITAMINS-IRON FUMARATE 27 MG IRON-FOLIC ACID 0.8 MG TABLET 1 TABLET: at 08:03

## 2023-03-31 RX ADMIN — IBUPROFEN 600 MG: 600 TABLET ORAL at 11:03

## 2023-03-31 NOTE — PROGRESS NOTES
[facility] ([facility]  Obstetrics  Postpartum Progress Note    Patient Name: Pranay Malik  MRN: [id number]  Admission Date: 3/29/2023  Hospital Length of Stay: 2 days  Attending Physician: Cl Vale MD  Primary Care Provider: Primary Doctor No    Subjective:     Principal Problem: (normal spontaneous vaginal delivery)    Hospital Course:  OB triage  Serial BP's mostly normal with a few mildly elevated pressures. PIH work-up normal.  Reviewed r/b of proceeding with IOL due to AMA and labile BP's. Pt agreeable to proceeding with induction. PO Cytotec.     3/29/23--Pitocin infusing, desires NCB, anticipate progress and vaginal delivery   3/30/23 07 epidural in place, pitocin at 36mu/min, 80/-2, AROM large amount of clear fluid, will do pit break 30 mins and restart at half. Fioricet for headache, BP WNL.     3/31/23 PPD1: routine PP orders. BP stable 120/80s with some 140/80s. Will continue to monitor           Interval History:     She is doing well this morning. She is tolerating a regular diet without nausea or vomiting. She is voiding spontaneously. She is ambulating. She has passed flatus, and has not a BM. Vaginal bleeding is mild. She denies fever or chills. Abdominal pain is mild and controlled with oral medications. She Is breastfeeding. She desires circumcision for her male baby: not applicable.    Objective:     Vital Signs (Most Recent):  Temp: 98 °F (36.7 °C) (23)  Pulse: (!) 55 (23)  Resp: 18 (23)  BP: (!) 148/82 (23)  SpO2: 95 % (23 0411)   Vital Signs (24h Range):  Temp:  [97.6 °F (36.4 °C)-98.2 °F (36.8 °C)] 98 °F (36.7 °C)  Pulse:  [55-78] 55  Resp:  [17-18] 18  SpO2:  [94 %-99 %] 95 %  BP: (117-148)/(71-91) 148/82        There is no height or weight on file to calculate BMI.      Intake/Output Summary (Last 24 hours) at 3/31/2023 0833  Last data filed at 3/30/2023 1706  Gross per 24 hour   Intake 418.5 ml   Output  605 ml   Net -186.5 ml         Significant Labs:  Lab Results   Component Value Date    GROUPTRH A POS 2023    HEPBSAG Negative 2022    STREPBCULT No Group B Streptococcus isolated 2023     Recent Labs   Lab 23  1123   HGB 12.4   HCT 37.7       I have personallly reviewed all pertinent lab results from the last 24 hours.    Physical Exam:   Constitutional: She is oriented to person, place, and time. She appears well-developed and well-nourished.       Cardiovascular:  Normal rate.             Pulmonary/Chest: Effort normal. No respiratory distress.        Abdominal: Soft.     Genitourinary:    Uterus normal.             Musculoskeletal: Moves all extremeties.       Neurological: She is alert and oriented to person, place, and time.    Skin: Skin is warm and dry.    Psychiatric: She has a normal mood and affect.     Review of Systems    Assessment/Plan:     38 y.o. female  for:    *  (normal spontaneous vaginal delivery)  Routine PP care    Single live birth  Single live female under care of peds    Labile blood pressure  epidural in place, pitocin at 36mu/min, 4/80/-2, AROM large amount of clear fluid, will do pit break 30 mins and restart at half. Fioricet for headache, BP WNL.     Continue to monitor    Antepartum multigravida of advanced maternal age  Delivery 39-39.6 weeks per [facility] guidelines   39y/o at time of delivery     Obesity affecting pregnancy, antepartum  Lovenox PP  Pre-gravid BMI 41.53        Disposition: As patient meets milestones, will plan to discharge tomorrow.    Chen Wade CNM  Obstetrics  [facility] ([facility])

## 2023-03-31 NOTE — PLAN OF CARE
[employer] Symphony breast pump set up at bedside.  Instructed on proper usage and to adjust suction according to comfort level. Verified appropriate flange fit. Reviewed frequency and duration of pumping in order to promote and maintain full milk supply. Hands-on pumping technique reviewed. Encouraged hand expression after. Instructed on proper cleaning of breast pump parts. Reviewed proper milk handling, collection, storage, and transportation. Voices understanding.

## 2023-03-31 NOTE — LACTATION NOTE
Lactation rounds: Infant output and weight loss WNL    Mother reports that pumping is going well although she is only collecting drops at this time. She denies pain and discomfort with pumping and states that she pumps on Maintain phase every 2-3 hours. Mother reports that she is putting infant to breast every now and then. She is formula feeding infant until she collects milk to provide.    Reviewed proper usage and to adjust suction according to comfort level. Reviewed with mother frequency and duration of pumping in order to promote and maintain full milk supply. Hands on pumping technique reviewed. . Instructed mother on cleaning of breast pump parts. Reviewed proper milk handling, collection, storage, and transportation. Voices understanding.      LDH form signed and faxed to [facility] at this time to obtain breast pump through her insurance.    Mother denies any further lactation needs or concerns at this time. Encouraged mother to call for assistance when desired or when infant is showing signs of hunger. Lactation availability discussed. Mother verbalizes understanding of all education and counseling.

## 2023-03-31 NOTE — SUBJECTIVE & OBJECTIVE
Interval History:     She is doing well this morning. She is tolerating a regular diet without nausea or vomiting. She is voiding spontaneously. She is ambulating. She has passed flatus, and has not a BM. Vaginal bleeding is mild. She denies fever or chills. Abdominal pain is mild and controlled with oral medications. She Is breastfeeding. She desires circumcision for her male baby: not applicable.    Objective:     Vital Signs (Most Recent):  Temp: 98 °F (36.7 °C) (03/31/23 0729)  Pulse: (!) 55 (03/31/23 0729)  Resp: 18 (03/31/23 0729)  BP: (!) 148/82 (03/31/23 0729)  SpO2: 95 % (03/31/23 0411)   Vital Signs (24h Range):  Temp:  [97.6 °F (36.4 °C)-98.2 °F (36.8 °C)] 98 °F (36.7 °C)  Pulse:  [55-78] 55  Resp:  [17-18] 18  SpO2:  [94 %-99 %] 95 %  BP: (117-148)/(71-91) 148/82        There is no height or weight on file to calculate BMI.      Intake/Output Summary (Last 24 hours) at 3/31/2023 0833  Last data filed at 3/30/2023 1706  Gross per 24 hour   Intake 418.5 ml   Output 605 ml   Net -186.5 ml         Significant Labs:  Lab Results   Component Value Date    GROUPTRH A POS 03/29/2023    HEPBSAG Negative 08/26/2022    STREPBCULT No Group B Streptococcus isolated 03/08/2023     Recent Labs   Lab 03/29/23  1123   HGB 12.4   HCT 37.7       I have personallly reviewed all pertinent lab results from the last 24 hours.    Physical Exam:   Constitutional: She is oriented to person, place, and time. She appears well-developed and well-nourished.       Cardiovascular:  Normal rate.             Pulmonary/Chest: Effort normal. No respiratory distress.        Abdominal: Soft.     Genitourinary:    Uterus normal.             Musculoskeletal: Moves all extremeties.       Neurological: She is alert and oriented to person, place, and time.    Skin: Skin is warm and dry.    Psychiatric: She has a normal mood and affect.     Review of Systems

## 2023-03-31 NOTE — ASSESSMENT & PLAN NOTE
epidural in place, pitocin at 36mu/min, 4/80/-2, AROM large amount of clear fluid, will do pit break 30 mins and restart at half. Fioricet for headache, BP WNL.     Continue to monitor

## 2023-04-01 VITALS
RESPIRATION RATE: 18 BRPM | DIASTOLIC BLOOD PRESSURE: 76 MMHG | SYSTOLIC BLOOD PRESSURE: 134 MMHG | HEART RATE: 55 BPM | TEMPERATURE: 98 F | OXYGEN SATURATION: 95 %

## 2023-04-01 PROCEDURE — 99238 HOSP IP/OBS DSCHRG MGMT 30/<: CPT | Mod: ,,,

## 2023-04-01 PROCEDURE — 99238 PR HOSPITAL DISCHARGE DAY,<30 MIN: ICD-10-PCS | Mod: ,,,

## 2023-04-01 PROCEDURE — 25000003 PHARM REV CODE 250: Performed by: ADVANCED PRACTICE MIDWIFE

## 2023-04-01 RX ORDER — IBUPROFEN 600 MG/1
600 TABLET ORAL EVERY 6 HOURS PRN
Qty: 60 TABLET | Refills: 0 | Status: SHIPPED | OUTPATIENT
Start: 2023-04-01

## 2023-04-01 RX ADMIN — PRENATAL VITAMINS-IRON FUMARATE 27 MG IRON-FOLIC ACID 0.8 MG TABLET 1 TABLET: at 08:04

## 2023-04-01 RX ADMIN — IBUPROFEN 600 MG: 600 TABLET ORAL at 06:04

## 2023-04-01 RX ADMIN — IBUPROFEN 600 MG: 600 TABLET ORAL at 11:04

## 2023-04-01 NOTE — PLAN OF CARE
Patient afebrile and had no falls this shift. Fundus firm without massage and below umbilicus. Bleeding light, no clots passed this shift. Voids spontaneously. Ambulates independently. Pain well controlled with oral pain medication. Vital signs stable at this time. Bonding well with infant; responds to infant cues and participates in infant care. AVS sheet reviewed. Educated on self care, follow up appointments, and signs and symptoms of preeclampsia. Verbalized understanding. Ready for discharge.

## 2023-04-01 NOTE — DISCHARGE SUMMARY
[facility] (Hospital)  Obstetrics  Discharge Summary      Patient Name: Pranay Malik  MRN: [id number]  Admission Date: 3/29/2023  Hospital Length of Stay: 3 days  Discharge Date and Time:  2023 11:28 AM  Attending Physician: Cl Vale MD   Discharging Provider: Chen Wade CNM   Primary Care Provider: Primary Doctor No    HPI:  39w0d sent from office for pre-e work-up          * No surgery found *     Hospital Course:   OB triage  Serial BP's mostly normal with a few mildly elevated pressures. PIH work-up normal.  Reviewed r/b of proceeding with IOL due to AMA and labile BP's. Pt agreeable to proceeding with induction. PO Cytotec.     3/29/23--Pitocin infusing, desires NCB, anticipate progress and vaginal delivery   3/30/23 0745 epidural in place, pitocin at 36mu/min, 4/80/-2, AROM large amount of clear fluid, will do pit break 30 mins and restart at half. Fioricet for headache, BP WNL.     3/31/23 PPD1: routine PP orders. BP stable 120/80s with some 140/80s. Will continue to monitor     23 PPD2: routine PP discharge orders given including Please notify if experiencing increased vaginal bleeding, dizziness, headache, blurred vision, and/or signs of postpartum depression.  BP stable. Will schedule 1 week BP check. patient verbalized understanding               Final Active Diagnoses:    Diagnosis Date Noted POA    PRINCIPAL PROBLEM:   (normal spontaneous vaginal delivery) [O80] 2023 Not Applicable    Single live birth [Z37.0] 2023 Not Applicable    Labile blood pressure [R09.89] 2023 Yes    Obesity affecting pregnancy, antepartum [O99.210] 2021 Yes      Problems Resolved During this Admission:    Diagnosis Date Noted Date Resolved POA    Pregnancy headache in third trimester [O26.893, R51.9] 2023 Yes        Significant Diagnostic Studies: Labs: All labs within the past 24 hours have been reviewed      Feeding Method:  breast    Immunizations     Date Immunization Status Dose Route/Site Given by    03/30/23 1232 MMR Incomplete 0.5 mL Subcutaneous/     03/30/23 1232 Tdap Incomplete 0.5 mL Intramuscular/           Delivery:    Episiotomy: None   Lacerations: None   Repair suture: None   Repair # of packets:     Blood loss (ml):       Birth information:  YOB: 2025   Time of birth: 11:10 AM   Sex: female   Delivery type: Vaginal, Spontaneous   Gestational Age: 39w1d    Delivery Clinician:      Other providers:       Additional  information:  Forceps:    Vacuum:    Breech:    Observed anomalies      Living?:           APGARS  One minute Five minutes Ten minutes   Skin color:         Heart rate:         Grimace:         Muscle tone:         Breathing:         Totals: 9  9        Placenta: Delivered:       appearance    Pending Diagnostic Studies:     None          Discharged Condition: good    Disposition: Home or Self Care    Follow Up:    Patient Instructions:      Pelvic Rest     Notify your health care provider if you experience any of the following:  temperature >100.4     Notify your health care provider if you experience any of the following:  persistent nausea and vomiting or diarrhea     Notify your health care provider if you experience any of the following:  severe uncontrolled pain     Notify your health care provider if you experience any of the following:  difficulty breathing or increased cough     Notify your health care provider if you experience any of the following:  severe persistent headache     Notify your health care provider if you experience any of the following:  persistent dizziness, light-headedness, or visual disturbances     Notify your health care provider if you experience any of the following:   Order Comments: Please notify if experiencing increased vaginal bleeding, dizziness, headache, blurred vision, and/or signs of postpartum depression     Notify your health care provider if you  experience any of the following:  increased confusion or weakness     Activity as tolerated     Medications:  Current Discharge Medication List      START taking these medications    Details   ibuprofen (ADVIL,MOTRIN) 600 MG tablet Take 1 tablet (600 mg total) by mouth every 6 (six) hours as needed for Pain.  Qty: 60 tablet, Refills: 0         CONTINUE these medications which have NOT CHANGED    Details   albuterol (PROVENTIL/VENTOLIN HFA) 90 mcg/actuation inhaler Inhale 2 puffs into the lungs every 6 (six) hours as needed.      SYMBICORT 80-4.5 mcg/actuation HFAA Inhale 1 puff into the lungs once daily.             Chen Wade CNM  Obstetrics  [facility] ([facility])

## 2023-04-01 NOTE — PROGRESS NOTES
[facility] ([facility])  Obstetrics  Postpartum Progress Note    Patient Name: Pranay Malik  MRN: [id number]  Admission Date: 3/29/2023  Hospital Length of Stay: 3 days  Attending Physician: Cl Vale MD  Primary Care Provider: Primary Doctor No    Subjective:     Principal Problem: (normal spontaneous vaginal delivery)    Hospital Course:  OB triage  Serial BP's mostly normal with a few mildly elevated pressures. PIH work-up normal.  Reviewed r/b of proceeding with IOL due to AMA and labile BP's. Pt agreeable to proceeding with induction. PO Cytotec.     3/29/23--Pitocin infusing, desires NCB, anticipate progress and vaginal delivery   3/30/23 0745 epidural in place, pitocin at 36mu/min, /-2, AROM large amount of clear fluid, will do pit break 30 mins and restart at half. Fioricet for headache, BP WNL.     3/31/23 PPD1: routine PP orders. BP stable 120/80s with some 140/80s. Will continue to monitor     23 PPD2: routine PP discharge orders given including Please notify if experiencing increased vaginal bleeding, dizziness, headache, blurred vision, and/or signs of postpartum depression.  BP stable. Will schedule 1 week BP check. patient verbalized understanding          Interval History:     She is doing well this morning. She is tolerating a regular diet without nausea or vomiting. She is voiding spontaneously. She is ambulating. She has passed flatus, and has a BM. Vaginal bleeding is mild. She denies fever or chills. Abdominal pain is mild and controlled with oral medications. She Is breastfeeding. She desires circumcision for her male baby: not applicable.    Objective:     Vital Signs (Most Recent):  Temp: 97.9 °F (36.6 °C) (23)  Pulse: (!) 55 (23)  Resp: 18 (23)  BP: 134/76 (23)  SpO2: 95 % (23 0411)   Vital Signs (24h Range):  Temp:  [97.9 °F (36.6 °C)-98.3 °F (36.8 °C)] 97.9 °F (36.6 °C)  Pulse:  [55-66] 55  Resp:   [18-19] 18  BP: (130-142)/(76-87) 134/76        There is no height or weight on file to calculate BMI.    No intake or output data in the 24 hours ending 23 1126      Significant Labs:  Lab Results   Component Value Date    GROUPTRH A POS 2023    HEPBSAG Negative 2022    STREPBCULT No Group B Streptococcus isolated 2023     No results for input(s): HGB, HCT in the last 48 hours.    I have personallly reviewed all pertinent lab results from the last 24 hours.    Physical Exam:   Constitutional: She is oriented to person, place, and time. She appears well-developed and well-nourished.       Cardiovascular:  Normal rate.             Pulmonary/Chest: Effort normal. No respiratory distress.        Abdominal: Soft.     Genitourinary:    Uterus normal.             Musculoskeletal: Moves all extremeties. No edema.       Neurological: She is alert and oriented to person, place, and time.    Skin: Skin is warm and dry.    Psychiatric: She has a normal mood and affect.     Review of Systems    Assessment/Plan:     38 y.o. female  for:    *  (normal spontaneous vaginal delivery)  Routine PP care    Single live birth  Single live female under care of peds    Labile blood pressure  epidural in place, pitocin at 36mu/min, 4/80/-2, AROM large amount of clear fluid, will do pit break 30 mins and restart at half. Fioricet for headache, BP WNL.     Continue to monitor    Antepartum multigravida of advanced maternal age  Delivery 39-39.6 weeks per [facility] guidelines   37y/o at time of delivery     Obesity affecting pregnancy, antepartum  Lovenox PP  Pre-gravid BMI 41.53        Disposition: As patient meets milestones, will plan to discharge today.    Chen Wade CNM  Obstetrics  [facility] ([facility])

## 2023-04-01 NOTE — SUBJECTIVE & OBJECTIVE
Interval History:     She is doing well this morning. She is tolerating a regular diet without nausea or vomiting. She is voiding spontaneously. She is ambulating. She has passed flatus, and has a BM. Vaginal bleeding is mild. She denies fever or chills. Abdominal pain is mild and controlled with oral medications. She Is breastfeeding. She desires circumcision for her male baby: not applicable.    Objective:     Vital Signs (Most Recent):  Temp: 97.9 °F (36.6 °C) (04/01/23 0833)  Pulse: (!) 55 (04/01/23 0833)  Resp: 18 (04/01/23 0833)  BP: 134/76 (04/01/23 0833)  SpO2: 95 % (03/31/23 0411)   Vital Signs (24h Range):  Temp:  [97.9 °F (36.6 °C)-98.3 °F (36.8 °C)] 97.9 °F (36.6 °C)  Pulse:  [55-66] 55  Resp:  [18-19] 18  BP: (130-142)/(76-87) 134/76        There is no height or weight on file to calculate BMI.    No intake or output data in the 24 hours ending 04/01/23 1126      Significant Labs:  Lab Results   Component Value Date    GROUPTRH A POS 03/29/2023    HEPBSAG Negative 08/26/2022    STREPBCULT No Group B Streptococcus isolated 03/08/2023     No results for input(s): HGB, HCT in the last 48 hours.    I have personallly reviewed all pertinent lab results from the last 24 hours.    Physical Exam:   Constitutional: She is oriented to person, place, and time. She appears well-developed and well-nourished.       Cardiovascular:  Normal rate.             Pulmonary/Chest: Effort normal. No respiratory distress.        Abdominal: Soft.     Genitourinary:    Uterus normal.             Musculoskeletal: Moves all extremeties. No edema.       Neurological: She is alert and oriented to person, place, and time.    Skin: Skin is warm and dry.    Psychiatric: She has a normal mood and affect.     Review of Systems

## 2023-04-01 NOTE — LACTATION NOTE
Lactation Rounding: infant has had no feedings at the breast. Output WNL    Upon entering room, nurse finds infant being formula fed by father and mother packing for discharge. Discussed mechanism of milk production and maintenance. Encouraged frequent feeds based on early cues, unrestricted access to the breast and frequent skin to skin contact. Discussed expected feeding and output pattern for day of life 2. Reinforced normalcy and importance of cluster feeding.     Mother anticipates discharge home today. Reviewed signs of good attachment. Reviewed breast massage and compression during feedings and indications for use. Reviewed signs of effective milk transfer and instructed to call pediatrician and lactation if signs not present. Discussed expected feeding and output pattern for days of life 2, 3, 4, & 5+; mother instructed to call pediatrician and lactation if infant is not meeting feeding and output goals.     Reviewed signs of engorgement and expectant management. Reviewed signs of mastitis and instructed mother to call OB provider and lactation if any signs present. Discussed proper use of First Alert Form. Reviewed proper milk handling, collection and storage guidelines. Reviewed nursing diet and nutrition. Discussed resources for medication safety while breastfeeding. Reviewed available outpatient lactation resources.     Mother verbalized understanding of all teaching and counseling at this time. Opportunity for questions given to mom. Mother verbalized no concerns at this time. Lactation contact information given to mother. Nurse instructed mother to call for assistance if need arises.

## 2023-04-01 NOTE — DISCHARGE INSTRUCTIONS
"Mother Self Care:    Activity: Avoid strenuous exercise and get adequate rest.  No driving until the physician consent given.  Emotional Changes: Most women find birth to be a time of great emotional upheaval.  Sense of loss, mood swings, fatigue, anxiety, and feeling "let down" are common.  If feelings worsen or last more than a week, call your physician.  Breast Care/Breastfeeding: Wear a bra for comfort.  Keep nipples dry and apply your own breast milk or lanolin cream as needed for soreness.  Engorgement can be relieved with warm, moist heat before feedings.  You may also take Ibuprofen.  Fartun-Care/Vaginal Bleeding: Remember to use your fartun-bottle after urinating.  Your flow will change from red, to pink, to yellow/white color over a period of 2 weeks.  Menstruation will return in 3-8 weeks, or longer if breastfeeding.  Sexual Activity/Pelvic Rest: No sexual activity, tampons, or douching until your physician gives you consent.  Diet: Continue to eat from the five basic food groups, including plenty of protein, fruits, vegetables, and whole grains.  Limit empty calories and high fat foods.  Drink enough fluids to satisfy thirst and add an extra 500 calories for breastfeeding.  Constipation/Hemorrhoids: Drink plenty of water.  You may take a stool softener or natural laxative (Metamucil). You may use tucks or hemorrhoid ointment and soak in a warm tub.    CALL YOUR OB DOCTOR IF ANY OF THE FOLLOWING OCCURS:  *Heavy bleeding - saturating a pad an hour or passing any large (2-3 inches in size) blood clots.  *Any pain, redness, or tenderness in lower leg.  *You cannot care for yourself or your baby.  *Any signs of infection-      - Temperature greater than 100.5 degrees F      - Foul smelling vaginal discharge and/or incisional drainage      - Increased episiotomy or incisional pain      - Hot, hard, red or sore area on breast      - Flu-like symptoms      - Any urgency, frequency or burning with urination    Return " To the Hospital for further Evaluation:  Headache not relieved by tylenol or ibuprofen  Blurry vision, double vision, seeing spots, or flashing lights  Feeling faint or passing out  Right epigastric pain  Difficulty breathing  Swelling in hands, face, or feet  Any of these symptoms accompanied by nausea/vomiting  Gaining more than 5 pounds in one week  Seizures  These symptoms could be an indication of elevated blood pressure.       If you have any questions that need to be answered immediately please call the Labor & Delivery Unit at [phone] and ask to speak to a nurse.

## 2023-12-25 PROBLEM — O09.299 HISTORY OF MISCARRIAGE, CURRENTLY PREGNANT: Status: RESOLVED | Noted: 2022-08-26 | Resolved: 2023-12-25

## 2024-01-30 ENCOUNTER — LAB VISIT (OUTPATIENT)
Dept: LAB | Facility: HOSPITAL | Age: 39
End: 2024-01-30
Attending: ADVANCED PRACTICE MIDWIFE
Payer: MEDICAID

## 2024-01-30 ENCOUNTER — OFFICE VISIT (OUTPATIENT)
Dept: OBSTETRICS AND GYNECOLOGY | Facility: CLINIC | Age: 39
End: 2024-01-30
Payer: MEDICAID

## 2024-01-30 VITALS — WEIGHT: 293 LBS | DIASTOLIC BLOOD PRESSURE: 78 MMHG | BODY MASS INDEX: 43.43 KG/M2 | SYSTOLIC BLOOD PRESSURE: 122 MMHG

## 2024-01-30 DIAGNOSIS — O09.529 ANTEPARTUM MULTIGRAVIDA OF ADVANCED MATERNAL AGE: ICD-10-CM

## 2024-01-30 DIAGNOSIS — O09.529 ANTEPARTUM MULTIGRAVIDA OF ADVANCED MATERNAL AGE: Primary | ICD-10-CM

## 2024-01-30 LAB
ABO + RH BLD: NORMAL
ALBUMIN SERPL BCP-MCNC: 3 G/DL (ref 3.5–5.2)
ALP SERPL-CCNC: 41 U/L (ref 55–135)
ALT SERPL W/O P-5'-P-CCNC: 9 U/L (ref 10–44)
ANION GAP SERPL CALC-SCNC: 8 MMOL/L (ref 8–16)
AST SERPL-CCNC: 14 U/L (ref 10–40)
BASOPHILS # BLD AUTO: 0.02 K/UL (ref 0–0.2)
BASOPHILS NFR BLD: 0.5 % (ref 0–1.9)
BILIRUB SERPL-MCNC: 0.2 MG/DL (ref 0.1–1)
BILIRUB UR QL STRIP: NEGATIVE
BLD GP AB SCN CELLS X3 SERPL QL: NORMAL
BUN SERPL-MCNC: 5 MG/DL (ref 6–20)
CALCIUM SERPL-MCNC: 9.2 MG/DL (ref 8.7–10.5)
CHLORIDE SERPL-SCNC: 106 MMOL/L (ref 95–110)
CLARITY UR REFRACT.AUTO: CLEAR
CO2 SERPL-SCNC: 21 MMOL/L (ref 23–29)
COLOR UR AUTO: YELLOW
CREAT SERPL-MCNC: 0.7 MG/DL (ref 0.5–1.4)
CREAT UR-MCNC: 180.6 MG/DL (ref 15–325)
DIFFERENTIAL METHOD BLD: ABNORMAL
EOSINOPHIL # BLD AUTO: 0.1 K/UL (ref 0–0.5)
EOSINOPHIL NFR BLD: 1.8 % (ref 0–8)
ERYTHROCYTE [DISTWIDTH] IN BLOOD BY AUTOMATED COUNT: 13.7 % (ref 11.5–14.5)
EST. GFR  (NO RACE VARIABLE): >60 ML/MIN/1.73 M^2
ESTIMATED AVG GLUCOSE: 100 MG/DL (ref 68–131)
GLUCOSE SERPL-MCNC: 71 MG/DL (ref 70–110)
GLUCOSE UR QL STRIP: NEGATIVE
HAV IGM SERPL QL IA: NORMAL
HBA1C MFR BLD: 5.1 % (ref 4–5.6)
HBV CORE IGM SERPL QL IA: NORMAL
HBV SURFACE AG SERPL QL IA: NORMAL
HCT VFR BLD AUTO: 39.9 % (ref 37–48.5)
HCV AB SERPL QL IA: NORMAL
HGB BLD-MCNC: 13.1 G/DL (ref 12–16)
HGB S BLD QL SOLY: NEGATIVE
HGB UR QL STRIP: NEGATIVE
HIV 1+2 AB+HIV1 P24 AG SERPL QL IA: NORMAL
IMM GRANULOCYTES # BLD AUTO: 0.01 K/UL (ref 0–0.04)
IMM GRANULOCYTES NFR BLD AUTO: 0.3 % (ref 0–0.5)
KETONES UR QL STRIP: NEGATIVE
LEUKOCYTE ESTERASE UR QL STRIP: NEGATIVE
LYMPHOCYTES # BLD AUTO: 1.4 K/UL (ref 1–4.8)
LYMPHOCYTES NFR BLD: 36.2 % (ref 18–48)
MCH RBC QN AUTO: 32.7 PG (ref 27–31)
MCHC RBC AUTO-ENTMCNC: 32.8 G/DL (ref 32–36)
MCV RBC AUTO: 100 FL (ref 82–98)
MONOCYTES # BLD AUTO: 0.3 K/UL (ref 0.3–1)
MONOCYTES NFR BLD: 7.2 % (ref 4–15)
NEUTROPHILS # BLD AUTO: 2.1 K/UL (ref 1.8–7.7)
NEUTROPHILS NFR BLD: 54 % (ref 38–73)
NITRITE UR QL STRIP: NEGATIVE
NRBC BLD-RTO: 0 /100 WBC
PH UR STRIP: 7 [PH] (ref 5–8)
PLATELET # BLD AUTO: 261 K/UL (ref 150–450)
PMV BLD AUTO: 11 FL (ref 9.2–12.9)
POTASSIUM SERPL-SCNC: 3.6 MMOL/L (ref 3.5–5.1)
PROT SERPL-MCNC: 7.1 G/DL (ref 6–8.4)
PROT UR QL STRIP: ABNORMAL
PROT UR-MCNC: 10 MG/DL (ref 0–15)
PROT/CREAT UR: 0.06 MG/G{CREAT} (ref 0–0.2)
RBC # BLD AUTO: 4.01 M/UL (ref 4–5.4)
SODIUM SERPL-SCNC: 135 MMOL/L (ref 136–145)
SP GR UR STRIP: 1.02 (ref 1–1.03)
SPECIMEN OUTDATE: NORMAL
URN SPEC COLLECT METH UR: ABNORMAL
WBC # BLD AUTO: 3.87 K/UL (ref 3.9–12.7)

## 2024-01-30 PROCEDURE — 81003 URINALYSIS AUTO W/O SCOPE: CPT | Performed by: ADVANCED PRACTICE MIDWIFE

## 2024-01-30 PROCEDURE — 36415 COLL VENOUS BLD VENIPUNCTURE: CPT | Performed by: ADVANCED PRACTICE MIDWIFE

## 2024-01-30 PROCEDURE — 82570 ASSAY OF URINE CREATININE: CPT | Performed by: ADVANCED PRACTICE MIDWIFE

## 2024-01-30 PROCEDURE — 88175 CYTOPATH C/V AUTO FLUID REDO: CPT | Performed by: ADVANCED PRACTICE MIDWIFE

## 2024-01-30 PROCEDURE — 87624 HPV HI-RISK TYP POOLED RSLT: CPT | Performed by: ADVANCED PRACTICE MIDWIFE

## 2024-01-30 PROCEDURE — 99213 OFFICE O/P EST LOW 20 MIN: CPT | Mod: PBBFAC,TH,PN | Performed by: ADVANCED PRACTICE MIDWIFE

## 2024-01-30 PROCEDURE — 86592 SYPHILIS TEST NON-TREP QUAL: CPT | Performed by: ADVANCED PRACTICE MIDWIFE

## 2024-01-30 PROCEDURE — 87389 HIV-1 AG W/HIV-1&-2 AB AG IA: CPT | Performed by: ADVANCED PRACTICE MIDWIFE

## 2024-01-30 PROCEDURE — 85025 COMPLETE CBC W/AUTO DIFF WBC: CPT | Performed by: ADVANCED PRACTICE MIDWIFE

## 2024-01-30 PROCEDURE — 80053 COMPREHEN METABOLIC PANEL: CPT | Performed by: ADVANCED PRACTICE MIDWIFE

## 2024-01-30 PROCEDURE — 99999 PR PBB SHADOW E&M-EST. PATIENT-LVL III: CPT | Mod: PBBFAC,,, | Performed by: ADVANCED PRACTICE MIDWIFE

## 2024-01-30 PROCEDURE — 87491 CHLMYD TRACH DNA AMP PROBE: CPT | Performed by: ADVANCED PRACTICE MIDWIFE

## 2024-01-30 PROCEDURE — 83036 HEMOGLOBIN GLYCOSYLATED A1C: CPT | Performed by: ADVANCED PRACTICE MIDWIFE

## 2024-01-30 PROCEDURE — 86901 BLOOD TYPING SEROLOGIC RH(D): CPT | Performed by: ADVANCED PRACTICE MIDWIFE

## 2024-01-30 PROCEDURE — 3074F SYST BP LT 130 MM HG: CPT | Mod: CPTII,,, | Performed by: ADVANCED PRACTICE MIDWIFE

## 2024-01-30 PROCEDURE — 1159F MED LIST DOCD IN RCRD: CPT | Mod: CPTII,,, | Performed by: ADVANCED PRACTICE MIDWIFE

## 2024-01-30 PROCEDURE — 99214 OFFICE O/P EST MOD 30 MIN: CPT | Mod: S$PBB,TH,, | Performed by: ADVANCED PRACTICE MIDWIFE

## 2024-01-30 PROCEDURE — 3008F BODY MASS INDEX DOCD: CPT | Mod: CPTII,,, | Performed by: ADVANCED PRACTICE MIDWIFE

## 2024-01-30 PROCEDURE — 85660 RBC SICKLE CELL TEST: CPT | Performed by: ADVANCED PRACTICE MIDWIFE

## 2024-01-30 PROCEDURE — 86762 RUBELLA ANTIBODY: CPT | Performed by: ADVANCED PRACTICE MIDWIFE

## 2024-01-30 PROCEDURE — 80074 ACUTE HEPATITIS PANEL: CPT | Performed by: ADVANCED PRACTICE MIDWIFE

## 2024-01-30 PROCEDURE — 3044F HG A1C LEVEL LT 7.0%: CPT | Mod: CPTII,,, | Performed by: ADVANCED PRACTICE MIDWIFE

## 2024-01-30 PROCEDURE — 3078F DIAST BP <80 MM HG: CPT | Mod: CPTII,,, | Performed by: ADVANCED PRACTICE MIDWIFE

## 2024-01-30 NOTE — PROGRESS NOTES
CC: Absence of menses risk assessment Kerrie Malik is a 38 y.o. female  presents with complaint of absence of menstruation.  She denies nausea/vomIting/abdominal pain/bleeding.  UPT is positive.    Menstrual History    LMP uncertain, EDC answer, therefore thought to be first-trimester    Past Medical History:   Diagnosis Date    Abnormal Pap smear 2005    colposcopy    Abnormal Pap smear of cervix         Asthma affecting pregnancy, antepartum 3/12/2018    Miscarriage      Past Surgical History:   Procedure Laterality Date    Right Knee       Social History     Socioeconomic History    Marital status:    Tobacco Use    Smoking status: Never     Passive exposure: Never    Smokeless tobacco: Never   Substance and Sexual Activity    Alcohol use: No    Drug use: No    Sexual activity: Yes     Partners: Male     Birth control/protection: None     Family History   Problem Relation Age of Onset    Breast cancer Neg Hx     Colon cancer Neg Hx     Ovarian cancer Neg Hx     Stroke Neg Hx     Thrombosis Neg Hx      OB History    Para Term  AB Living   9 5 5 0 4 5   SAB IAB Ectopic Multiple Live Births   4 0 0 0 5      # Outcome Date GA Lbr Drake/2nd Weight Sex Delivery Anes PTL Lv   9 Term 23 39w1d / 00:05 3.19 kg (7 lb 0.5 oz) F Vag-Spont EPI N NOAM   8 SAB 05/10/20           7 Term 18 39w6d / 00:05 3.47 kg (7 lb 10.4 oz) F Vag-Spont EPI N NOAM   6 Term 02/10/15 40w0d  3.912 kg (8 lb 10 oz) F Vag-Spont EPI N NOAM   5 Term 03 40w0d 01:00 3.969 kg (8 lb 12 oz) F Vag-Spont None N NOAM   4 Term 99 40w0d 12:00 4.252 kg (9 lb 6 oz) M Vag-Spont None N NOAM   3 SAB      SAB      2 SAB            1 SAB                /78   Wt 133.4 kg (294 lb 1.5 oz)   LMP 2023 (Approximate)   Breastfeeding No   BMI 43.43 kg/m²         ROS:  GENERAL: Denies weight gain or weight loss. Feeling well overall.   SKIN: Denies rash or lesions.   HEAD: Denies head  injury or headache.   NODES: Denies enlarged lymph nodes.   CHEST: Denies chest pain or shortness of breath.   CARDIOVASCULAR: Denies palpitations or left sided chest pain.   ABDOMEN: No abdominal pain, constipation, diarrhea, nausea, vomiting or rectal bleeding.   URINARY: No frequency, dysuria, hematuria, or burning on urination.  REPRODUCTIVE: See HPI.   BREASTS: The patient performs breast self-examination and denies pain, lumps, or nipple discharge.   HEMATOLOGIC: No easy bruisability or excessive bleeding.   MUSCULOSKELETAL: Denies joint pain or swelling.   NEUROLOGIC: Denies syncope or weakness.   PSYCHIATRIC: Denies depression, anxiety or mood swings.    PE:   APPEARANCE: Well nourished, well developed, in no acute distress.  AFFECT: WNL, alert and oriented x 3.  SKIN: No acne or hirsutism.  NECK: Neck symmetric without masses or thyromegaly.  NODES: No inguinal, cervical, axillary or femoral lymph node enlargement.  CHEST: Good respiratory effort.   ABDOMEN: Soft. No tenderness or masses. No hepatosplenomegaly. No hernias.  BREASTS: Symmetrical, no skin changes or visible lesions. No palpable masses, nipple discharge bilaterally.  PELVIC: Normal external female genitalia without lesions. Normal hair distribution. Adequate perineal body, normal urethral meatus. Vagina moist and well rugated without lesions or discharge. Cervix pink, without lesions, discharge or tenderness. No significant cystocele or rectocele. Bimanual exam shows uterus is difficult to determine secondary to habitus weeks, regular, mobile and nontender. Adnexa without masses or tenderness.  EXTREMITIES: No edema.          ASSESSMENT and PLAN:  38-year-old  9 para 5 with secondary amenorrhea and positive UPT.    Closely spaced pregnancies with menses unreliable but appears to be first-trimester.    Prenatal lab today.    GC chlamydia and Pap with Co test today.    History of elevated blood pressure, baseline PIH labs today.     Obesity in pregnancy-A1c today.    Continue with prenatal vitamins.    First-trimester information provided.    Miscarriage precautions.    Return to office 1 week for dating ultrasound new OB visit or p.r.n.     Information for review  -      Based on A.C.S. Pap guidelines, recommendation yearly pelvic exams, mammograms and monthly self breast exams; to see her PCP for other health maintenance and pregnancy.   -      Patient's medications and medical history reviewed with patient and implications in pregnancy.   -      Pregnancy course, visits every 4 weeks until 28 weeks, every 2 weeks until 36 weeks and weekly until delivery. Ultra sound for dates in 1st trimester, anatomy ultrasound at 20-22 weeks and growth ulrasound at 36 weeks. This could be changed according to other pregnancy developments.  .Proper weight gain based on the [employer]'s recommendations based on her pre-pregnancy weight.   Foods to avoid in pregnancy (i.e. sushi, fish that are high in mercury, deli meat, and unpasteurized cheeses).   Advise prenatal vitamin options (i.e. stool softener, DHA).   Discussed potential medical problems in pregnancy.  -     Oriented to practice including CNM collaboration.

## 2024-01-30 NOTE — PATIENT INSTRUCTIONS
Patient Education       Pregnancy - The Fourth Month   About this topic   It is important for you to learn how to take care of yourself to help you have a healthy baby and safe delivery. It is good to have health care throughout your pregnancy.  The fourth month of your pregnancy starts around week 14 and lasts through week 18. By knowing how far along you are, you can learn what is normal for this stage of your pregnancy and plan for what is next.  General   Growth and Development   During the fourth month of your pregnancy, here are some things you can expect.  You may:  Start to show that you are pregnant. It is normal to gain about 5 to 10 pounds (2.3 to 4.5 kg) total in your first 4 months.  Have heartburn  Feel like you have trouble paying attention to things  Have less nausea  Notice your breasts are growing and the veins are easier to see on them  Have swollen veins in your legs and feet, more nosebleeds, or bleeding when you brush your teeth. These are all because of the extra blood your body has while you are pregnant.  Notice more swelling in your hands and feet  Start to feel fluttering when you are lying or sitting quietly. This is your baby kicking.  Have pain in your sides with sudden movement. This is normal and happens because the ligaments in your belly are stretching.  Have a little more energy. Exercise is good for you, but check with your doctor before starting new exercises.  Most of the time it is safe for you to have sex while you are pregnant. It wont hurt the baby.  Your babys:  Skin is very thin and you can easily see blood vessels through it. Your baby is covered with lots of fine hair to protect their skin.  Bones are starting to harden. Your baby is able to frown, smile, stretch, and move.  Practicing breathing movements while inside of your womb  About 6 inches (16 cm) long and weighs about 7 ounces (200 gm). Your baby is about the size of an orange.  Things to Think About   Avoid  alcohol, drugs, tobacco products, and second hand smoke  Check with your doctor before taking any kind of drugs. Continue to take your vitamin with folic acid.  Avoid cleaning cat litter boxes. This can cause a disease that causes birth defects in your baby.  Amniocentesis and other prenatal screening tests may be done this month.  Try sleeping on your side. Use a pillow between your legs. Avoid sleeping on your back. This will help with the blood flow to your baby.  Change positions and get up slowly. Your heart has to work hard to cope with all of the extra blood volume.  Where will you take your baby for care after they are born? This is a good time to find a doctor for your baby.  Eat fresh fruits and foods with a lot of fiber to help with hard stools.  Drink at least 6 to 8 glasses of water each day.  When do I need to call the doctor?   Vaginal bleeding  Leaking of fluid from your vagina  Problems with constipation  Belly pain  Any illness or infection  Severe headaches or headaches that wont go away  Where can I learn more?   Better Health  https://www.betterhealth.[place].gov.[place]/health/HealthyLiving/pregnancy-stages-and-changes   Family Doctor  https://familydoctor.org/changes-in-your-body-during-pregnancy-first-trimester/   Last Reviewed Date   2020-04-20  Consumer Information Use and Disclaimer   This information is not specific medical advice and does not replace information you receive from your health care provider. This is only a brief summary of general information. It does NOT include all information about conditions, illnesses, injuries, tests, procedures, treatments, therapies, discharge instructions or life-style choices that may apply to you. You must talk with your health care provider for complete information about your health and treatment options. This information should not be used to decide whether or not to accept your health care providers advice, instructions or recommendations. Only your  health care provider has the knowledge and training to provide advice that is right for you.  Copyright   Copyright © 2021 [employer] and its affiliates and/or licensors. All rights reserved.

## 2024-01-31 LAB
RPR SER QL: NORMAL
RUBV IGG SER-ACNC: 10.4 IU/ML
RUBV IGG SER-IMP: REACTIVE

## 2024-02-01 LAB
C TRACH DNA SPEC QL NAA+PROBE: NOT DETECTED
N GONORRHOEA DNA SPEC QL NAA+PROBE: NOT DETECTED

## 2024-02-05 LAB
FINAL PATHOLOGIC DIAGNOSIS: NORMAL
HPV HR 12 DNA SPEC QL NAA+PROBE: POSITIVE
HPV16 AG SPEC QL: NEGATIVE
HPV18 DNA SPEC QL NAA+PROBE: NEGATIVE
Lab: NORMAL

## 2024-02-06 ENCOUNTER — PROCEDURE VISIT (OUTPATIENT)
Dept: OBSTETRICS AND GYNECOLOGY | Facility: CLINIC | Age: 39
End: 2024-02-06
Payer: MEDICAID

## 2024-02-06 ENCOUNTER — PATIENT MESSAGE (OUTPATIENT)
Dept: OTHER | Facility: OTHER | Age: 39
End: 2024-02-06
Payer: MEDICAID

## 2024-02-06 ENCOUNTER — INITIAL PRENATAL (OUTPATIENT)
Dept: OBSTETRICS AND GYNECOLOGY | Facility: CLINIC | Age: 39
End: 2024-02-06
Payer: MEDICAID

## 2024-02-06 VITALS
BODY MASS INDEX: 43.14 KG/M2 | DIASTOLIC BLOOD PRESSURE: 78 MMHG | SYSTOLIC BLOOD PRESSURE: 128 MMHG | WEIGHT: 292.13 LBS

## 2024-02-06 DIAGNOSIS — O09.529 ANTEPARTUM MULTIGRAVIDA OF ADVANCED MATERNAL AGE: ICD-10-CM

## 2024-02-06 DIAGNOSIS — O09.40 GRAND MULTIPARITY, WITH CURRENT PREGNANCY, ANTEPARTUM: Primary | ICD-10-CM

## 2024-02-06 DIAGNOSIS — O99.210 OBESITY AFFECTING PREGNANCY, ANTEPARTUM, UNSPECIFIED OBESITY TYPE: ICD-10-CM

## 2024-02-06 PROCEDURE — 76815 OB US LIMITED FETUS(S): CPT | Mod: PBBFAC,PN | Performed by: OBSTETRICS & GYNECOLOGY

## 2024-02-06 PROCEDURE — 99999 PR PBB SHADOW E&M-EST. PATIENT-LVL II: CPT | Mod: PBBFAC,,, | Performed by: ADVANCED PRACTICE MIDWIFE

## 2024-02-06 PROCEDURE — 99203 OFFICE O/P NEW LOW 30 MIN: CPT | Mod: TH,S$PBB,, | Performed by: ADVANCED PRACTICE MIDWIFE

## 2024-02-06 PROCEDURE — 99212 OFFICE O/P EST SF 10 MIN: CPT | Mod: PBBFAC,TH,PN | Performed by: ADVANCED PRACTICE MIDWIFE

## 2024-02-06 NOTE — PROGRESS NOTES
38 y.o. female  at 17w0d   Is feeling flutters, denies VB, LOF or cramping  New OB today-consent signed.    Dating ultrasound today-vertex, anterior placenta, three-vessel cord, normal fluid, EFW 7 oz, single IUP at 17 weeks 0 days gestational age yielding EDC 2024    Doing well but states that she tested positive for COVID on  2 days ago.  Wearing a mask.  Advised to rest for 5 days from  through Thursday.  COVID precautions    Advanced maternal age-maternity 21 to be performed [facility]  Paperwork is completed and provide to patient to bring with her.    Also advised daily baby aspirin    BMI more than 40-daily baby aspirin, weekly ultrasound at 34 weeks    History of labile blood pressure previous pregnancy therefore basic PIH labs were drawn-unremarkable with PCR 0.06  Reviewed prenatal labs-A1c 5.1  Pap was negative with positive HPV, Negative-16 and 18  Genetic testing maternity 21 on Friday  Anatomy scan ordered    Reviewed warning signs, pregnancy precautions and how/when to call.  RTC x 3 wks, call or present sooner prn.     I spent a total of 20 minutes on the day of the visit.This includes face to face time and non-face to face time preparing to see the patient (eg, review of tests), Obtaining and/or reviewing separately obtained history, Documenting clinical information in the electronic or other health record, Independently interpreting resultsand communicating results to the patient/family/caregiver, or Care coordination.              CC: Absence of menses risk assessment Kerrie Malik is a 38 y.o. female  presents with complaint of absence of menstruation.  She denies nausea/vomIting/abdominal pain/bleeding.  UPT is positive.    Menstrual History    LMP uncertain, EDC uncertain,   Past Medical History:   Diagnosis Date    Abnormal Pap smear 2005    colposcopy    Abnormal Pap smear of cervix         Asthma affecting  pregnancy, antepartum 3/12/2018    Miscarriage      Past Surgical History:   Procedure Laterality Date    Right Knee       Social History     Socioeconomic History    Marital status:    Tobacco Use    Smoking status: Never     Passive exposure: Never    Smokeless tobacco: Never   Substance and Sexual Activity    Alcohol use: No    Drug use: No    Sexual activity: Yes     Partners: Male     Birth control/protection: None     Family History   Problem Relation Age of Onset    Breast cancer Neg Hx     Colon cancer Neg Hx     Ovarian cancer Neg Hx     Stroke Neg Hx     Thrombosis Neg Hx      OB History    Para Term  AB Living   10 5 5 0 4 5   SAB IAB Ectopic Multiple Live Births   4 0 0 0 5      # Outcome Date GA Lbr Drake/2nd Weight Sex Delivery Anes PTL Lv   10 Current            9 Term 23 39w1d / 00:05 3.19 kg (7 lb 0.5 oz) F Vag-Spont EPI N NOAM   8 SAB 05/10/20           7 Term 18 39w6d / 00:05 3.47 kg (7 lb 10.4 oz) F Vag-Spont EPI N NOAM   6 Term 02/10/15 40w0d  3.912 kg (8 lb 10 oz) F Vag-Spont EPI N NOAM   5 Term 03 40w0d 01:00 3.969 kg (8 lb 12 oz) F Vag-Spont None N NOAM   4 Term 99 40w0d 12:00 4.252 kg (9 lb 6 oz) M Vag-Spont None N NOAM   3 SAB      SAB      2 SAB            1 SAB                /78   Wt 132.5 kg (292 lb 1.8 oz)   LMP 2023 (Approximate)   BMI 43.14 kg/m²         ROS:  GENERAL: Denies weight gain or weight loss. Feeling well overall.   SKIN: Denies rash or lesions.   HEAD: Denies head injury or headache.   NODES: Denies enlarged lymph nodes.   CHEST: Denies chest pain or shortness of breath.   CARDIOVASCULAR: Denies palpitations or left sided chest pain.   ABDOMEN: No abdominal pain, constipation, diarrhea, nausea, vomiting or rectal bleeding.   URINARY: No frequency, dysuria, hematuria, or burning on urination.  REPRODUCTIVE: See HPI.   BREASTS: The patient performs breast self-examination and denies pain, lumps, or nipple discharge.    HEMATOLOGIC: No easy bruisability or excessive bleeding.   MUSCULOSKELETAL: Denies joint pain or swelling.   NEUROLOGIC: Denies syncope or weakness.   PSYCHIATRIC: Denies depression, anxiety or mood swings.    PE:   APPEARANCE: Well nourished, well developed, in no acute distress.  AFFECT: WNL, alert and oriented x 3.  SKIN: No acne or hirsutism.  NECK: Neck symmetric without masses or thyromegaly.  NODES: No inguinal, cervical, axillary or femoral lymph node enlargement.  CHEST: Good respiratory effort.   ABDOMEN: Soft. No tenderness or masses. No hepatosplenomegaly. No hernias.  BREASTS: Symmetrical, no skin changes or visible lesions. No palpable masses, nipple discharge bilaterally.  PELVIC: Normal external female genitalia without lesions. Normal hair distribution. Adequate perineal body, normal urethral meatus. Vagina moist and well rugated without lesions or discharge. Cervix pink, without lesions, discharge or tenderness. No significant cystocele or rectocele. Bimanual exam shows uterus is difficult to determine secondary to habitus weeks, regular, mobile and nontender. Adnexa without masses or tenderness.  EXTREMITIES: No edema.          ASSESSMENT and PLAN:  38-year-old  9 para 5 with secondary amenorrhea and positive UPT.    Closely spaced pregnancies with menses unreliable but appears to be first-trimester.    Prenatal lab today.    GC chlamydia and Pap with Co test today.    History of elevated blood pressure, baseline PIH labs today.    Obesity in pregnancy-A1c today.    Continue with prenatal vitamins.    First-trimester information provided.    Miscarriage precautions.    Return to office 1 week for dating ultrasound new OB visit or p.r.n.     Information for review  -      Based on A.C.S. Pap guidelines, recommendation yearly pelvic exams, mammograms and monthly self breast exams; to see her PCP for other health maintenance and pregnancy.   -      Patient's medications and medical history  reviewed with patient and implications in pregnancy.   -      Pregnancy course, visits every 4 weeks until 28 weeks, every 2 weeks until 36 weeks and weekly until delivery. Ultra sound for dates in 1st trimester, anatomy ultrasound at 20-22 weeks and growth ulrasound at 36 weeks. This could be changed according to other pregnancy developments.  .Proper weight gain based on the [place] [employer]'s recommendations based on her pre-pregnancy weight.   Foods to avoid in pregnancy (i.e. sushi, fish that are high in mercury, deli meat, and unpasteurized cheeses).   Advise prenatal vitamin options (i.e. stool softener, DHA).   Discussed potential medical problems in pregnancy.  -     Oriented to practice including CNM collaboration.

## 2024-02-06 NOTE — PATIENT INSTRUCTIONS
Patient Education       Pregnancy - The Fourth Month   About this topic   It is important for you to learn how to take care of yourself to help you have a healthy baby and safe delivery. It is good to have health care throughout your pregnancy.  The fourth month of your pregnancy starts around week 14 and lasts through week 18. By knowing how far along you are, you can learn what is normal for this stage of your pregnancy and plan for what is next.  General   Growth and Development   During the fourth month of your pregnancy, here are some things you can expect.  You may:  Start to show that you are pregnant. It is normal to gain about 5 to 10 pounds (2.3 to 4.5 kg) total in your first 4 months.  Have heartburn  Feel like you have trouble paying attention to things  Have less nausea  Notice your breasts are growing and the veins are easier to see on them  Have swollen veins in your legs and feet, more nosebleeds, or bleeding when you brush your teeth. These are all because of the extra blood your body has while you are pregnant.  Notice more swelling in your hands and feet  Start to feel fluttering when you are lying or sitting quietly. This is your baby kicking.  Have pain in your sides with sudden movement. This is normal and happens because the ligaments in your belly are stretching.  Have a little more energy. Exercise is good for you, but check with your doctor before starting new exercises.  Most of the time it is safe for you to have sex while you are pregnant. It wont hurt the baby.  Your babys:  Skin is very thin and you can easily see blood vessels through it. Your baby is covered with lots of fine hair to protect their skin.  Bones are starting to harden. Your baby is able to frown, smile, stretch, and move.  Practicing breathing movements while inside of your womb  About 6 inches (16 cm) long and weighs about 7 ounces (200 gm). Your baby is about the size of an orange.  Things to Think About   Avoid  alcohol, drugs, tobacco products, and second hand smoke  Check with your doctor before taking any kind of drugs. Continue to take your vitamin with folic acid.  Avoid cleaning cat litter boxes. This can cause a disease that causes birth defects in your baby.  Amniocentesis and other prenatal screening tests may be done this month.  Try sleeping on your side. Use a pillow between your legs. Avoid sleeping on your back. This will help with the blood flow to your baby.  Change positions and get up slowly. Your heart has to work hard to cope with all of the extra blood volume.  Where will you take your baby for care after they are born? This is a good time to find a doctor for your baby.  Eat fresh fruits and foods with a lot of fiber to help with hard stools.  Drink at least 6 to 8 glasses of water each day.  When do I need to call the doctor?   Vaginal bleeding  Leaking of fluid from your vagina  Problems with constipation  Belly pain  Any illness or infection  Severe headaches or headaches that wont go away  Where can I learn more?   Better Health  https://www.betterhealth.[place].gov.[place]/health/HealthyLiving/pregnancy-stages-and-changes   Family Doctor  https://familydoctor.org/changes-in-your-body-during-pregnancy-first-trimester/   Last Reviewed Date   2020-04-20  Consumer Information Use and Disclaimer   This information is not specific medical advice and does not replace information you receive from your health care provider. This is only a brief summary of general information. It does NOT include all information about conditions, illnesses, injuries, tests, procedures, treatments, therapies, discharge instructions or life-style choices that may apply to you. You must talk with your health care provider for complete information about your health and treatment options. This information should not be used to decide whether or not to accept your health care providers advice, instructions or recommendations. Only your  health care provider has the knowledge and training to provide advice that is right for you.  Copyright   Copyright © 2021 [employer] and its affiliates and/or licensors. All rights reserved.

## 2024-02-07 ENCOUNTER — PATIENT MESSAGE (OUTPATIENT)
Dept: ADMINISTRATIVE | Facility: OTHER | Age: 39
End: 2024-02-07
Payer: MEDICAID

## 2024-02-09 ENCOUNTER — LAB VISIT (OUTPATIENT)
Dept: LAB | Facility: HOSPITAL | Age: 39
End: 2024-02-09
Attending: ADVANCED PRACTICE MIDWIFE
Payer: MEDICAID

## 2024-02-09 DIAGNOSIS — O09.40 GRAND MULTIPARITY, WITH CURRENT PREGNANCY, ANTEPARTUM: ICD-10-CM

## 2024-02-09 PROCEDURE — 36415 COLL VENOUS BLD VENIPUNCTURE: CPT | Performed by: ADVANCED PRACTICE MIDWIFE

## 2024-02-27 ENCOUNTER — ROUTINE PRENATAL (OUTPATIENT)
Dept: OBSTETRICS AND GYNECOLOGY | Facility: CLINIC | Age: 39
End: 2024-02-27
Payer: MEDICAID

## 2024-02-27 ENCOUNTER — PATIENT MESSAGE (OUTPATIENT)
Dept: OTHER | Facility: OTHER | Age: 39
End: 2024-02-27
Payer: MEDICAID

## 2024-02-27 ENCOUNTER — PROCEDURE VISIT (OUTPATIENT)
Dept: OBSTETRICS AND GYNECOLOGY | Facility: CLINIC | Age: 39
End: 2024-02-27
Payer: MEDICAID

## 2024-02-27 VITALS
SYSTOLIC BLOOD PRESSURE: 110 MMHG | DIASTOLIC BLOOD PRESSURE: 78 MMHG | BODY MASS INDEX: 42.84 KG/M2 | WEIGHT: 290.13 LBS

## 2024-02-27 DIAGNOSIS — Z36.2 ENCOUNTER FOR FOLLOW-UP ULTRASOUND OF FETAL ANATOMY: Primary | ICD-10-CM

## 2024-02-27 DIAGNOSIS — O09.40 GRAND MULTIPARITY, WITH CURRENT PREGNANCY, ANTEPARTUM: ICD-10-CM

## 2024-02-27 PROCEDURE — 76811 OB US DETAILED SNGL FETUS: CPT | Mod: PBBFAC,PN | Performed by: OBSTETRICS & GYNECOLOGY

## 2024-02-27 PROCEDURE — 99212 OFFICE O/P EST SF 10 MIN: CPT | Mod: PBBFAC,25,TH,PN | Performed by: ADVANCED PRACTICE MIDWIFE

## 2024-02-27 PROCEDURE — 99999 PR PBB SHADOW E&M-EST. PATIENT-LVL II: CPT | Mod: PBBFAC,,, | Performed by: ADVANCED PRACTICE MIDWIFE

## 2024-02-27 PROCEDURE — 99213 OFFICE O/P EST LOW 20 MIN: CPT | Mod: TH,S$PBB,, | Performed by: ADVANCED PRACTICE MIDWIFE

## 2024-02-27 NOTE — PROGRESS NOTES
38 y.o. female  at 20w0d   Is feeling flutters/FM, denies VB, LOF or cramping  Doing well without concerns     Advanced maternal age-daily baby aspirin and weekly ultrasound at 32 weeks    BMI-daily baby aspirin    TWG: -2 lbs     Reviewed anatomy US-vertex, anterior placenta, three-vessel cord, normal fluid, normal male anatomy with suboptimal heart thorax abdomen and spine with no obvious abnormalities-reassured will follow-up anatomy scan in 4 weeks pending [facility] recommendation    Genetic testing maternity 21-negative boy     Reviewed warning signs, normal FM,  labor precautions and how/when to call.  RTC x 4 wks, call or present sooner prn.     I spent a total of 20 minutes on the day of the visit.This includes face to face time and non-face to face time preparing to see the patient (eg, review of tests), Obtaining and/or reviewing separately obtained history, Documenting clinical information in the electronic or other health record, Independently interpreting resultsand communicating results to the patient/family/caregiver, or Care coordination.

## 2024-03-26 ENCOUNTER — PROCEDURE VISIT (OUTPATIENT)
Dept: OBSTETRICS AND GYNECOLOGY | Facility: CLINIC | Age: 39
End: 2024-03-26
Payer: MEDICAID

## 2024-03-26 ENCOUNTER — ROUTINE PRENATAL (OUTPATIENT)
Dept: OBSTETRICS AND GYNECOLOGY | Facility: CLINIC | Age: 39
End: 2024-03-26
Payer: MEDICAID

## 2024-03-26 ENCOUNTER — PATIENT MESSAGE (OUTPATIENT)
Dept: OTHER | Facility: OTHER | Age: 39
End: 2024-03-26
Payer: MEDICAID

## 2024-03-26 VITALS
BODY MASS INDEX: 42.49 KG/M2 | SYSTOLIC BLOOD PRESSURE: 109 MMHG | WEIGHT: 287.69 LBS | DIASTOLIC BLOOD PRESSURE: 76 MMHG

## 2024-03-26 DIAGNOSIS — Z36.2 ENCOUNTER FOR FOLLOW-UP ULTRASOUND OF FETAL ANATOMY: ICD-10-CM

## 2024-03-26 DIAGNOSIS — O99.210 OBESITY AFFECTING PREGNANCY, ANTEPARTUM, UNSPECIFIED OBESITY TYPE: ICD-10-CM

## 2024-03-26 DIAGNOSIS — O09.529 ANTEPARTUM MULTIGRAVIDA OF ADVANCED MATERNAL AGE: Primary | ICD-10-CM

## 2024-03-26 DIAGNOSIS — O09.40 GRAND MULTIPARITY, WITH CURRENT PREGNANCY, ANTEPARTUM: ICD-10-CM

## 2024-03-26 PROCEDURE — 99212 OFFICE O/P EST SF 10 MIN: CPT | Mod: PBBFAC,TH,PN,25 | Performed by: ADVANCED PRACTICE MIDWIFE

## 2024-03-26 PROCEDURE — 76816 OB US FOLLOW-UP PER FETUS: CPT | Mod: PBBFAC,PN | Performed by: OBSTETRICS & GYNECOLOGY

## 2024-03-26 PROCEDURE — 99999 PR PBB SHADOW E&M-EST. PATIENT-LVL II: CPT | Mod: PBBFAC,,, | Performed by: ADVANCED PRACTICE MIDWIFE

## 2024-03-26 PROCEDURE — 99213 OFFICE O/P EST LOW 20 MIN: CPT | Mod: TH,S$PBB,, | Performed by: ADVANCED PRACTICE MIDWIFE

## 2024-03-26 NOTE — PATIENT INSTRUCTIONS
Patient Education       Pregnancy - The Fifth Month   About this topic   It is important for you to learn how to take care of yourself to help you have a healthy baby and safe delivery. It is good to have health care throughout your pregnancy.  The fifth month of your pregnancy starts around week 19 and lasts through week 23. By knowing how far along you are, you can learn what is normal for this stage of your pregnancy and plan for what is next.  General   Growth and Development   During the fifth month of your pregnancy, here are some things you can expect.  You may:  Start to gain a little more weight. It is normal to gain about 10 to 15 pounds (4.5 to 7 kg) total in your first 5 months.  Have leg cramps. Be sure to drink plenty of water.  Have loose teeth.  Have more trouble breathing or with heartburn as your baby gets bigger  Start having [place] Wang contractions. You may feel your belly squeezing or getting tight. Be sure to drink 6 to 8 glasses of water each day.  Notice you are able to express milk from your breasts  See stretch marks on your belly, breasts, or legs. Stay active to try and keep good muscle tone.  Be checked for gestational diabetes  Your baby's growth and development:  Your baby is covered with a thick whitish substance called vernix. This helps protect your babys skin.  Their genitals are able to be seen on an ultrasound. Your doctor will check your babys size, how much fluid there is around your baby, and all of your babys organs with the ultrasound.  Your baby is starting to be able to see, hear, taste, and feel touch.  The bones are starting to make blood cells.  Your baby is about 11 inches (28 cm) long and weighs about 1 pound (450 gm). Your baby is about the size of a grapefruit.  Things to Think About   Avoid alcohol, drugs, tobacco products, and second hand smoke  Do not clean your cat litter box. You could get a disease that causes birth defects to your baby.  Check with your  doctor before taking any kind of drugs. Continue to take your vitamin with folic acid.  Do you plan to breastfeed your baby?  Where will you deliver? Do you plan to take prenatal classes? If so, try to have them completed by your 8th month.  Start to think about your plans for pain control during labor.  You may want to learn about cord blood banking.  Rest and take breaks each day.  When do I need to call the doctor?   Contractions every 10 minutes or more often that do not go away with drinking water or position changes  Low, dull back pain that does not go away  Pressure in your pelvis that feels like your baby is pushing down  A gush or constant trickle of watery or bloody fluid leaking from your vagina  Cramps in your lower belly that come and go or are constant  Little to no movement felt by baby in 2 hours. Your baby should move at least 10 times every 2 hours.  Headache that does not go away, blurry vision, seeing spots or halos, increase in swelling in your hands, feet, or face, and pain under your ribs on the right side  Fever of 100.4°F (38°C) or higher  Bleeding or swelling of your gums  Urinating less, or having pain when you urinate  Vaginal bleeding with or without pain  After a car accident, fall, or any trauma to your belly  Having thoughts of harming yourself or others, or do not feel safe at home  Where can I learn more?   American Academy of Family Physicians  https://familydoctor.org/changes-in-your-body-during-pregnancy-second-trimester/   Better Health  https://www.betterhealth.[place].gov.[place]/health/HealthyLiving/pregnancy-stages-and-changes   Last Reviewed Date   2020-04-20  Consumer Information Use and Disclaimer   This information is not specific medical advice and does not replace information you receive from your health care provider. This is only a brief summary of general information. It does NOT include all information about conditions, illnesses, injuries, tests, procedures, treatments,  therapies, discharge instructions or life-style choices that may apply to you. You must talk with your health care provider for complete information about your health and treatment options. This information should not be used to decide whether or not to accept your health care providers advice, instructions or recommendations. Only your health care provider has the knowledge and training to provide advice that is right for you.  Copyright   Copyright © 2021 [employer] and its affiliates and/or licensors. All rights reserved.

## 2024-03-26 NOTE — PROGRESS NOTES
39 y.o. female  at 24w0d   Reports + FM, denies VB, LOF, or cramping  Doing well without concerns   TWG: -5 lbs       Reviewed upcoming 28wk labs, (A POS) and orders placed,     Antepartum multigravida of advanced maternal age  -     CBC Auto Differential; Future; Expected date: 2024  -     HIV 1/2 Ag/Ab (4th Gen); Future; Expected date: 2024  -     OB Glucose Screen; Future; Expected date: 2024  -     RPR; Future; Expected date: 2024  CONTINUE WITH DAILY BABY ASPIRIN  FOLLOW-UP VIEW ULTRASOUND TODAY-TRANSVERSE LIE WITH MVP 5.4 CM, EFW 1 LB 8 OZ AT 49 PERCENTILE, ANATOMY APPEARS TO BEAN VISUALIZED AND IS COMPLETE.  REASSURING PENDING MFM RECOMMENDATION    Grand multiparity, with current pregnancy, antepartum  CONTINUE WITH REGULAR PRENATAL    Obesity affecting pregnancy, antepartum, unspecified obesity type  cONTINUE WITH BABY ASA    Reviewed warning signs, normal FM,  labor precautions and how/when to call. Patient states understanding.  RTC x 3 wks, call or present sooner prn.

## 2024-04-08 ENCOUNTER — PATIENT MESSAGE (OUTPATIENT)
Dept: OBSTETRICS AND GYNECOLOGY | Facility: CLINIC | Age: 39
End: 2024-04-08
Payer: MEDICAID

## 2024-04-09 ENCOUNTER — ROUTINE PRENATAL (OUTPATIENT)
Dept: OBSTETRICS AND GYNECOLOGY | Facility: CLINIC | Age: 39
End: 2024-04-09
Payer: MEDICAID

## 2024-04-09 ENCOUNTER — LAB VISIT (OUTPATIENT)
Dept: LAB | Facility: HOSPITAL | Age: 39
End: 2024-04-09
Attending: ADVANCED PRACTICE MIDWIFE
Payer: MEDICAID

## 2024-04-09 ENCOUNTER — PATIENT MESSAGE (OUTPATIENT)
Dept: OTHER | Facility: OTHER | Age: 39
End: 2024-04-09
Payer: MEDICAID

## 2024-04-09 VITALS — BODY MASS INDEX: 43.32 KG/M2 | SYSTOLIC BLOOD PRESSURE: 118 MMHG | WEIGHT: 293 LBS | DIASTOLIC BLOOD PRESSURE: 72 MMHG

## 2024-04-09 DIAGNOSIS — O09.40 GRAND MULTIPARITY, WITH CURRENT PREGNANCY, ANTEPARTUM: ICD-10-CM

## 2024-04-09 DIAGNOSIS — O09.529 ANTEPARTUM MULTIGRAVIDA OF ADVANCED MATERNAL AGE: ICD-10-CM

## 2024-04-09 DIAGNOSIS — O99.210 OBESITY AFFECTING PREGNANCY, ANTEPARTUM, UNSPECIFIED OBESITY TYPE: ICD-10-CM

## 2024-04-09 DIAGNOSIS — O09.529 ANTEPARTUM MULTIGRAVIDA OF ADVANCED MATERNAL AGE: Primary | ICD-10-CM

## 2024-04-09 LAB
BASOPHILS # BLD AUTO: 0.02 K/UL (ref 0–0.2)
BASOPHILS NFR BLD: 0.5 % (ref 0–1.9)
DIFFERENTIAL METHOD BLD: ABNORMAL
EOSINOPHIL # BLD AUTO: 0.1 K/UL (ref 0–0.5)
EOSINOPHIL NFR BLD: 1.5 % (ref 0–8)
ERYTHROCYTE [DISTWIDTH] IN BLOOD BY AUTOMATED COUNT: 13.5 % (ref 11.5–14.5)
GLUCOSE SERPL-MCNC: 85 MG/DL (ref 70–140)
HCT VFR BLD AUTO: 38.1 % (ref 37–48.5)
HGB BLD-MCNC: 12.7 G/DL (ref 12–16)
HIV 1+2 AB+HIV1 P24 AG SERPL QL IA: NORMAL
IMM GRANULOCYTES # BLD AUTO: 0.01 K/UL (ref 0–0.04)
IMM GRANULOCYTES NFR BLD AUTO: 0.2 % (ref 0–0.5)
LYMPHOCYTES # BLD AUTO: 1.4 K/UL (ref 1–4.8)
LYMPHOCYTES NFR BLD: 34.3 % (ref 18–48)
MCH RBC QN AUTO: 33.2 PG (ref 27–31)
MCHC RBC AUTO-ENTMCNC: 33.3 G/DL (ref 32–36)
MCV RBC AUTO: 100 FL (ref 82–98)
MONOCYTES # BLD AUTO: 0.3 K/UL (ref 0.3–1)
MONOCYTES NFR BLD: 7.6 % (ref 4–15)
NEUTROPHILS # BLD AUTO: 2.3 K/UL (ref 1.8–7.7)
NEUTROPHILS NFR BLD: 55.9 % (ref 38–73)
NRBC BLD-RTO: 0 /100 WBC
PLATELET # BLD AUTO: 304 K/UL (ref 150–450)
PMV BLD AUTO: 10.9 FL (ref 9.2–12.9)
RBC # BLD AUTO: 3.82 M/UL (ref 4–5.4)
WBC # BLD AUTO: 4.08 K/UL (ref 3.9–12.7)

## 2024-04-09 PROCEDURE — 36415 COLL VENOUS BLD VENIPUNCTURE: CPT | Mod: PN | Performed by: ADVANCED PRACTICE MIDWIFE

## 2024-04-09 PROCEDURE — 99212 OFFICE O/P EST SF 10 MIN: CPT | Mod: PBBFAC,TH,PN | Performed by: ADVANCED PRACTICE MIDWIFE

## 2024-04-09 PROCEDURE — 86592 SYPHILIS TEST NON-TREP QUAL: CPT | Performed by: ADVANCED PRACTICE MIDWIFE

## 2024-04-09 PROCEDURE — 99999 PR PBB SHADOW E&M-EST. PATIENT-LVL II: CPT | Mod: PBBFAC,,, | Performed by: ADVANCED PRACTICE MIDWIFE

## 2024-04-09 PROCEDURE — 87389 HIV-1 AG W/HIV-1&-2 AB AG IA: CPT | Performed by: ADVANCED PRACTICE MIDWIFE

## 2024-04-09 PROCEDURE — 99213 OFFICE O/P EST LOW 20 MIN: CPT | Mod: TH,S$PBB,, | Performed by: ADVANCED PRACTICE MIDWIFE

## 2024-04-09 PROCEDURE — 85025 COMPLETE CBC W/AUTO DIFF WBC: CPT | Performed by: ADVANCED PRACTICE MIDWIFE

## 2024-04-09 PROCEDURE — 82950 GLUCOSE TEST: CPT | Performed by: ADVANCED PRACTICE MIDWIFE

## 2024-04-09 NOTE — PATIENT INSTRUCTIONS
Patient Education       Pregnancy - The Sixth Month   About this topic   It is important for you to learn how to take care of yourself to help you have a healthy baby and safe delivery. It is good to have health care throughout your pregnancy.  The sixth month of your pregnancy starts around week 24 and lasts through week 28. By knowing how far along you are, you can learn what is normal for this stage of your pregnancy and plan for what is next.  General   Your body   During the sixth month of your pregnancy, here are some things you can expect.  You may:  Start to gain a little more weight. It is normal to gain about 10 to 15 pounds (4.5 to 7 kg) total in your first 6 months.  Have problems like back pain, dry eyes, or aching hands and wrists  Itching of palms, abdomen, or soles of your feet  Swelling of ankles, fingers, or face.  Need to urinate more frequently.  Have problems with hemorrhoids. Be sure to eat plenty of fresh fruits and vegetables to increase the fiber in your diet. Drink plenty of water to help keep your stools soft.  Continue having [place] Wang contractions. You may feel your belly squeezing or getting tight.  Have a glucose test to test for gestational diabetes.  Have more headaches. Talk to your doctor about how to help with them.  See stretch marks on your belly, breasts, or legs. Stay active to try and keep good muscle tone.  See an increase in vaginal discharge. Talk to your doctor about what to expect.  Your baby's growth and development:  Your baby looks like a very small  baby.  They are able to live outside of your womb but would need a lot of help breathing, eating, and staying warm in an intensive care unit.  Your baby has times of being awake and times of being asleep. The babys eyelids are able to open and close.  They move more and have hiccups.  Your baby is about 14 inches (36 cm) long and weighs about 2 pounds (900 gm). Your baby is about the size of an eggplant.  Baby  may be able to hear voices.  Things to Think About   Avoid alcohol, drugs, tobacco products, and second hand smoke  Eat small meals throughout the day instead of larger meals.  Avoid spicy, greasy, or fatty foods.  Avoid lying down or bending over for around 3 hours after eating  Warm baths are fine to help you relax. Avoid hot tubs while you are pregnant.  Avoid straining when having bowel movements.  Learning how to care for your baby. You may want to sign up for classes on how to take care of a .  Are you planning on going back to work after having your baby? Its not too early to think about .  Consider starting your birth plan if you have specific needs or wishes for delivery.  When do I need to call the doctor?   Contractions every 10 minutes or more often that do not go away with drinking water or position changes  Low, dull back pain that does not go away  Pressure in your pelvis that feels like your baby is pushing down  A gush or constant trickle of watery or bloody fluid leaking from your vagina  Cramps in your lower belly that come and go or are constant  Change in your baby's movement  Fever of 100.4°F (38°C) or higher  Little to no movement felt by baby in 2 hours. Your baby should be moving at least 10 times every 2 hours.  Headache that does not go away; blurry vision; seeing spots or halos; increase in swelling in your hands, feet, or face; and pain under your ribs on the right side  Vaginal bleeding with or without pain  After a car accident, fall, or any trauma to your belly  Having thoughts of harming yourself or others, or do not feel safe at home  Where can I learn more?   American Academy of Family Physicians  https://familydoctor.org/changes-in-your-body-during-pregnancy-second-trimester/   KidsHealth  http://kidshealth.org/en/parents/pregnancy-calendar-intro.html?WT.ac=p-barbie   Office of Womens  Health  https://www.womenshealth.gov/pregnancy/youre-pregnant-now-what/stages-pregnancy   Last Reviewed Date   2020-05-06  Consumer Information Use and Disclaimer   This information is not specific medical advice and does not replace information you receive from your health care provider. This is only a brief summary of general information. It does NOT include all information about conditions, illnesses, injuries, tests, procedures, treatments, therapies, discharge instructions or life-style choices that may apply to you. You must talk with your health care provider for complete information about your health and treatment options. This information should not be used to decide whether or not to accept your health care providers advice, instructions or recommendations. Only your health care provider has the knowledge and training to provide advice that is right for you.  Copyright   Copyright © 2021 [employer] and its affiliates and/or licensors. All rights reserved.  Patient Education       Fetal Movement   About this topic   Feeling your baby move for the first time is a good sign that your baby is doing well. You may begin to feel these movements between the 18th and 25th weeks of your pregnancy. If this is your first time being pregnant, it may be closer to 25 weeks. Your baby has been moving around before this, but the kicks have not been strong enough for you to feel. During the first weeks of feeling movement, you may start to see a pattern during the day when your baby is most active. You can track your baby's kicks each day at home. This is also known as kick counting. It is a good way to check on your baby's movements and well being.  Most often, fetal kick counting is used in high-risk pregnancies. It may be useful for all pregnancies. Counting and writing down your baby's kicks, jabs, twists, flutters, rolls, turns, flips, and swishes may help find a problem that needs more evaluation. The American  "College of Obstetricians and Gynecologists, or ACOG, suggests that you record how much time it takes you to feel 10 of these movements. Ideally, you should be able to feel 10 movements within 2 hours. Many people will track these movements in much less time.  General   How to Track Your Baby's Kick Counts   Most often your doctor will want you to wait until the 28th to 30th weeks of your pregnancy to start kick counting. Here are some tips to help you get started.  Find the time of day when your baby is most active. For some people, this is right after eating. Others find their baby moving a lot after they have been exercising or more active. Some babies are more active in the evenings when your blood sugar starts to lower.  Try to count kicks at about the same time each day.  Before you start counting, have something to eat or drink. Also take a short walk or do some light activity.  Choose a quiet place where you can focus on your baby's movements. Also get in a comfortable position. Try and lie on one side or the other. You may need to change positions until you find one that works best for you and your baby.  Keep a notebook to track your baby's kicks. Your doctor may give you a chart to use or you can make your own. Write down the date, time you started counting, and the time of each "kick" during a 2-hour period until you have felt 10 kicks.  Once you have recorded 10 kicks within 2 hours you can stop counting.  If you are not able to record 10 movements over 2 hours you should get up and move around or eat something and try again.  If you are not able to record 10 movements over 2 hours the second time, call your doctor. They may want you to go to the hospital to get your baby checked.  When do I need to call the doctor?   You have felt less than 10 movements over a period of 2 hours.  It takes longer each day to record 10 movements.  There is a big change in the pattern of movements you are writing " down.  You feel no movement for 2 hours even after eating a snack, light activity, and position changes.  Teach Back: Helping You Understand   The Teach Back Method helps you understand the information we are giving you. After you talk with the staff, tell them in your own words what you learned. This helps to make sure the staff has described each thing clearly. It also helps to explain things that may have been confusing. Before going home, make sure you can do these:  I can tell you about feeling my baby move.  I can tell you how I will track my babys kicks.  I can tell you what I will do if I feel less than 10 movements in 2 hours, it takes longer to feel my baby move 10 times, or there is a big change in how my baby is moving.  Last Reviewed Date   2021-10-08  Consumer Information Use and Disclaimer   This information is not specific medical advice and does not replace information you receive from your health care provider. This is only a brief summary of general information. It does NOT include all information about conditions, illnesses, injuries, tests, procedures, treatments, therapies, discharge instructions or life-style choices that may apply to you. You must talk with your health care provider for complete information about your health and treatment options. This information should not be used to decide whether or not to accept your health care providers advice, instructions or recommendations. Only your health care provider has the knowledge and training to provide advice that is right for you.  Copyright   Copyright © 2021 [employer] and its affiliates and/or licensors. All rights reserved.

## 2024-04-09 NOTE — PROGRESS NOTES
39 y.o. female  at 26w0d   Reports + FM, denies VB, LOF or CTX  Doing well without concerns   TW lbs gained 6 lb since last visit  28wk labs today (A POS)    Tdap consider at next      Antepartum multigravida of advanced maternal age  Continue with daily baby aspirin    Grand multiparity, with current pregnancy, antepartum  Continue with close observation and prenatal care    Obesity affecting pregnancy, antepartum, unspecified obesity type  Daily baby aspirin and doing well with weight  Other orders  -     PNV,calcium 72-iron-folic acid (PRENATAL VITAMIN PLUS LOW IRON) 27 mg iron- 1 mg Tab; Take 1 tablet (1 each total) by mouth once daily.  Dispense: 30 tablet; Refill: 11    Reviewed warning signs, normal FKCs,  labor precautions and how/when to call. Patient states understanding  RTC x 3 wks, call or present sooner prn.

## 2024-04-10 LAB — RPR SER QL: NORMAL

## 2024-04-23 ENCOUNTER — PATIENT MESSAGE (OUTPATIENT)
Dept: OTHER | Facility: OTHER | Age: 39
End: 2024-04-23
Payer: MEDICAID

## 2024-04-30 ENCOUNTER — ROUTINE PRENATAL (OUTPATIENT)
Dept: OBSTETRICS AND GYNECOLOGY | Facility: CLINIC | Age: 39
End: 2024-04-30
Payer: MEDICAID

## 2024-04-30 VITALS — BODY MASS INDEX: 43.77 KG/M2 | WEIGHT: 293 LBS | SYSTOLIC BLOOD PRESSURE: 126 MMHG | DIASTOLIC BLOOD PRESSURE: 72 MMHG

## 2024-04-30 DIAGNOSIS — O99.210 OBESITY AFFECTING PREGNANCY, ANTEPARTUM, UNSPECIFIED OBESITY TYPE: Primary | ICD-10-CM

## 2024-04-30 DIAGNOSIS — O09.529 ANTEPARTUM MULTIGRAVIDA OF ADVANCED MATERNAL AGE: ICD-10-CM

## 2024-04-30 PROCEDURE — 99999 PR PBB SHADOW E&M-EST. PATIENT-LVL II: CPT | Mod: PBBFAC,,, | Performed by: ADVANCED PRACTICE MIDWIFE

## 2024-04-30 PROCEDURE — 99212 OFFICE O/P EST SF 10 MIN: CPT | Mod: PBBFAC,TH,PN | Performed by: ADVANCED PRACTICE MIDWIFE

## 2024-04-30 PROCEDURE — 99213 OFFICE O/P EST LOW 20 MIN: CPT | Mod: TH,S$PBB,, | Performed by: ADVANCED PRACTICE MIDWIFE

## 2024-04-30 NOTE — PROGRESS NOTES
39 y.o. female  at 29w0d   Reports + FM, denies VB, LOF or CTX  Doing well without concerns   TW lbs   Reviewed 28wk lab results (A POS) H/H:  12.7/38.1, RPR negative, HIV negative   Glucose screen Normal 85  Tdap will defer to next visit      Obesity affecting pregnancy, antepartum, unspecified obesity type  -     US OB/GYN Procedure ([facility])-Future; Standing  Daily baby aspirin.    Minimal weight gain, doing well    Antepartum multigravida of advanced maternal age  -     US OB/GYN Procedure ([facility])-Future; Standing  More than 40 years old with staff weekly ultrasounds at 32 weeks      Reviewed warning signs, normal FKCs,  labor precautions and how/when to call. Patient states understanding  RTC x 3 wks, call or present sooner prn

## 2024-05-07 ENCOUNTER — PATIENT MESSAGE (OUTPATIENT)
Dept: OTHER | Facility: OTHER | Age: 39
End: 2024-05-07
Payer: MEDICAID

## 2024-05-21 ENCOUNTER — PATIENT MESSAGE (OUTPATIENT)
Dept: OTHER | Facility: OTHER | Age: 39
End: 2024-05-21
Payer: MEDICAID

## 2024-05-21 ENCOUNTER — ROUTINE PRENATAL (OUTPATIENT)
Dept: OBSTETRICS AND GYNECOLOGY | Facility: CLINIC | Age: 39
End: 2024-05-21
Payer: MEDICAID

## 2024-05-21 ENCOUNTER — PROCEDURE VISIT (OUTPATIENT)
Dept: OBSTETRICS AND GYNECOLOGY | Facility: CLINIC | Age: 39
End: 2024-05-21
Payer: MEDICAID

## 2024-05-21 VITALS — SYSTOLIC BLOOD PRESSURE: 128 MMHG | WEIGHT: 293 LBS | BODY MASS INDEX: 43.72 KG/M2 | DIASTOLIC BLOOD PRESSURE: 76 MMHG

## 2024-05-21 DIAGNOSIS — O09.529 ANTEPARTUM MULTIGRAVIDA OF ADVANCED MATERNAL AGE: ICD-10-CM

## 2024-05-21 DIAGNOSIS — O99.210 OBESITY AFFECTING PREGNANCY, ANTEPARTUM, UNSPECIFIED OBESITY TYPE: ICD-10-CM

## 2024-05-21 DIAGNOSIS — O99.210 OTHER OBESITY AFFECTING PREGNANCY, ANTEPARTUM: ICD-10-CM

## 2024-05-21 DIAGNOSIS — R09.89 LABILE BLOOD PRESSURE: ICD-10-CM

## 2024-05-21 DIAGNOSIS — E66.8 OTHER OBESITY AFFECTING PREGNANCY, ANTEPARTUM: ICD-10-CM

## 2024-05-21 DIAGNOSIS — O09.529 ANTEPARTUM MULTIGRAVIDA OF ADVANCED MATERNAL AGE: Primary | ICD-10-CM

## 2024-05-21 PROCEDURE — 90715 TDAP VACCINE 7 YRS/> IM: CPT | Mod: PBBFAC,PN

## 2024-05-21 PROCEDURE — 76819 FETAL BIOPHYS PROFIL W/O NST: CPT | Mod: 26,S$PBB,, | Performed by: OBSTETRICS & GYNECOLOGY

## 2024-05-21 PROCEDURE — 99212 OFFICE O/P EST SF 10 MIN: CPT | Mod: PBBFAC,TH,PN | Performed by: ADVANCED PRACTICE MIDWIFE

## 2024-05-21 PROCEDURE — 76816 OB US FOLLOW-UP PER FETUS: CPT | Mod: PBBFAC,PN,H | Performed by: OBSTETRICS & GYNECOLOGY

## 2024-05-21 PROCEDURE — 99213 OFFICE O/P EST LOW 20 MIN: CPT | Mod: TH,S$PBB,, | Performed by: ADVANCED PRACTICE MIDWIFE

## 2024-05-21 PROCEDURE — 99999 PR PBB SHADOW E&M-EST. PATIENT-LVL II: CPT | Mod: PBBFAC,,, | Performed by: ADVANCED PRACTICE MIDWIFE

## 2024-05-21 PROCEDURE — 90471 IMMUNIZATION ADMIN: CPT | Mod: PBBFAC,PN

## 2024-05-21 PROCEDURE — 99999PBSHW TDAP VACCINE GREATER THAN OR EQUAL TO 7YO IM: Mod: PBBFAC,,,

## 2024-05-21 NOTE — PROGRESS NOTES
39 y.o. female  at 32w0d   Reports + FM, denies VB, LOF or CTX  Doing well without concerns   TW lbs   Tdap give today    Antepartum multigravida of advanced maternal age  Continue with daily baby aspirin.    Ultrasound-vertex, MVP 4.6 cm, EFW 4 lb 7 oz at 37 percentile with AC 77 percentile, BPP 8 8-reassuring    Other obesity affecting pregnancy, antepartum  Baby aspirin and ultrasound as above    Labile blood pressure  Normotensive today and asymptomatic    Reviewed warning signs, normal FKCs,  labor precautions and how/when to call. Patient states understanding  RTC x 1 wks, call or present sooner prn

## 2024-05-28 ENCOUNTER — PROCEDURE VISIT (OUTPATIENT)
Dept: OBSTETRICS AND GYNECOLOGY | Facility: CLINIC | Age: 39
End: 2024-05-28
Payer: MEDICAID

## 2024-05-28 ENCOUNTER — ROUTINE PRENATAL (OUTPATIENT)
Dept: OBSTETRICS AND GYNECOLOGY | Facility: CLINIC | Age: 39
End: 2024-05-28
Payer: MEDICAID

## 2024-05-28 VITALS — BODY MASS INDEX: 43.58 KG/M2 | DIASTOLIC BLOOD PRESSURE: 72 MMHG | WEIGHT: 293 LBS | SYSTOLIC BLOOD PRESSURE: 126 MMHG

## 2024-05-28 DIAGNOSIS — O09.529 ANTEPARTUM MULTIGRAVIDA OF ADVANCED MATERNAL AGE: ICD-10-CM

## 2024-05-28 DIAGNOSIS — E66.8 OTHER OBESITY AFFECTING PREGNANCY, ANTEPARTUM: ICD-10-CM

## 2024-05-28 DIAGNOSIS — O09.529 ANTEPARTUM MULTIGRAVIDA OF ADVANCED MATERNAL AGE: Primary | ICD-10-CM

## 2024-05-28 DIAGNOSIS — O99.210 OTHER OBESITY AFFECTING PREGNANCY, ANTEPARTUM: ICD-10-CM

## 2024-05-28 DIAGNOSIS — O99.210 OBESITY AFFECTING PREGNANCY, ANTEPARTUM, UNSPECIFIED OBESITY TYPE: ICD-10-CM

## 2024-05-28 PROCEDURE — 99999 PR PBB SHADOW E&M-EST. PATIENT-LVL II: CPT | Mod: PBBFAC,,, | Performed by: ADVANCED PRACTICE MIDWIFE

## 2024-05-28 PROCEDURE — 99213 OFFICE O/P EST LOW 20 MIN: CPT | Mod: TH,S$PBB,, | Performed by: ADVANCED PRACTICE MIDWIFE

## 2024-05-28 PROCEDURE — 99212 OFFICE O/P EST SF 10 MIN: CPT | Mod: PBBFAC,TH,PN | Performed by: ADVANCED PRACTICE MIDWIFE

## 2024-05-28 NOTE — PROGRESS NOTES
39 y.o. female  at 33w0d   Reports + FM, denies VB, LOF or CTX  Doing well without concerns   TWG: 3 lbs   Tdap given 2024  GBS next visit    Antepartum multigravida of advanced maternal age  Continue with daily baby aspirin.  Ultrasound today vertex, MVP 4 cm, BPP 8 8-reassuring.    Continue with weekly ultrasound visit    Other obesity affecting pregnancy, antepartum  As above    Reviewed warning signs, normal FKCs,  labor precautions and how/when to call. Patient states understanding  RTC x 1 wks, call or present sooner prn

## 2024-06-10 ENCOUNTER — PATIENT MESSAGE (OUTPATIENT)
Dept: OBSTETRICS AND GYNECOLOGY | Facility: CLINIC | Age: 39
End: 2024-06-10
Payer: MEDICAID

## 2024-06-11 ENCOUNTER — ROUTINE PRENATAL (OUTPATIENT)
Dept: OBSTETRICS AND GYNECOLOGY | Facility: CLINIC | Age: 39
End: 2024-06-11
Payer: MEDICAID

## 2024-06-11 ENCOUNTER — PROCEDURE VISIT (OUTPATIENT)
Dept: OBSTETRICS AND GYNECOLOGY | Facility: CLINIC | Age: 39
End: 2024-06-11
Payer: MEDICAID

## 2024-06-11 ENCOUNTER — PATIENT MESSAGE (OUTPATIENT)
Dept: OTHER | Facility: OTHER | Age: 39
End: 2024-06-11
Payer: MEDICAID

## 2024-06-11 VITALS — WEIGHT: 293 LBS | BODY MASS INDEX: 43.95 KG/M2 | DIASTOLIC BLOOD PRESSURE: 70 MMHG | SYSTOLIC BLOOD PRESSURE: 118 MMHG

## 2024-06-11 DIAGNOSIS — E66.8 OTHER OBESITY AFFECTING PREGNANCY, ANTEPARTUM: ICD-10-CM

## 2024-06-11 DIAGNOSIS — O99.210 OTHER OBESITY AFFECTING PREGNANCY, ANTEPARTUM: ICD-10-CM

## 2024-06-11 DIAGNOSIS — O99.210 OBESITY AFFECTING PREGNANCY, ANTEPARTUM, UNSPECIFIED OBESITY TYPE: ICD-10-CM

## 2024-06-11 DIAGNOSIS — O09.529 ANTEPARTUM MULTIGRAVIDA OF ADVANCED MATERNAL AGE: Primary | ICD-10-CM

## 2024-06-11 DIAGNOSIS — O09.529 ANTEPARTUM MULTIGRAVIDA OF ADVANCED MATERNAL AGE: ICD-10-CM

## 2024-06-11 PROCEDURE — 99211 OFF/OP EST MAY X REQ PHY/QHP: CPT | Mod: PBBFAC,TH,PN | Performed by: ADVANCED PRACTICE MIDWIFE

## 2024-06-11 PROCEDURE — 76819 FETAL BIOPHYS PROFIL W/O NST: CPT | Mod: PBBFAC,PN,H | Performed by: OBSTETRICS & GYNECOLOGY

## 2024-06-11 PROCEDURE — 99999 PR PBB SHADOW E&M-EST. PATIENT-LVL I: CPT | Mod: PBBFAC,,, | Performed by: ADVANCED PRACTICE MIDWIFE

## 2024-06-11 PROCEDURE — 99213 OFFICE O/P EST LOW 20 MIN: CPT | Mod: TH,S$PBB,, | Performed by: ADVANCED PRACTICE MIDWIFE

## 2024-06-11 NOTE — PROGRESS NOTES
39 y.o. female  at 35w0d   Reports + FM, denies VB, LOF or CTX  Doing well without concerns   TW lbs   Tdap received  GBS next visit    Antepartum multigravida of advanced maternal age  Continue with daily baby aspirin until next week.    Ultrasound-vertex, MVP 3.8 cm, BPP 8 8-reassuring    Other obesity affecting pregnancy, antepartum  As above    Reviewed warning signs, normal FKCs,  labor precautions and how/when to call. Patient states understanding  RTC x 1 wks, call or present sooner prn

## 2024-06-19 ENCOUNTER — ROUTINE PRENATAL (OUTPATIENT)
Dept: OBSTETRICS AND GYNECOLOGY | Facility: CLINIC | Age: 39
End: 2024-06-19
Payer: MEDICAID

## 2024-06-19 ENCOUNTER — PROCEDURE VISIT (OUTPATIENT)
Dept: OBSTETRICS AND GYNECOLOGY | Facility: CLINIC | Age: 39
End: 2024-06-19
Payer: MEDICAID

## 2024-06-19 VITALS
DIASTOLIC BLOOD PRESSURE: 78 MMHG | BODY MASS INDEX: 42.81 KG/M2 | WEIGHT: 289.88 LBS | SYSTOLIC BLOOD PRESSURE: 128 MMHG

## 2024-06-19 DIAGNOSIS — O99.210 OBESITY AFFECTING PREGNANCY, ANTEPARTUM, UNSPECIFIED OBESITY TYPE: ICD-10-CM

## 2024-06-19 DIAGNOSIS — O09.529 ANTEPARTUM MULTIGRAVIDA OF ADVANCED MATERNAL AGE: ICD-10-CM

## 2024-06-19 DIAGNOSIS — O09.40 GRAND MULTIPARITY, WITH CURRENT PREGNANCY, ANTEPARTUM: Primary | ICD-10-CM

## 2024-06-19 PROCEDURE — 99213 OFFICE O/P EST LOW 20 MIN: CPT | Mod: TH,S$PBB,, | Performed by: ADVANCED PRACTICE MIDWIFE

## 2024-06-19 PROCEDURE — 99999 PR PBB SHADOW E&M-EST. PATIENT-LVL II: CPT | Mod: PBBFAC,,, | Performed by: ADVANCED PRACTICE MIDWIFE

## 2024-06-19 PROCEDURE — 87081 CULTURE SCREEN ONLY: CPT | Performed by: ADVANCED PRACTICE MIDWIFE

## 2024-06-19 PROCEDURE — 76819 FETAL BIOPHYS PROFIL W/O NST: CPT | Mod: 26,S$PBB,, | Performed by: OBSTETRICS & GYNECOLOGY

## 2024-06-19 PROCEDURE — 99212 OFFICE O/P EST SF 10 MIN: CPT | Mod: PBBFAC,TH,25 | Performed by: ADVANCED PRACTICE MIDWIFE

## 2024-06-19 PROCEDURE — 76816 OB US FOLLOW-UP PER FETUS: CPT | Mod: PBBFAC | Performed by: OBSTETRICS & GYNECOLOGY

## 2024-06-20 NOTE — PROGRESS NOTES
39 y.o. female  at 36w2d  Reports + FM, denies VB, LOF or regular CTX  Doing well without concerns   Considering BTL will let us know and sign consent NV      GBS collected today   The skin of the suprapubic region was evaluated and appears intact without lesions.    VE per pt request closed    US today for growth/position: cephalic, EFW 57 %tile, normal MVP 3.2cm    Grand multiparity, with current pregnancy, antepartum  -     Strep B Screen, Vaginal / Rectal    Antepartum multigravida of advanced maternal age  Continue with weekly testing    Obesity affecting pregnancy, antepartum, unspecified obesity type      Reviewed warning signs, normal FKCs, labor precautions and how/when to call.  RTC x 1 wk, call or present sooner prn.

## 2024-06-22 LAB — BACTERIA SPEC AEROBE CULT: NORMAL

## 2024-06-25 ENCOUNTER — PROCEDURE VISIT (OUTPATIENT)
Dept: OBSTETRICS AND GYNECOLOGY | Facility: CLINIC | Age: 39
End: 2024-06-25
Payer: MEDICAID

## 2024-06-25 ENCOUNTER — ROUTINE PRENATAL (OUTPATIENT)
Dept: OBSTETRICS AND GYNECOLOGY | Facility: CLINIC | Age: 39
End: 2024-06-25
Payer: MEDICAID

## 2024-06-25 VITALS — DIASTOLIC BLOOD PRESSURE: 66 MMHG | BODY MASS INDEX: 43.4 KG/M2 | SYSTOLIC BLOOD PRESSURE: 126 MMHG | WEIGHT: 293 LBS

## 2024-06-25 DIAGNOSIS — O99.210 OBESITY AFFECTING PREGNANCY, ANTEPARTUM, UNSPECIFIED OBESITY TYPE: ICD-10-CM

## 2024-06-25 DIAGNOSIS — E66.8 OTHER OBESITY AFFECTING PREGNANCY, ANTEPARTUM: ICD-10-CM

## 2024-06-25 DIAGNOSIS — O99.210 OTHER OBESITY AFFECTING PREGNANCY, ANTEPARTUM: ICD-10-CM

## 2024-06-25 DIAGNOSIS — O09.529 ANTEPARTUM MULTIGRAVIDA OF ADVANCED MATERNAL AGE: Primary | ICD-10-CM

## 2024-06-25 DIAGNOSIS — R09.89 LABILE BLOOD PRESSURE: ICD-10-CM

## 2024-06-25 DIAGNOSIS — O09.529 ANTEPARTUM MULTIGRAVIDA OF ADVANCED MATERNAL AGE: ICD-10-CM

## 2024-06-25 PROCEDURE — 99213 OFFICE O/P EST LOW 20 MIN: CPT | Mod: TH,S$PBB,, | Performed by: ADVANCED PRACTICE MIDWIFE

## 2024-06-25 PROCEDURE — 99999 PR PBB SHADOW E&M-EST. PATIENT-LVL II: CPT | Mod: PBBFAC,,, | Performed by: ADVANCED PRACTICE MIDWIFE

## 2024-06-25 PROCEDURE — 76819 FETAL BIOPHYS PROFIL W/O NST: CPT | Mod: PBBFAC,PN,H | Performed by: OBSTETRICS & GYNECOLOGY

## 2024-06-25 PROCEDURE — 99212 OFFICE O/P EST SF 10 MIN: CPT | Mod: PBBFAC,TH,PN | Performed by: ADVANCED PRACTICE MIDWIFE

## 2024-06-25 NOTE — PROGRESS NOTES
39 y.o. female  at 37w0d   Reports + FM, denies VB, LOF or regular CTX  Doing well without concerns   TW lbs   GBS: Negative    Antepartum multigravida of advanced maternal age  Weekly ultrasound, today vertex, MVP 4.4 cm, BPP 8 8-reassuring    Other obesity affecting pregnancy, antepartum  Minimal weight gain ultrasound as above    Labile blood pressure  Normotensive today and feeling well    Reviewed warning signs, normal FKCs, labor precautions and how/when to call. Patient states understanding  RTC x 1 wks, call or present sooner prn

## 2024-07-02 ENCOUNTER — PROCEDURE VISIT (OUTPATIENT)
Dept: OBSTETRICS AND GYNECOLOGY | Facility: CLINIC | Age: 39
End: 2024-07-02
Payer: MEDICAID

## 2024-07-02 ENCOUNTER — ROUTINE PRENATAL (OUTPATIENT)
Dept: OBSTETRICS AND GYNECOLOGY | Facility: CLINIC | Age: 39
End: 2024-07-02
Payer: MEDICAID

## 2024-07-02 VITALS — SYSTOLIC BLOOD PRESSURE: 126 MMHG | BODY MASS INDEX: 44.2 KG/M2 | DIASTOLIC BLOOD PRESSURE: 72 MMHG | WEIGHT: 293 LBS

## 2024-07-02 DIAGNOSIS — O99.210 OBESITY AFFECTING PREGNANCY, ANTEPARTUM, UNSPECIFIED OBESITY TYPE: ICD-10-CM

## 2024-07-02 DIAGNOSIS — O09.40 GRAND MULTIPARITY, WITH CURRENT PREGNANCY, ANTEPARTUM: ICD-10-CM

## 2024-07-02 DIAGNOSIS — O09.529 ANTEPARTUM MULTIGRAVIDA OF ADVANCED MATERNAL AGE: Primary | ICD-10-CM

## 2024-07-02 DIAGNOSIS — O09.529 ANTEPARTUM MULTIGRAVIDA OF ADVANCED MATERNAL AGE: ICD-10-CM

## 2024-07-02 DIAGNOSIS — R09.89 LABILE BLOOD PRESSURE: ICD-10-CM

## 2024-07-02 PROCEDURE — 99213 OFFICE O/P EST LOW 20 MIN: CPT | Mod: PBBFAC,25,TH,PN | Performed by: ADVANCED PRACTICE MIDWIFE

## 2024-07-02 PROCEDURE — 99999 PR PBB SHADOW E&M-EST. PATIENT-LVL III: CPT | Mod: PBBFAC,,, | Performed by: ADVANCED PRACTICE MIDWIFE

## 2024-07-02 PROCEDURE — 76819 FETAL BIOPHYS PROFIL W/O NST: CPT | Mod: PBBFAC,PN | Performed by: OBSTETRICS & GYNECOLOGY

## 2024-07-02 NOTE — PROGRESS NOTES
39 y.o. female  at 38w0d   Reports + FM, denies VB, LOF or regular CTX  Doing well without concerns   TW lbs   GBS: Negative  Reports intermittent headache 2/10 pain, has not taken any medication for it. Denies changes in vision or chest pain.  Cervical exam: 1.580/-3    Antepartum multigravida of advanced maternal age  BPP today .  Instructed to stop baby aspirin (last taken this morning 2024)    Grand multiparity, with current pregnancy, antepartum    Obesity affecting pregnancy, antepartum, unspecified obesity type    Labile blood pressure  Reviewed pre-eclampsia symptoms.      Reviewed warning signs, normal FKCs, labor precautions and how/when to call. Patient states understanding  RTC x 1 wks, call or present sooner petern     SIDDHARTH HARRY

## 2024-07-02 NOTE — PATIENT INSTRUCTIONS
Patient Education       Pregnancy - The Ninth Month   About this topic   It is important for you to learn how to take care of yourself to help you have a healthy baby and safe delivery. It is good to have health care throughout your pregnancy.  The ninth month of your pregnancy starts around week 37 and lasts through delivery. By knowing how far along you are, you can learn what is normal for this stage of your pregnancy and plan for what is next.  General   Your body   During the ninth month of your pregnancy, here are some things you can expect.  You may:  Lose your mucous plug as your cervix starts to get thinner and dilate.  Notice a small amount of streaky red or pink spotting.  Your doctor may discuss stripping your membranes to help the labor progress.  Go into labor any time. Most women deliver their baby between 38 and 42 weeks.  Notice your belly button sticks out. It should go back to normal after you give birth.  Have a bit of extra energy as you get ready for your baby  Notice your breasts are leaking more. This is normal as your body is making the first milk you can feed your baby.  Have tests to check on how your baby is doing. Your doctor will most likely not let you be pregnant for more than 42 weeks.  Not gain any weight this month. Some mothers even lose 1 to 2 pounds (.45 to .9 kg).  Your baby's growth and development:  Your baby has been busy swallowing fluid and building up waste products for their first bowel movement.  Your baby may start sucking their thumb.  They may come out with dry skin, bruises, or a misshapen head. Living in a watery fluid and going through labor is tough on your baby too. These are all normal and will change in the first weeks of life.  Your baby may have lots of hair on their head or not very much at all. Long fingernails are normal.  Your baby is about 20 inches (51 cm) long and weighs about 7 1/2 pounds (3,400 gm). Your baby is about the size of a  watermelon.  Things to Think About   Avoid alcohol, drugs, tobacco products, and second hand smoke.  Talk to your doctor if you plan to travel or get on a plane.  Have your bag packed so you are ready for delivery.  Are you planning a natural childbirth or thinking about an epidural? Know things you can do to help cope with labor pain.  If you are having a boy, decide if you want to have him circumcised.  Be sure the car seat is installed the right way so you are ready to bring your baby home.  When do I need to call the doctor?   Contractions every 5 minutes or more often that do not go away with rest, drinking water, or position changes  Headache that does not go away; blurry vision; seeing spots or halos; increase in swelling in your hands, feet, or face; and pain under your ribs on the right side  Low, dull back pain that does not go away  Pressure in your pelvis that feels like your baby is pushing down  A gush or constant trickle of watery or bloody fluid leaking from your vagina  Little to no movement felt by baby in 2 hours. Your baby should be moving at least 10 times every 2 hours.  Vaginal bleeding with or without pain  Fever of 100.4°F (38°C) or higher  After a car accident, fall, or any trauma to your belly  Having thoughts of harming yourself or others, or do not feel safe at home  Where can I learn more?   American Academy of Family Physicians  https://familydoctor.org/changes-in-your-body-during-pregnancy-third-trimester/   Kids Health  http://kidshealth.org/en/parents/pregnancy-calendar-intro.html?WT.ac=p-barbie   Office on Womens Health  https://www.womenshealth.gov/pregnancy/youre-pregnant-now-what/stages-pregnancy   Last Reviewed Date   2020-05-06  Consumer Information Use and Disclaimer   This information is not specific medical advice and does not replace information you receive from your health care provider. This is only a brief summary of general information. It does NOT include all  "information about conditions, illnesses, injuries, tests, procedures, treatments, therapies, discharge instructions or life-style choices that may apply to you. You must talk with your health care provider for complete information about your health and treatment options. This information should not be used to decide whether or not to accept your health care providers advice, instructions or recommendations. Only your health care provider has the knowledge and training to provide advice that is right for you.  Copyright   Copyright ©  UpToDate, Inc. and its affiliates and/or licensors. All rights reserved.  Patient Education       How to Tell When Labor Starts   The Basics   Written by the doctors and editors at [facility]   What is labor? -- Labor is the way your body prepares to give birth when you are pregnant. Labor usually starts on its own between 37 and 42 weeks of pregnancy. Your "due date" is at 40 weeks.  A pregnancy that lasts 37 to 42 weeks is called a "term" pregnancy. When labor starts before 37 weeks, doctors call it "" labor.  What are the signs that labor is starting? -- The different signs that labor is starting can include the following:  The baby moves lower (or "drops") in your belly.  You have increased vaginal discharge that is thick, mucus-like, or slightly bloody. ("Vaginal discharge" is the term doctors use to describe the fluid that comes out of the vagina.) The increased vaginal discharge is sometimes called a "mucus plug" or a "bloody show."  Your "water breaks." During pregnancy, your baby is in a sac in your uterus and surrounded by a fluid called "amniotic fluid." This sac typically breaks open sometime before your baby is born. When it breaks open, the fluid inside comes out of your vagina. This can feel like a big gush or just a trickle of fluid.  You have low back pain or belly cramps.  You start having contractions. During a contraction, the uterus tightens. This can be " "painful and make your belly feel hard. After a contraction, the uterus relaxes and the pain goes away. Some people have "Logan Wang contractions" or "false-labor contractions." These feel like contractions, but they are not true contractions. They do not mean that you are in labor.  How can I tell if I'm having true contractions? -- It can be hard to tell if you are having true contractions or Logan Wang contractions. But here are some ways to help tell the difference.  True contractions come every few minutes and get more frequent over time. Sagar Wang contractions can come every few minutes, but they don't get more frequent over time.  True contractions don't go away, even when you rest. Logan Wang contractions usually go away when you rest.  True contractions will get stronger and more painful over time. Logan Wang contractions usually don't get stronger or more painful over time.  True contractions might be felt in your back and front. Logan Wang contractions are usually only in front.  If you are still not sure whether you are having true contractions, call your doctor or midwife.  What should I do if I start having contractions? -- If you start having contractions, you should time them to see how far apart they are. That way, you can tell if they get more frequent.  You can time your contractions by keeping track of the time when each contraction starts. If you have a clock with a second hand or a timer on your smartphone, you can also time how long each contraction lasts. Your doctor or midwife will want to know how far apart your contractions are and how long they last.  When should I call my doctor or midwife? -- Call your doctor or midwife if you think you are in labor. You should also call if any of the following things happen:  You have blood, mucus, or fluid leaking from your vagina.  You have 6 or more contractions in 1 hour. (That means your contractions are 10 minutes apart or " "less.)  Your contractions are getting stronger and are painful.  Your doctor or midwife will probably want to see you to do an exam. To tell if you are in labor, they will check your cervix to see if it is opening ("dilated") and thinning out. They will see how frequent your contractions are. They might also do other tests.  What if my labor starts too soon? -- If you start having any symptoms of labor before 37 weeks, call your doctor right away. They might want to give you medicine to try to stop your labor.  What if my labor doesn't start on its own? -- If your labor doesn't start on its own, your doctor will talk to you about your options. They might try to start your labor with medicines. This is called "inducing labor."  How long will my labor last? -- If it's your first baby, your labor will probably last for many hours. If it's not your first baby, your labor will probably be shorter.  All topics are updated as new evidence becomes available and our peer review process is complete.  This topic retrieved from [employer] on: Sep 21, 2021.  Topic 01210 Version 9.0  Release: 29.4.2 - C29.263  © 2021 UpToDate, Inc. and/or its affiliates. All rights reserved.  Consumer Information Use and Disclaimer   This information is not specific medical advice and does not replace information you receive from your health care provider. This is only a brief summary of general information. It does NOT include all information about conditions, illnesses, injuries, tests, procedures, treatments, therapies, discharge instructions or life-style choices that may apply to you. You must talk with your health care provider for complete information about your health and treatment options. This information should not be used to decide whether or not to accept your health care provider's advice, instructions or recommendations. Only your health care provider has the knowledge and training to provide advice that is right for you. The use of " this information is governed by the [employer] End User License Agreement, available at https://www.[employer]/en/solutions/[employer]/about/mayo.The use of [employer] content is governed by the [employer] Terms of Use. ©2021 UpToDate, Inc. All rights reserved.  Copyright   © 2021 UpToDate, Inc. and/or its affiliates. All rights reserved.  Pregnancy and Childbirth: What to Bring to the Hospital    You're likely feeling anxious as your child's birth approaches. This is normal. To give yourself some peace of mind, pack a bag ahead of time. Do this about 1 month ahead of your estimated delivery date. Here is a list of things to remember:  Personal care items, like a toothbrush, hair brush, lip balm, lotion, and shampoo  Eyeglasses (if you wear them)  Nightgown (if you plan to breastfeed, pack 1 that allows for nursing)  Nursing bra if you plan to breastfeed  Bathrobe and slippers  Many hospitals provide maternity underwear, but you may want to bring underwear that can be soiled because you will have bleeding after delivery  Comfortable clothes for you to wear home, like sweat pants, yoga pants, or other stretchable clothes, because your prepregnancy clothes may not fit after delivery of your baby  Clothes for your baby to wear home  Personal music player and headphones  Camera with new batteries or   Coins for vending machines  Telephone numbers of people to call after the birth  Cell phone and   Insurance information and any other paperwork needed for your hospital stay  A list of baby names you are considering  An infant, rear-facing car seat for bringing home your baby (this is required by law)  Add anything else that you don't want to  forget:  _____________________________________  _____________________________________  _____________________________________  _____________________________________  _____________________________________  _____________________________________  _____________________________________ Patient Education       Fetal Movement   About this topic   Feeling your baby move for the first time is a good sign that your baby is doing well. You may begin to feel these movements between the 18th and 25th weeks of your pregnancy. If this is your first time being pregnant, it may be closer to 25 weeks. Your baby has been moving around before this, but the kicks have not been strong enough for you to feel. During the first weeks of feeling movement, you may start to see a pattern during the day when your baby is most active. You can track your baby's kicks each day at home. This is also known as kick counting. It is a good way to check on your baby's movements and well being.  Most often, fetal kick counting is used in high-risk pregnancies. It may be useful for all pregnancies. Counting and writing down your baby's kicks, jabs, twists, flutters, rolls, turns, flips, and swishes may help find a problem that needs more evaluation. The American College of Obstetricians and Gynecologists, or ACOG, suggests that you record how much time it takes you to feel 10 of these movements. Ideally, you should be able to feel 10 movements within 2 hours. Many people will track these movements in much less time.  General   How to Track Your Baby's Kick Counts   Most often your doctor will want you to wait until the 28th to 30th weeks of your pregnancy to start kick counting. Here are some tips to help you get started.  Find the time of day when your baby is most active. For some people, this is right after eating. Others find their baby moving a lot after they have been exercising or more active. Some babies are more active in the evenings when your  "blood sugar starts to lower.  Try to count kicks at about the same time each day.  Before you start counting, have something to eat or drink. Also take a short walk or do some light activity.  Choose a quiet place where you can focus on your baby's movements. Also get in a comfortable position. Try and lie on one side or the other. You may need to change positions until you find one that works best for you and your baby.  Keep a notebook to track your baby's kicks. Your doctor may give you a chart to use or you can make your own. Write down the date, time you started counting, and the time of each "kick" during a 2-hour period until you have felt 10 kicks.  Once you have recorded 10 kicks within 2 hours you can stop counting.  If you are not able to record 10 movements over 2 hours you should get up and move around or eat something and try again.  If you are not able to record 10 movements over 2 hours the second time, call your doctor. They may want you to go to the hospital to get your baby checked.  When do I need to call the doctor?   You have felt less than 10 movements over a period of 2 hours.  It takes longer each day to record 10 movements.  There is a big change in the pattern of movements you are writing down.  You feel no movement for 2 hours even after eating a snack, light activity, and position changes.  Teach Back: Helping You Understand   The Teach Back Method helps you understand the information we are giving you. After you talk with the staff, tell them in your own words what you learned. This helps to make sure the staff has described each thing clearly. It also helps to explain things that may have been confusing. Before going home, make sure you can do these:  I can tell you about feeling my baby move.  I can tell you how I will track my babys kicks.  I can tell you what I will do if I feel less than 10 movements in 2 hours, it takes longer to feel my baby move 10 times, or there is a big " change in how my baby is moving.  Last Reviewed Date   2021-10-08  Consumer Information Use and Disclaimer   This information is not specific medical advice and does not replace information you receive from your health care provider. This is only a brief summary of general information. It does NOT include all information about conditions, illnesses, injuries, tests, procedures, treatments, therapies, discharge instructions or life-style choices that may apply to you. You must talk with your health care provider for complete information about your health and treatment options. This information should not be used to decide whether or not to accept your health care providers advice, instructions or recommendations. Only your health care provider has the knowledge and training to provide advice that is right for you.  Copyright   Copyright © 2021 UpToDate, Inc. and its affiliates and/or licensors. All rights reserved.

## 2024-07-08 ENCOUNTER — PATIENT MESSAGE (OUTPATIENT)
Dept: OBSTETRICS AND GYNECOLOGY | Facility: CLINIC | Age: 39
End: 2024-07-08
Payer: MEDICAID

## 2024-07-12 DIAGNOSIS — Z34.90 ENCOUNTER FOR ELECTIVE INDUCTION OF LABOR: Primary | ICD-10-CM

## 2024-07-12 PROBLEM — O26.899 PELVIC PRESSURE IN PREGNANCY: Status: ACTIVE | Noted: 2024-07-12

## 2024-07-12 PROBLEM — R10.2 PELVIC PRESSURE IN PREGNANCY: Status: ACTIVE | Noted: 2024-07-12

## 2024-07-12 PROBLEM — R09.89 LABILE BLOOD PRESSURE: Status: RESOLVED | Noted: 2023-03-29 | Resolved: 2024-07-12

## 2024-07-12 NOTE — H&P (VIEW-ONLY)
[facility] - Labor & Delivery  Obstetrics  History & Physical    Patient Name: Mitzy Malik  MRN: [id number]  Admission Date: 2024  Primary Care Provider: Cindy, Primary Doctor    Subjective:     Principal Problem:Pelvic pressure in pregnancy    History of Present Illness:   39.3 c/o pelvic pressure. States her clinic appointment was cancelled & she was unable to schedule her induction. She is requesting an induction. No regular contractions, VB, LOF. Good fetal movement.     Obstetric HPI:  Patient reports irregular contractions, active fetal movement, No vaginal bleeding , No loss of fluid     This pregnancy has been complicated by:  AMA  Grand multiparity   Mild asthma   Obesity    OB History    Para Term  AB Living   10 5 5 0 4 5   SAB IAB Ectopic Multiple Live Births   4 0 0 0 5      # Outcome Date GA Lbr Drake/2nd Weight Sex Type Anes PTL Lv   10 Current            9 Term 23 39w1d / 00:05 3.19 kg (7 lb 0.5 oz) F Vag-Spont EPI N NOAM      Name: IRAIDA,GIRL MITZY      Apgar1: 9  Apgar5: 9   8 SAB 05/10/20           7 Term 18 39w6d / 00:05 3.47 kg (7 lb 10.4 oz) F Vag-Spont EPI N NOAM      Name: VANDANA, GIRL MITZY      Apgar1: 9  Apgar5: 9   6 Term 02/10/15 40w0d  3.912 kg (8 lb 10 oz) F Vag-Spont EPI N NOAM   5 Term 03 40w0d 01:00 3.969 kg (8 lb 12 oz) F Vag-Spont None N NOAM   4 Term 99 40w0d 12:00 4.252 kg (9 lb 6 oz) M Vag-Spont None N NOAM   3 SAB      SAB      2 SAB            1 SAB              Past Medical History:   Diagnosis Date    Abnormal Pap smear 2005    colposcopy    Abnormal Pap smear of cervix         Asthma affecting pregnancy, antepartum 3/12/2018    Miscarriage      Past Surgical History:   Procedure Laterality Date    Right Knee         PTA Medications   Medication Sig    albuterol (PROVENTIL/VENTOLIN HFA) 90 mcg/actuation inhaler Inhale 2 puffs into the lungs every 6 (six) hours as needed.    ibuprofen (ADVIL,MOTRIN) 600 MG  tablet Take 1 tablet (600 mg total) by mouth every 6 (six) hours as needed for Pain.    PNV,calcium 72-iron-folic acid (PRENATAL VITAMIN PLUS LOW IRON) 27 mg iron- 1 mg Tab Take 1 tablet (1 each total) by mouth once daily.    SYMBICORT 80-4.5 mcg/actuation HFAA Inhale 1 puff into the lungs once daily.       Review of patient's allergies indicates:  No Known Allergies     Family History    None       Tobacco Use    Smoking status: Never     Passive exposure: Never    Smokeless tobacco: Never   Substance and Sexual Activity    Alcohol use: No    Drug use: No    Sexual activity: Yes     Partners: Male     Birth control/protection: None     Review of Systems   Gastrointestinal:  Positive for abdominal pain (irregular cramping).   Genitourinary:  Positive for pelvic pain. Negative for vaginal bleeding.   All other systems reviewed and are negative.     Objective:     Vital Signs (Most Recent):  Pulse: 75 (07/12/24 1230)  BP: 117/67 (07/12/24 1230) Vital Signs (24h Range):  Pulse:  [75] 75  BP: (117)/(67) 117/67        There is no height or weight on file to calculate BMI.    FHT: Cat 1 (reassuring)  TOCO:  Irregular, + irritability      Physical Exam:   Constitutional: She is oriented to person, place, and time. She appears well-developed and well-nourished.    HENT:   Head: Normocephalic and atraumatic.    Eyes: Conjunctivae and EOM are normal.     Cardiovascular:  Normal rate.             Pulmonary/Chest: Effort normal. No respiratory distress.        Abdominal: Soft. She exhibits no distension. There is no abdominal tenderness.     Genitourinary:    Vagina and uterus normal.             Musculoskeletal: Normal range of motion and moves all extremeties.       Neurological: She is alert and oriented to person, place, and time.    Skin: Skin is warm and dry.    Psychiatric: She has a normal mood and affect. Her behavior is normal. Judgment and thought content normal.        Cervix:  Dilation:  3  Effacement:   60%  Station: -2  Presentation: Vertex per RN exam     Significant Labs:  Lab Results   Component Value Date    GROUPTRH A POS 2024    HEPBSAG Non-reactive 2024    STREPBCULT No Group B Streptococcus isolated 2024       I have personallly reviewed all pertinent lab results from the last 24 hours.  None  Assessment/Plan:     39 y.o. female  at 39w3d for:    * Pelvic pressure in pregnancy  OB triage  Reactive NST  EIOL scheduled for  at 7AM, aware may be pushed off due to unit census  Declines Flexeril  D/c to home with usual precautions        Eugenia Escobar, DEANNA  Obstetrics  [facility] - Labor & Delivery

## 2024-07-13 ENCOUNTER — HOSPITAL ENCOUNTER (INPATIENT)
Facility: HOSPITAL | Age: 39
LOS: 2 days | Discharge: HOME | End: 2024-07-15
Attending: OBSTETRICS & GYNECOLOGY | Admitting: OBSTETRICS & GYNECOLOGY
Payer: MEDICAID

## 2024-07-13 DIAGNOSIS — O09.529 ANTEPARTUM MULTIGRAVIDA OF ADVANCED MATERNAL AGE: ICD-10-CM

## 2024-07-13 PROBLEM — I10 HYPERTENSION, ESSENTIAL: Status: ACTIVE | Noted: 2019-10-29

## 2024-07-13 PROBLEM — O26.899 PELVIC PRESSURE IN PREGNANCY: Status: RESOLVED | Noted: 2024-07-12 | Resolved: 2024-07-13

## 2024-07-13 PROBLEM — F41.1 GENERALIZED ANXIETY DISORDER: Status: ACTIVE | Noted: 2020-10-14

## 2024-07-13 PROBLEM — R10.2 PELVIC PRESSURE IN PREGNANCY: Status: RESOLVED | Noted: 2024-07-12 | Resolved: 2024-07-13

## 2024-07-13 LAB
ABO + RH BLD: NORMAL
ALBUMIN SERPL BCP-MCNC: 2.8 G/DL (ref 3.5–5.2)
ALP SERPL-CCNC: 225 U/L (ref 55–135)
ALT SERPL W/O P-5'-P-CCNC: 10 U/L (ref 10–44)
ANION GAP SERPL CALC-SCNC: 8 MMOL/L (ref 8–16)
AST SERPL-CCNC: 23 U/L (ref 10–40)
BASOPHILS # BLD AUTO: 0.02 K/UL (ref 0–0.2)
BASOPHILS NFR BLD: 0.4 % (ref 0–1.9)
BILIRUB SERPL-MCNC: 0.3 MG/DL (ref 0.1–1)
BLD GP AB SCN CELLS X3 SERPL QL: NORMAL
BUN SERPL-MCNC: 6 MG/DL (ref 6–20)
CALCIUM SERPL-MCNC: 8.7 MG/DL (ref 8.7–10.5)
CHLORIDE SERPL-SCNC: 107 MMOL/L (ref 95–110)
CO2 SERPL-SCNC: 23 MMOL/L (ref 23–29)
CREAT SERPL-MCNC: 0.7 MG/DL (ref 0.5–1.4)
DIFFERENTIAL METHOD BLD: ABNORMAL
EOSINOPHIL # BLD AUTO: 0.1 K/UL (ref 0–0.5)
EOSINOPHIL NFR BLD: 1.1 % (ref 0–8)
ERYTHROCYTE [DISTWIDTH] IN BLOOD BY AUTOMATED COUNT: 14.2 % (ref 11.5–14.5)
EST. GFR  (NO RACE VARIABLE): >60 ML/MIN/1.73 M^2
GLUCOSE SERPL-MCNC: 98 MG/DL (ref 70–110)
HCT VFR BLD AUTO: 39.6 % (ref 37–48.5)
HGB BLD-MCNC: 13.1 G/DL (ref 12–16)
HIV 1+2 AB+HIV1 P24 AG SERPL QL IA: NEGATIVE
IMM GRANULOCYTES # BLD AUTO: 0.02 K/UL (ref 0–0.04)
IMM GRANULOCYTES NFR BLD AUTO: 0.4 % (ref 0–0.5)
LYMPHOCYTES # BLD AUTO: 1.6 K/UL (ref 1–4.8)
LYMPHOCYTES NFR BLD: 35.3 % (ref 18–48)
MCH RBC QN AUTO: 32.6 PG (ref 27–31)
MCHC RBC AUTO-ENTMCNC: 33.1 G/DL (ref 32–36)
MCV RBC AUTO: 99 FL (ref 82–98)
MONOCYTES # BLD AUTO: 0.2 K/UL (ref 0.3–1)
MONOCYTES NFR BLD: 4.6 % (ref 4–15)
NEUTROPHILS # BLD AUTO: 2.7 K/UL (ref 1.8–7.7)
NEUTROPHILS NFR BLD: 58.2 % (ref 38–73)
NRBC BLD-RTO: 0 /100 WBC
PLATELET # BLD AUTO: 225 K/UL (ref 150–450)
PMV BLD AUTO: 10.4 FL (ref 9.2–12.9)
POTASSIUM SERPL-SCNC: 3.7 MMOL/L (ref 3.5–5.1)
PROT SERPL-MCNC: 7.3 G/DL (ref 6–8.4)
RBC # BLD AUTO: 4.02 M/UL (ref 4–5.4)
SODIUM SERPL-SCNC: 138 MMOL/L (ref 136–145)
TREPONEMA PALLIDUM IGG+IGM AB [PRESENCE] IN SERUM OR PLASMA BY IMMUNOASSAY: NONREACTIVE
WBC # BLD AUTO: 4.59 K/UL (ref 3.9–12.7)

## 2024-07-13 PROCEDURE — 10907ZC DRAINAGE OF AMNIOTIC FLUID, THERAPEUTIC FROM PRODUCTS OF CONCEPTION, VIA NATURAL OR ARTIFICIAL OPENING: ICD-10-PCS | Performed by: OBSTETRICS & GYNECOLOGY

## 2024-07-13 PROCEDURE — 11000001 HC ACUTE MED/SURG PRIVATE ROOM

## 2024-07-13 PROCEDURE — 59409 OBSTETRICAL CARE: CPT | Mod: GB,,, | Performed by: ADVANCED PRACTICE MIDWIFE

## 2024-07-13 PROCEDURE — 86593 SYPHILIS TEST NON-TREP QUANT: CPT | Performed by: ADVANCED PRACTICE MIDWIFE

## 2024-07-13 PROCEDURE — 72200005 HC VAGINAL DELIVERY LEVEL II

## 2024-07-13 PROCEDURE — 72100002 HC LABOR CARE, 1ST 8 HOURS

## 2024-07-13 PROCEDURE — 86850 RBC ANTIBODY SCREEN: CPT | Performed by: ADVANCED PRACTICE MIDWIFE

## 2024-07-13 PROCEDURE — 63600175 PHARM REV CODE 636 W HCPCS: Performed by: ADVANCED PRACTICE MIDWIFE

## 2024-07-13 PROCEDURE — 85025 COMPLETE CBC W/AUTO DIFF WBC: CPT | Performed by: ADVANCED PRACTICE MIDWIFE

## 2024-07-13 PROCEDURE — 87389 HIV-1 AG W/HIV-1&-2 AB AG IA: CPT | Performed by: ADVANCED PRACTICE MIDWIFE

## 2024-07-13 PROCEDURE — 80053 COMPREHEN METABOLIC PANEL: CPT | Performed by: ADVANCED PRACTICE MIDWIFE

## 2024-07-13 RX ORDER — DIPHENOXYLATE HYDROCHLORIDE AND ATROPINE SULFATE 2.5; .025 MG/1; MG/1
2 TABLET ORAL EVERY 6 HOURS PRN
Status: DISCONTINUED | OUTPATIENT
Start: 2024-07-13 | End: 2024-07-13

## 2024-07-13 RX ORDER — PRENATAL WITH FERROUS FUM AND FOLIC ACID 3080; 920; 120; 400; 22; 1.84; 3; 20; 10; 1; 12; 200; 27; 25; 2 [IU]/1; [IU]/1; MG/1; [IU]/1; MG/1; MG/1; MG/1; MG/1; MG/1; MG/1; UG/1; MG/1; MG/1; MG/1; MG/1
1 TABLET ORAL DAILY
Status: DISCONTINUED | OUTPATIENT
Start: 2024-07-14 | End: 2024-07-15 | Stop reason: HOSPADM

## 2024-07-13 RX ORDER — MISOPROSTOL 200 UG/1
800 TABLET ORAL ONCE AS NEEDED
Status: DISCONTINUED | OUTPATIENT
Start: 2024-07-13 | End: 2024-07-15 | Stop reason: HOSPADM

## 2024-07-13 RX ORDER — DIPHENHYDRAMINE HYDROCHLORIDE 50 MG/ML
25 INJECTION INTRAMUSCULAR; INTRAVENOUS EVERY 4 HOURS PRN
Status: DISCONTINUED | OUTPATIENT
Start: 2024-07-13 | End: 2024-07-15 | Stop reason: HOSPADM

## 2024-07-13 RX ORDER — HYDROCODONE BITARTRATE AND ACETAMINOPHEN 7.5; 325 MG/1; MG/1
1 TABLET ORAL EVERY 4 HOURS PRN
Status: DISCONTINUED | OUTPATIENT
Start: 2024-07-13 | End: 2024-07-15 | Stop reason: HOSPADM

## 2024-07-13 RX ORDER — DOCUSATE SODIUM 100 MG/1
200 CAPSULE, LIQUID FILLED ORAL 2 TIMES DAILY PRN
Status: DISCONTINUED | OUTPATIENT
Start: 2024-07-13 | End: 2024-07-15 | Stop reason: HOSPADM

## 2024-07-13 RX ORDER — OXYTOCIN-SODIUM CHLORIDE 0.9% IV SOLN 30 UNIT/500ML 30-0.9/5 UT/ML-%
10 SOLUTION INTRAVENOUS ONCE
Status: DISCONTINUED | OUTPATIENT
Start: 2024-07-13 | End: 2024-07-13

## 2024-07-13 RX ORDER — METHYLERGONOVINE MALEATE 0.2 MG/ML
200 INJECTION INTRAVENOUS ONCE AS NEEDED
Status: DISCONTINUED | OUTPATIENT
Start: 2024-07-13 | End: 2024-07-15 | Stop reason: HOSPADM

## 2024-07-13 RX ORDER — OXYTOCIN-SODIUM CHLORIDE 0.9% IV SOLN 30 UNIT/500ML 30-0.9/5 UT/ML-%
95 SOLUTION INTRAVENOUS ONCE
Status: DISCONTINUED | OUTPATIENT
Start: 2024-07-13 | End: 2024-07-15 | Stop reason: HOSPADM

## 2024-07-13 RX ORDER — METHYLERGONOVINE MALEATE 0.2 MG/ML
200 INJECTION INTRAVENOUS ONCE AS NEEDED
Status: DISCONTINUED | OUTPATIENT
Start: 2024-07-13 | End: 2024-07-13

## 2024-07-13 RX ORDER — LIDOCAINE HYDROCHLORIDE 10 MG/ML
10 INJECTION INFILTRATION; PERINEURAL ONCE AS NEEDED
Status: DISCONTINUED | OUTPATIENT
Start: 2024-07-13 | End: 2024-07-13

## 2024-07-13 RX ORDER — TRANEXAMIC ACID 10 MG/ML
1000 INJECTION, SOLUTION INTRAVENOUS EVERY 30 MIN PRN
Status: DISCONTINUED | OUTPATIENT
Start: 2024-07-13 | End: 2024-07-13

## 2024-07-13 RX ORDER — ONDANSETRON 8 MG/1
8 TABLET, ORALLY DISINTEGRATING ORAL EVERY 8 HOURS PRN
Status: DISCONTINUED | OUTPATIENT
Start: 2024-07-13 | End: 2024-07-15 | Stop reason: HOSPADM

## 2024-07-13 RX ORDER — MISOPROSTOL 200 UG/1
800 TABLET ORAL ONCE AS NEEDED
Status: DISCONTINUED | OUTPATIENT
Start: 2024-07-13 | End: 2024-07-13

## 2024-07-13 RX ORDER — OXYTOCIN-SODIUM CHLORIDE 0.9% IV SOLN 30 UNIT/500ML 30-0.9/5 UT/ML-%
95 SOLUTION INTRAVENOUS ONCE AS NEEDED
Status: DISCONTINUED | OUTPATIENT
Start: 2024-07-13 | End: 2024-07-15 | Stop reason: HOSPADM

## 2024-07-13 RX ORDER — OXYTOCIN-SODIUM CHLORIDE 0.9% IV SOLN 30 UNIT/500ML 30-0.9/5 UT/ML-%
95 SOLUTION INTRAVENOUS ONCE
Status: DISCONTINUED | OUTPATIENT
Start: 2024-07-13 | End: 2024-07-13

## 2024-07-13 RX ORDER — SODIUM CHLORIDE, SODIUM LACTATE, POTASSIUM CHLORIDE, CALCIUM CHLORIDE 600; 310; 30; 20 MG/100ML; MG/100ML; MG/100ML; MG/100ML
INJECTION, SOLUTION INTRAVENOUS CONTINUOUS
Status: DISCONTINUED | OUTPATIENT
Start: 2024-07-13 | End: 2024-07-13

## 2024-07-13 RX ORDER — DIPHENOXYLATE HYDROCHLORIDE AND ATROPINE SULFATE 2.5; .025 MG/1; MG/1
2 TABLET ORAL EVERY 6 HOURS PRN
Status: DISCONTINUED | OUTPATIENT
Start: 2024-07-13 | End: 2024-07-15 | Stop reason: HOSPADM

## 2024-07-13 RX ORDER — OXYTOCIN-SODIUM CHLORIDE 0.9% IV SOLN 30 UNIT/500ML 30-0.9/5 UT/ML-%
95 SOLUTION INTRAVENOUS ONCE AS NEEDED
Status: DISCONTINUED | OUTPATIENT
Start: 2024-07-13 | End: 2024-07-13

## 2024-07-13 RX ORDER — HYDROCORTISONE 25 MG/G
CREAM TOPICAL 3 TIMES DAILY PRN
Status: DISCONTINUED | OUTPATIENT
Start: 2024-07-13 | End: 2024-07-15 | Stop reason: HOSPADM

## 2024-07-13 RX ORDER — CARBOPROST TROMETHAMINE 250 UG/ML
250 INJECTION, SOLUTION INTRAMUSCULAR
Status: DISCONTINUED | OUTPATIENT
Start: 2024-07-13 | End: 2024-07-15 | Stop reason: HOSPADM

## 2024-07-13 RX ORDER — SIMETHICONE 80 MG
1 TABLET,CHEWABLE ORAL EVERY 6 HOURS PRN
Status: DISCONTINUED | OUTPATIENT
Start: 2024-07-13 | End: 2024-07-15 | Stop reason: HOSPADM

## 2024-07-13 RX ORDER — ACETAMINOPHEN 325 MG/1
650 TABLET ORAL EVERY 6 HOURS PRN
Status: DISCONTINUED | OUTPATIENT
Start: 2024-07-13 | End: 2024-07-15 | Stop reason: HOSPADM

## 2024-07-13 RX ORDER — CARBOPROST TROMETHAMINE 250 UG/ML
250 INJECTION, SOLUTION INTRAMUSCULAR
Status: DISCONTINUED | OUTPATIENT
Start: 2024-07-13 | End: 2024-07-13

## 2024-07-13 RX ORDER — IBUPROFEN 600 MG/1
600 TABLET ORAL EVERY 6 HOURS
Status: DISCONTINUED | OUTPATIENT
Start: 2024-07-14 | End: 2024-07-15 | Stop reason: HOSPADM

## 2024-07-13 RX ORDER — OXYTOCIN 10 [USP'U]/ML
10 INJECTION, SOLUTION INTRAMUSCULAR; INTRAVENOUS ONCE AS NEEDED
Status: DISCONTINUED | OUTPATIENT
Start: 2024-07-13 | End: 2024-07-15 | Stop reason: HOSPADM

## 2024-07-13 RX ORDER — DIPHENHYDRAMINE HCL 25 MG
25 CAPSULE ORAL EVERY 4 HOURS PRN
Status: DISCONTINUED | OUTPATIENT
Start: 2024-07-13 | End: 2024-07-15 | Stop reason: HOSPADM

## 2024-07-13 RX ORDER — CALCIUM CARBONATE 200(500)MG
500 TABLET,CHEWABLE ORAL 3 TIMES DAILY PRN
Status: DISCONTINUED | OUTPATIENT
Start: 2024-07-13 | End: 2024-07-13

## 2024-07-13 RX ORDER — SIMETHICONE 80 MG
1 TABLET,CHEWABLE ORAL 4 TIMES DAILY PRN
Status: DISCONTINUED | OUTPATIENT
Start: 2024-07-13 | End: 2024-07-13

## 2024-07-13 RX ORDER — SODIUM CHLORIDE 9 MG/ML
INJECTION, SOLUTION INTRAVENOUS
Status: DISCONTINUED | OUTPATIENT
Start: 2024-07-13 | End: 2024-07-13

## 2024-07-13 RX ORDER — OXYTOCIN-SODIUM CHLORIDE 0.9% IV SOLN 30 UNIT/500ML 30-0.9/5 UT/ML-%
10 SOLUTION INTRAVENOUS ONCE AS NEEDED
Status: DISCONTINUED | OUTPATIENT
Start: 2024-07-13 | End: 2024-07-15 | Stop reason: HOSPADM

## 2024-07-13 RX ORDER — ONDANSETRON 8 MG/1
8 TABLET, ORALLY DISINTEGRATING ORAL EVERY 8 HOURS PRN
Status: DISCONTINUED | OUTPATIENT
Start: 2024-07-13 | End: 2024-07-13

## 2024-07-13 RX ORDER — SODIUM CHLORIDE 0.9 % (FLUSH) 0.9 %
10 SYRINGE (ML) INJECTION
Status: DISCONTINUED | OUTPATIENT
Start: 2024-07-13 | End: 2024-07-15 | Stop reason: HOSPADM

## 2024-07-13 RX ORDER — OXYTOCIN-SODIUM CHLORIDE 0.9% IV SOLN 30 UNIT/500ML 30-0.9/5 UT/ML-%
10 SOLUTION INTRAVENOUS ONCE AS NEEDED
Status: DISCONTINUED | OUTPATIENT
Start: 2024-07-13 | End: 2024-07-13

## 2024-07-13 RX ORDER — OXYTOCIN 10 [USP'U]/ML
10 INJECTION, SOLUTION INTRAMUSCULAR; INTRAVENOUS ONCE AS NEEDED
Status: DISCONTINUED | OUTPATIENT
Start: 2024-07-13 | End: 2024-07-13

## 2024-07-13 RX ORDER — OXYTOCIN-SODIUM CHLORIDE 0.9% IV SOLN 30 UNIT/500ML 30-0.9/5 UT/ML-%
0-32 SOLUTION INTRAVENOUS CONTINUOUS
Status: DISCONTINUED | OUTPATIENT
Start: 2024-07-13 | End: 2024-07-13

## 2024-07-13 RX ORDER — HYDROCODONE BITARTRATE AND ACETAMINOPHEN 5; 325 MG/1; MG/1
1 TABLET ORAL EVERY 4 HOURS PRN
Status: DISCONTINUED | OUTPATIENT
Start: 2024-07-13 | End: 2024-07-15 | Stop reason: HOSPADM

## 2024-07-13 RX ORDER — TRANEXAMIC ACID 10 MG/ML
1000 INJECTION, SOLUTION INTRAVENOUS EVERY 30 MIN PRN
Status: DISCONTINUED | OUTPATIENT
Start: 2024-07-13 | End: 2024-07-15 | Stop reason: HOSPADM

## 2024-07-13 RX ADMIN — Medication 4 MILLI-UNITS/MIN: at 02:07

## 2024-07-13 RX ADMIN — SODIUM CHLORIDE, POTASSIUM CHLORIDE, SODIUM LACTATE AND CALCIUM CHLORIDE: 600; 310; 30; 20 INJECTION, SOLUTION INTRAVENOUS at 02:07

## 2024-07-13 NOTE — INTERVAL H&P NOTE
The patient has been examined and the H&P has been reviewed:    Pt presents day before IOL c/o vaginal pressure. VE 3/60/-3 vertex, Offered to start induction today, pt in agreement, pitocin reviewed. Pt desires NCB.        Active Hospital Problems    Diagnosis  POA    *Antepartum multigravida of advanced maternal age [O09.529]  Yes     Offer screening options.  Encouraged daily baby aspirin at 16 weeks.  Ultrasound at 32 and 36 weeks      Obesity affecting pregnancy, antepartum [O99.210]  Yes     8/25/22 BMI-41.53  Lovenox PP      Mild intermittent asthma without complication [J45.20]  Yes     Last Assessment & Plan:   Formatting of this note might be different from the original.  -Remains well controlled with symbicort and albuterol. No recent exacerbations   -Continue medications      Generalized anxiety disorder [F41.1]  Yes     Formatting of this note might be different from the original.   NATE-7 score 9/29/2021: 18      Last Assessment & Plan:    Formatting of this note might be different from the original.   Continue BuSpar 7.5 daily.        Resolved Hospital Problems   No resolved problems to display.

## 2024-07-13 NOTE — PROGRESS NOTES
S: Doing well with  at bedside  O:  VS reviewed, afebrile   Vitals:    07/13/24 1601 07/13/24 1602 07/13/24 1606 07/13/24 1611   BP:  121/67     Pulse: 67 62 63 67   Resp:       Temp:       TempSrc:       SpO2: 96%  98% 98%   Weight:       Height:           FHTs 145bpm with moderate variability and accelerations, no decelerations, cat 1, reassuring  UC q3-4min  SVE 3/60/-3, vertex    A: IUP @ 39w4d ;     Patient Active Problem List   Diagnosis    Grand multiparity, with current pregnancy, antepartum    Obesity affecting pregnancy, antepartum    Antepartum multigravida of advanced maternal age    Mild intermittent asthma without complication    Generalized anxiety disorder    Hypertension, essential       P:   Continue close monitoring of maternal/fetal status  Continue pitocin per protocol   Anticipate progress and vaginal delivery

## 2024-07-14 PROCEDURE — 99231 SBSQ HOSP IP/OBS SF/LOW 25: CPT | Mod: UC,,, | Performed by: ADVANCED PRACTICE MIDWIFE

## 2024-07-14 PROCEDURE — 63600175 PHARM REV CODE 636 W HCPCS: Performed by: OBSTETRICS & GYNECOLOGY

## 2024-07-14 PROCEDURE — 11000001 HC ACUTE MED/SURG PRIVATE ROOM

## 2024-07-14 PROCEDURE — 25000003 PHARM REV CODE 250: Performed by: ADVANCED PRACTICE MIDWIFE

## 2024-07-14 RX ORDER — ENOXAPARIN SODIUM 100 MG/ML
40 INJECTION SUBCUTANEOUS EVERY 12 HOURS
Status: DISCONTINUED | OUTPATIENT
Start: 2024-07-14 | End: 2024-07-15 | Stop reason: HOSPADM

## 2024-07-14 RX ORDER — ENOXAPARIN SODIUM 100 MG/ML
30 INJECTION SUBCUTANEOUS EVERY 24 HOURS
Status: DISCONTINUED | OUTPATIENT
Start: 2024-07-14 | End: 2024-07-14 | Stop reason: DRUGHIGH

## 2024-07-14 RX ADMIN — PRENATAL VITAMINS-IRON FUMARATE 27 MG IRON-FOLIC ACID 0.8 MG TABLET 1 TABLET: at 08:07

## 2024-07-14 RX ADMIN — ENOXAPARIN SODIUM 40 MG: 40 INJECTION SUBCUTANEOUS at 08:07

## 2024-07-14 RX ADMIN — ENOXAPARIN SODIUM 40 MG: 40 INJECTION SUBCUTANEOUS at 09:07

## 2024-07-14 NOTE — L&D DELIVERY NOTE
[facility]  Vaginal Delivery   Operative Note    SUMMARY     Normal spontaneous vaginal delivery of live infant, was placed on mothers abdomen for skin to skin and bulb suctioning performed.  Infant delivered position OA over intact perineum.  Nuchal cord: Yes, cord reduced following delivery.    Spontaneous delivery of placenta and IV pitocin given noting good uterine tone.  No lacerations noted.  Patient tolerated delivery well. Sponge needle and lap counted correctly x2.    Indications: NVD (normal vaginal delivery)  Pregnancy complicated by:   Patient Active Problem List   Diagnosis    Grand multiparity, with current pregnancy, antepartum    Obesity affecting pregnancy, antepartum    Antepartum multigravida of advanced maternal age    Mild intermittent asthma without complication    NVD (normal vaginal delivery)    Generalized anxiety disorder    Hypertension, essential    Single liveborn infant delivered vaginally     Admitting GA: 39w4d    Delivery Information for Federico Malik    Birth information:  YOB: 2026   Time of birth: 6:45 PM   Sex: male   Head Delivery Date/Time: 2024  6:45 PM   Delivery type: Vaginal, Spontaneous   Gestational Age: 39w4d        Delivery Providers    Delivering clinician: Geremias Ricardo CNM   Provider Role    Piedad Villatoro RN Registered Nurse    Mercedez Leal RN Registered Nurse              Measurements    Weight:   Length:          Apgars    Living status: Living  Apgar Component Scores:  1 min.:  5 min.:  10 min.:  15 min.:  20 min.:    Skin color:  0  1       Heart rate:  2  2       Reflex irritability:  2  2       Muscle tone:  2  2       Respiratory effort:  2  2       Total:  8  9       Apgars assigned by: MERCEDEZ LEAL RN         Operative Delivery    Forceps attempted?: No  Vacuum extractor attempted?: No         Shoulder Dystocia    Shoulder dystocia present?: No           Presentation    Presentation: Vertex  Position: Middle  Occiput Anterior           Interventions/Resuscitation    Method: Bulb Suctioning, Tactile Stimulation       Cord    Vessels: 3 vessels  Complications: Nuchal  Nuchal Intervention: reduced  Nuchal Cord Description: loose nuchal cord  Number of Loops: 1  Delayed Cord Clamping?: Yes  Cord Clamped Date/Time: 2024  6:49 PM  Cord Blood Disposition: Discarded  Gases Sent?: No  Stem Cell Collection (by MD): No       Placenta    Placenta delivery date/time: 2024 1850  Placenta removal: Spontaneous  Placenta appearance: Intact  Placenta disposition: Discarded           Labor Events:       labor: No     Labor Onset Date/Time:         Dilation Complete Date/Time:         Start Pushing Date/Time:         Start Pushing Date/Time:       Rupture Date/Time: 24  162         Rupture type: ARM (Artificial Rupture)         Fluid Amount:       Fluid Color: Clear               steroids: None     Antibiotics given for GBS: No     Induction: none     Indications for induction:        Augmentation: oxytocin     Indications for augmentation: Ineffective Contraction Pattern     Labor complications: None     Additional complications:          Cervical ripening:                     Delivery:      Episiotomy: None     Indication for Episiotomy:       Perineal Lacerations: None Repaired:      Periurethral Laceration:   Repaired:     Labial Laceration:   Repaired:     Sulcus Laceration:   Repaired:     Vaginal Laceration:   Repaired:     Cervical Laceration:   Repaired:     Repair suture: None     Repair # of packets:       Last Value - EBL - Nursing (mL):       Sum - EBL - Nursing (mL): 0     Last Value - EBL - Anesthesia (mL):      Calculated QBL (mL):       Running total QBL (mL):       Vaginal Sweep Performed: No     Surgicount Correct:       Vaginal Packing: No Quantity:       Other providers:       Anesthesia    Method: None          Details (if applicable):  Trial of Labor       Categorization:      Priority:     Indications for :     Incision Type:       Additional  information:  Forceps:    Vacuum:    Breech:    Observed anomalies    Other (Comments):

## 2024-07-14 NOTE — PROGRESS NOTES
[facility] ([facility])  Obstetrics  Postpartum Progress Note    Patient Name: Pranay Malik  MRN: [id number]  Admission Date: 7/13/2024  Hospital Length of Stay: 1 days  Attending Physician: Nasreen Ragland MD  Primary Care Provider: Cindy, Primary Doctor    Subjective:     Principal Problem:NVD (normal vaginal delivery)    Hospital Course:  7/14/24:PPD1, doing really well this morning. Routine PP care    Interval History:     She is doing well this morning. She is tolerating a regular diet without nausea or vomiting. She is voiding spontaneously. She is ambulating. She has passed flatus, and has not a BM. Vaginal bleeding is mild. She denies fever or chills. Abdominal pain is mild and controlled with oral medications. She Is not breastfeeding. She desires circumcision for her male baby: yes.    Objective:     Vital Signs (Most Recent):  Temp: 98.2 °F (36.8 °C) (07/14/24 0750)  Pulse: (!) 52 (07/14/24 0750)  Resp: 18 (07/14/24 0750)  BP: (!) 150/90 (07/14/24 0750)  SpO2: 96 % (07/14/24 0424) Vital Signs (24h Range):  Temp:  [97.8 °F (36.6 °C)-98.5 °F (36.9 °C)] 98.2 °F (36.8 °C)  Pulse:  [52-80] 52  Resp:  [18] 18  SpO2:  [92 %-100 %] 96 %  BP: (108-150)/(56-95) 150/90     Weight: 135.6 kg (299 lb)  Body mass index is 44.15 kg/m².      Intake/Output Summary (Last 24 hours) at 7/14/2024 0831  Last data filed at 7/13/2024 2305  Gross per 24 hour   Intake 7.33 ml   Output 200 ml   Net -192.67 ml         Significant Labs:  Lab Results   Component Value Date    GROUPTRH A POS 07/13/2024    HEPBSAG Non-reactive 01/30/2024    STREPBCULT No Group B Streptococcus isolated 06/19/2024     Recent Labs   Lab 07/13/24  1359   HGB 13.1   HCT 39.6       I have personallly reviewed all pertinent lab results from the last 24 hours.    Physical Exam:   Constitutional: She is oriented to person, place, and time. She appears well-developed and well-nourished.    HENT:   Head: Normocephalic.      Cardiovascular:  Normal  rate and regular rhythm.             Pulmonary/Chest: Effort normal.        Abdominal: Soft.     Genitourinary:    Uterus normal.             Musculoskeletal: Normal range of motion and moves all extremeties.       Neurological: She is alert and oriented to person, place, and time. She has normal reflexes.    Skin: Skin is warm and dry.        Review of Systems  Assessment/Plan:     39 y.o. female  for:    * NVD (normal vaginal delivery)  Routine postpartum care    Single liveborn infant delivered vaginally  Viable male infant in care of peds    Obesity affecting pregnancy, antepartum  Lovenox ordered        Disposition: As patient meets milestones, will plan to discharge tomorrow.    Airam Dillon CNM  Obstetrics  [facility] ([facility])

## 2024-07-14 NOTE — HOSPITAL COURSE
7/14/24:PPD1, doing really well this morning. Routine PP care  7/15/24 PPD2 Doing well, no complaints. Ready for discharge. Reviewed PP warning signs and PP pre-E s/s. BP check in clinic Thursday.

## 2024-07-14 NOTE — PROGRESS NOTES
Pharmacist Renal Dose Adjustment Note    Pranay Malik is a 39 y.o. female being treated with the medication enoxaparin.     Patient Data:    Vital Signs (Most Recent):  Temp: 98.3 °F (36.8 °C) (07/14/24 0424)  Pulse: (!) 55 (07/14/24 0424)  Resp: 18 (07/14/24 0424)  BP: 135/74 (07/14/24 0424)  SpO2: 96 % (07/14/24 0424) Vital Signs (72h Range):  Temp:  [97.8 °F (36.6 °C)-98.5 °F (36.9 °C)]   Pulse:  [55-80]   Resp:  [18]   BP: (108-150)/(56-95)   SpO2:  [92 %-100 %]      Recent Labs   Lab 07/13/24  1359   CREATININE 0.7     Serum creatinine: 0.7 mg/dL 07/13/24 1359  Estimated creatinine clearance: 156.9 mL/min    Medication: enoxaparin 30 mg SQ daily will be changed to enoxaparin 40 mg SQ BID per pharmacy renal dose adjustment protocol for patients with CrCl greater than 30 mL/min and BMI greater than 40 kg/m².    Body mass index is 44.15 kg/m².    Pharmacist's Name: Anusha Castañeda PharmD  Pharmacist's Extension: [phone]     Thank you for allowing us to participate in this patient's care.     Ansuha Castañeda PharmD 07/14/2024 7:19 AM

## 2024-07-14 NOTE — SUBJECTIVE & OBJECTIVE
Interval History:     She is doing well this morning. She is tolerating a regular diet without nausea or vomiting. She is voiding spontaneously. She is ambulating. She has passed flatus, and has not a BM. Vaginal bleeding is mild. She denies fever or chills. Abdominal pain is mild and controlled with oral medications. She Is not breastfeeding. She desires circumcision for her male baby: yes.    Objective:     Vital Signs (Most Recent):  Temp: 98.2 °F (36.8 °C) (07/14/24 0750)  Pulse: (!) 52 (07/14/24 0750)  Resp: 18 (07/14/24 0750)  BP: (!) 150/90 (07/14/24 0750)  SpO2: 96 % (07/14/24 0424) Vital Signs (24h Range):  Temp:  [97.8 °F (36.6 °C)-98.5 °F (36.9 °C)] 98.2 °F (36.8 °C)  Pulse:  [52-80] 52  Resp:  [18] 18  SpO2:  [92 %-100 %] 96 %  BP: (108-150)/(56-95) 150/90     Weight: 135.6 kg (299 lb)  Body mass index is 44.15 kg/m².      Intake/Output Summary (Last 24 hours) at 7/14/2024 0831  Last data filed at 7/13/2024 2305  Gross per 24 hour   Intake 7.33 ml   Output 200 ml   Net -192.67 ml         Significant Labs:  Lab Results   Component Value Date    GROUPTRH A POS 07/13/2024    HEPBSAG Non-reactive 01/30/2024    STREPBCULT No Group B Streptococcus isolated 06/19/2024     Recent Labs   Lab 07/13/24  1359   HGB 13.1   HCT 39.6       I have personallly reviewed all pertinent lab results from the last 24 hours.    Physical Exam:   Constitutional: She is oriented to person, place, and time. She appears well-developed and well-nourished.    HENT:   Head: Normocephalic.      Cardiovascular:  Normal rate and regular rhythm.             Pulmonary/Chest: Effort normal.        Abdominal: Soft.     Genitourinary:    Uterus normal.             Musculoskeletal: Normal range of motion and moves all extremeties.       Neurological: She is alert and oriented to person, place, and time. She has normal reflexes.    Skin: Skin is warm and dry.        Review of Systems

## 2024-07-15 VITALS
DIASTOLIC BLOOD PRESSURE: 89 MMHG | SYSTOLIC BLOOD PRESSURE: 143 MMHG | TEMPERATURE: 98 F | OXYGEN SATURATION: 93 % | HEIGHT: 69 IN | WEIGHT: 293 LBS | RESPIRATION RATE: 18 BRPM | BODY MASS INDEX: 43.4 KG/M2 | HEART RATE: 56 BPM

## 2024-07-15 PROBLEM — O09.529 ANTEPARTUM MULTIGRAVIDA OF ADVANCED MATERNAL AGE: Status: RESOLVED | Noted: 2021-12-29 | Resolved: 2024-07-15

## 2024-07-15 PROBLEM — O09.40 GRAND MULTIPARITY, WITH CURRENT PREGNANCY, ANTEPARTUM: Status: RESOLVED | Noted: 2017-08-14 | Resolved: 2024-07-15

## 2024-07-15 PROCEDURE — 63600175 PHARM REV CODE 636 W HCPCS: Performed by: OBSTETRICS & GYNECOLOGY

## 2024-07-15 PROCEDURE — 25000003 PHARM REV CODE 250: Performed by: ADVANCED PRACTICE MIDWIFE

## 2024-07-15 PROCEDURE — 99238 HOSP IP/OBS DSCHRG MGMT 30/<: CPT | Mod: UC,,, | Performed by: MIDWIFE

## 2024-07-15 RX ORDER — IBUPROFEN 600 MG/1
600 TABLET ORAL EVERY 8 HOURS PRN
Qty: 30 TABLET | Refills: 0 | Status: SHIPPED | OUTPATIENT
Start: 2024-07-15

## 2024-07-15 RX ADMIN — PRENATAL VITAMINS-IRON FUMARATE 27 MG IRON-FOLIC ACID 0.8 MG TABLET 1 TABLET: at 08:07

## 2024-07-15 RX ADMIN — IBUPROFEN 600 MG: 600 TABLET, FILM COATED ORAL at 11:07

## 2024-07-15 RX ADMIN — ENOXAPARIN SODIUM 40 MG: 40 INJECTION SUBCUTANEOUS at 08:07

## 2024-07-15 NOTE — SUBJECTIVE & OBJECTIVE
Interval History: PPD2    She is doing well this morning. She is tolerating a regular diet without nausea or vomiting. She is voiding spontaneously. She is ambulating. She has passed flatus, and has a BM. Vaginal bleeding is mild. She denies fever or chills. Abdominal pain is mild and controlled with oral medications. She Is not breastfeeding. She desires circumcision for her male baby: yes. Plans OCP's for contraception PP.     Objective:     Vital Signs (Most Recent):  Temp: 97.9 °F (36.6 °C) (07/15/24 0831)  Pulse: (!) 58 (07/15/24 0831)  Resp: 18 (07/15/24 0831)  BP: 137/80 (07/15/24 0831)  SpO2: (!) 93 % (07/15/24 0413) Vital Signs (24h Range):  Temp:  [97.7 °F (36.5 °C)-98.9 °F (37.2 °C)] 97.9 °F (36.6 °C)  Pulse:  [51-63] 58  Resp:  [18] 18  SpO2:  [93 %-96 %] 93 %  BP: (112-163)/(68-87) 137/80     Weight: 135.6 kg (299 lb)  Body mass index is 44.15 kg/m².      Intake/Output Summary (Last 24 hours) at 7/15/2024 0847  Last data filed at 7/14/2024 1449  Gross per 24 hour   Intake --   Output 300 ml   Net -300 ml         Significant Labs:  Lab Results   Component Value Date    GROUPTRH A POS 07/13/2024    HEPBSAG Non-reactive 01/30/2024    STREPBCULT No Group B Streptococcus isolated 06/19/2024     Recent Labs   Lab 07/13/24  1359   HGB 13.1   HCT 39.6       CBC:   Recent Labs   Lab 07/13/24  1359   WBC 4.59   RBC 4.02   HGB 13.1   HCT 39.6      MCV 99*   MCH 32.6*   MCHC 33.1     CMP:   Recent Labs   Lab 07/13/24  1359   GLU 98   CALCIUM 8.7   ALBUMIN 2.8*   PROT 7.3      K 3.7   CO2 23      BUN 6   CREATININE 0.7   ALKPHOS 225*   ALT 10   AST 23   BILITOT 0.3     HIV:   Recent Labs   Lab 07/13/24  1359   [id number] Negative       Physical Exam:   Constitutional: She is oriented to person, place, and time. She appears well-developed and well-nourished.    HENT:   Head: Normocephalic and atraumatic.    Eyes: Conjunctivae and EOM are normal.     Cardiovascular:  Normal rate, regular rhythm and  normal heart sounds.      Exam reveals edema (1+ pitting, has compression hose on).        Pulmonary/Chest: Effort normal and breath sounds normal. No respiratory distress.        Abdominal: Soft. Bowel sounds are normal. She exhibits no distension. There is no abdominal tenderness.     Genitourinary:    Vagina and uterus normal.             Musculoskeletal: Normal range of motion and moves all extremeties.       Neurological: She is alert and oriented to person, place, and time.    Skin: Skin is warm and dry.    Psychiatric: She has a normal mood and affect. Her behavior is normal. Judgment and thought content normal.       Review of Systems

## 2024-07-15 NOTE — DISCHARGE SUMMARY
[facility] ([facility])  Obstetrics  Discharge Summary      Patient Name: Pranay Malik  MRN: [id number]  Admission Date: 7/13/2024  Hospital Length of Stay: 2 days  Discharge Date and Time:  07/15/2024 8:53 AM  Attending Physician: Nasreen Ragland MD   Discharging Provider: Eugenia Escobar CNM   Primary Care Provider: Cindy, Primary Doctor    HPI: No notes on file        * No surgery found *     Hospital Course:   7/14/24:PPD1, doing really well this morning. Routine PP care  7/15/24 PPD2 Doing well, no complaints. Ready for discharge. Reviewed PP warning signs and PP pre-E s/s. BP check in clinic Thursday.         Final Active Diagnoses:    Diagnosis Date Noted POA    PRINCIPAL PROBLEM:  NVD (normal vaginal delivery) [O80] 03/30/2023 Not Applicable    Single liveborn infant delivered vaginally [Z38.00] 07/13/2024 No    Obesity affecting pregnancy, antepartum [O99.210] 12/29/2021 Yes    Mild intermittent asthma without complication [J45.20] 10/14/2020 Yes    Generalized anxiety disorder [F41.1] 10/14/2020 Yes    Hypertension, essential [I10] 10/29/2019 Yes      Problems Resolved During this Admission:    Diagnosis Date Noted Date Resolved POA    Antepartum multigravida of advanced maternal age [O09.529] 12/29/2021 07/15/2024 Yes        Significant Diagnostic Studies: Labs: CMP   Recent Labs   Lab 07/13/24  1359      K 3.7      CO2 23   GLU 98   BUN 6   CREATININE 0.7   CALCIUM 8.7   PROT 7.3   ALBUMIN 2.8*   BILITOT 0.3   ALKPHOS 225*   AST 23   ALT 10   ANIONGAP 8    and CBC   Recent Labs   Lab 07/13/24  1359   WBC 4.59   HGB 13.1   HCT 39.6            Feeding Method: bottle    Immunizations       Date Immunization Status Dose Route/Site Given by    07/13/24 2132 MMR Incomplete 0.5 mL Subcutaneous/     07/13/24 2132 Tdap Incomplete 0.5 mL Intramuscular/             Delivery:    Episiotomy: None   Lacerations: None   Repair suture: None   Repair # of packets:     Blood loss  "(ml):       Birth information:  YOB: 2026   Time of birth: 6:45 PM   Sex: male   Delivery type: Vaginal, Spontaneous   Gestational Age: 39w4d     Measurements    Weight: 3790 g  Weight (lbs): 8 lb 5.7 oz  Length: 50.8 cm  Length (in): 20"  Head circumference: 35.6 cm  Chest circumference: 36.8 cm  Abdominal girth: 33 cm         Delivery Clinician: Delivery Providers    Delivering clinician: Geremias Ricardo CNM   Provider Role    Piedad Villatoro RN Registered Nurse    Mercedez Leal RN Registered Nurse    Kirsten Welsh, ST Surgical Tech             Additional  information:  Forceps:    Vacuum:    Breech:    Observed anomalies      Living?:     Apgars    Living status: Living  Apgar Component Scores:  1 min.:  5 min.:  10 min.:  15 min.:  20 min.:    Skin color:  0  1       Heart rate:  2  2       Reflex irritability:  2  2       Muscle tone:  2  2       Respiratory effort:  2  2       Total:  8  9       Apgars assigned by: MERCEDEZ LEAL RN         Placenta: Delivered:       appearance  Pending Diagnostic Studies:       None            Discharged Condition: good    Disposition: Home or Self Care    Follow Up:   Follow-up Information       Candy Andujar CNM Follow up on 2024.    Specialty: Obstetrics and Gynecology  Contact information:  [address] [place] [address] [address]ashaad BORRERO [address] [phone]                           Patient Instructions:   No discharge procedures on file.  Medications:  Current Discharge Medication List        CONTINUE these medications which have CHANGED    Details   ibuprofen (ADVIL,MOTRIN) 600 MG tablet Take 1 tablet (600 mg total) by mouth every 8 (eight) hours as needed for Pain.  Qty: 30 tablet, Refills: 0           CONTINUE these medications which have NOT CHANGED    Details   albuterol (PROVENTIL/VENTOLIN HFA) 90 mcg/actuation inhaler Inhale 2 puffs into the lungs every 6 (six) hours as needed.      PNV,calcium 72-iron-folic acid (PRENATAL VITAMIN PLUS LOW " IRON) 27 mg iron- 1 mg Tab Take 1 tablet (1 each total) by mouth once daily.  Qty: 30 tablet, Refills: 11      SYMBICORT 80-4.5 mcg/actuation HFAA Inhale 1 puff into the lungs once daily.             Eugenia Escobar CNM  Obstetrics  [facility] ([facility])

## 2024-07-15 NOTE — PLAN OF CARE
Problem: Adult Inpatient Plan of Care  Goal: Plan of Care Review  Outcome: Progressing  Goal: Patient-Specific Goal (Individualized)  Outcome: Progressing  Goal: Absence of Hospital-Acquired Illness or Injury  Outcome: Progressing  Goal: Optimal Comfort and Wellbeing  Outcome: Progressing  Goal: Readiness for Transition of Care  Outcome: Progressing     Problem:  Fall Injury Risk  Goal: Absence of Fall, Infant Drop and Related Injury  Outcome: Progressing     Problem: Infection  Goal: Absence of Infection Signs and Symptoms  Outcome: Progressing     Problem: Labor  Goal: Hemostasis  Outcome: Progressing  Goal: Stable Fetal Wellbeing  Outcome: Progressing  Goal: Effective Progression to Delivery  Outcome: Progressing  Goal: Absence of Infection Signs and Symptoms  Outcome: Progressing  Goal: Acceptable Pain Control  Outcome: Progressing  Goal: Normal Uterine Contraction Pattern  Outcome: Progressing     Problem: Bariatric Environmental Safety  Goal: Safety Maintained with Care  Outcome: Progressing     
D/c instructions taught to mom. Mom verbalized understanding. No c/o pain. Mom and baby bracelets matched. Mom signed identification sheet.    
Patient afebrile and had no falls this shift. Fundus firm without massage and below umbilicus. Bleeding light, no clots passed this shift. Voids spontaneously. Ambulates independently. No complaints of pain presently. No oral pain medication given as per patient's request. Vital signs stable at this time. Bonding well with infant; responds to infant cues and participates in infant care. Will continue to monitor.      Problem: Adult Inpatient Plan of Care  Goal: Plan of Care Review  Outcome: Progressing  Goal: Patient-Specific Goal (Individualized)  Outcome: Progressing  Goal: Absence of Hospital-Acquired Illness or Injury  Outcome: Progressing  Goal: Optimal Comfort and Wellbeing  Outcome: Progressing  Goal: Readiness for Transition of Care  Outcome: Progressing     Problem:  Fall Injury Risk  Goal: Absence of Fall, Infant Drop and Related Injury  Outcome: Progressing     Problem: Infection  Goal: Absence of Infection Signs and Symptoms  Outcome: Progressing     Problem: Bariatric Environmental Safety  Goal: Safety Maintained with Care  Outcome: Progressing     Problem: Postpartum (Vaginal Delivery)  Goal: Successful Parent Role Transition  Outcome: Progressing  Goal: Hemostasis  Outcome: Progressing  Goal: Absence of Infection Signs and Symptoms  Outcome: Progressing  Goal: Anesthesia/Sedation Recovery  Outcome: Progressing  Goal: Optimal Pain Control and Function  Outcome: Progressing  Goal: Effective Urinary Elimination  Outcome: Progressing     
Patient afebrile and had no falls this shift. Fundus firm without massage and below umbilicus. Bleeding light, no clots passed this shift. Voids spontaneously. Ambulates independently. Pain well controlled with oral pain medication. Vital signs stable at this time. Bonding well with infant; responds to infant cues and participates in infant care.       Problem: Adult Inpatient Plan of Care  Goal: Plan of Care Review  Outcome: Progressing  Goal: Patient-Specific Goal (Individualized)  Outcome: Progressing  Goal: Absence of Hospital-Acquired Illness or Injury  Outcome: Progressing  Goal: Optimal Comfort and Wellbeing  Outcome: Progressing  Goal: Readiness for Transition of Care  Outcome: Progressing     Problem:  Fall Injury Risk  Goal: Absence of Fall, Infant Drop and Related Injury  Outcome: Progressing     Problem: Infection  Goal: Absence of Infection Signs and Symptoms  Outcome: Progressing     Problem: Bariatric Environmental Safety  Goal: Safety Maintained with Care  Outcome: Progressing     Problem: Postpartum (Vaginal Delivery)  Goal: Successful Parent Role Transition  Outcome: Progressing  Goal: Hemostasis  Outcome: Progressing  Goal: Absence of Infection Signs and Symptoms  Outcome: Progressing  Goal: Anesthesia/Sedation Recovery  Outcome: Progressing  Goal: Optimal Pain Control and Function  Outcome: Progressing  Goal: Effective Urinary Elimination  Outcome: Progressing     
Patient afebrile and had no falls this shift. Fundus firm without massage and below umbilicus. Bleeding light, no clots passed this shift. Voids spontaneously. Ambulates independently. Pain well controlled with oral pain medication. Vital signs stable at this time. Bonding well with infant; responds to infant cues and participates in infant care. Will continue to monitor.      Problem: Adult Inpatient Plan of Care  Goal: Plan of Care Review  Outcome: Progressing  Goal: Patient-Specific Goal (Individualized)  Outcome: Progressing  Goal: Absence of Hospital-Acquired Illness or Injury  Outcome: Progressing  Goal: Optimal Comfort and Wellbeing  Outcome: Progressing  Goal: Readiness for Transition of Care  Outcome: Progressing     Problem:  Fall Injury Risk  Goal: Absence of Fall, Infant Drop and Related Injury  Outcome: Progressing     Problem: Infection  Goal: Absence of Infection Signs and Symptoms  Outcome: Progressing     Problem: Bariatric Environmental Safety  Goal: Safety Maintained with Care  Outcome: Progressing     Problem: Postpartum (Vaginal Delivery)  Goal: Successful Parent Role Transition  Outcome: Progressing  Goal: Hemostasis  Outcome: Progressing  Goal: Absence of Infection Signs and Symptoms  Outcome: Progressing  Goal: Anesthesia/Sedation Recovery  Outcome: Progressing  Goal: Optimal Pain Control and Function  Outcome: Progressing  Goal: Effective Urinary Elimination  Outcome: Progressing     
DISPLAY PLAN FREE TEXT

## 2024-07-15 NOTE — PROGRESS NOTES
[facility] ([facility])  Obstetrics  Postpartum Progress Note    Patient Name: Pranay Malik  MRN: [id number]  Admission Date: 7/13/2024  Hospital Length of Stay: 2 days  Attending Physician: Nasreen Ragland MD  Primary Care Provider: Cindy, Primary Doctor    Subjective:     Principal Problem:NVD (normal vaginal delivery)    Hospital Course:  7/14/24:PPD1, doing really well this morning. Routine PP care  7/15/24 PPD2 Doing well, no complaints. Ready for discharge. Reviewed PP warning signs and PP pre-E s/s. BP check in clinic Thursday.    Interval History: PPD2    She is doing well this morning. She is tolerating a regular diet without nausea or vomiting. She is voiding spontaneously. She is ambulating. She has passed flatus, and has a BM. Vaginal bleeding is mild. She denies fever or chills. Abdominal pain is mild and controlled with oral medications. She Is not breastfeeding. She desires circumcision for her male baby: yes. Plans OCP's for contraception PP.     Objective:     Vital Signs (Most Recent):  Temp: 97.9 °F (36.6 °C) (07/15/24 0831)  Pulse: (!) 58 (07/15/24 0831)  Resp: 18 (07/15/24 0831)  BP: 137/80 (07/15/24 0831)  SpO2: (!) 93 % (07/15/24 0413) Vital Signs (24h Range):  Temp:  [97.7 °F (36.5 °C)-98.9 °F (37.2 °C)] 97.9 °F (36.6 °C)  Pulse:  [51-63] 58  Resp:  [18] 18  SpO2:  [93 %-96 %] 93 %  BP: (112-163)/(68-87) 137/80     Weight: 135.6 kg (299 lb)  Body mass index is 44.15 kg/m².      Intake/Output Summary (Last 24 hours) at 7/15/2024 0847  Last data filed at 7/14/2024 1449  Gross per 24 hour   Intake --   Output 300 ml   Net -300 ml         Significant Labs:  Lab Results   Component Value Date    GROUPTRH A POS 07/13/2024    HEPBSAG Non-reactive 01/30/2024    STREPBCULT No Group B Streptococcus isolated 06/19/2024     Recent Labs   Lab 07/13/24  1359   HGB 13.1   HCT 39.6       CBC:   Recent Labs   Lab 07/13/24  1359   WBC 4.59   RBC 4.02   HGB 13.1   HCT 39.6      MCV 99*    MCH 32.6*   MCHC 33.1     CMP:   Recent Labs   Lab 24  1359   GLU 98   CALCIUM 8.7   ALBUMIN 2.8*   PROT 7.3      K 3.7   CO2 23      BUN 6   CREATININE 0.7   ALKPHOS 225*   ALT 10   AST 23   BILITOT 0.3     HIV:   Recent Labs   Lab 24  1359   [id number] Negative       Physical Exam:   Constitutional: She is oriented to person, place, and time. She appears well-developed and well-nourished.    HENT:   Head: Normocephalic and atraumatic.    Eyes: Conjunctivae and EOM are normal.     Cardiovascular:  Normal rate, regular rhythm and normal heart sounds.      Exam reveals edema (1+ pitting, has compression hose on).        Pulmonary/Chest: Effort normal and breath sounds normal. No respiratory distress.        Abdominal: Soft. Bowel sounds are normal. She exhibits no distension. There is no abdominal tenderness.     Genitourinary:    Vagina and uterus normal.             Musculoskeletal: Normal range of motion and moves all extremeties.       Neurological: She is alert and oriented to person, place, and time.    Skin: Skin is warm and dry.    Psychiatric: She has a normal mood and affect. Her behavior is normal. Judgment and thought content normal.       Review of Systems  Assessment/Plan:     39 y.o. female  for:    * NVD (normal vaginal delivery)  Routine postpartum care    Single liveborn infant delivered vaginally  Viable male infant in care of peds    Hypertension, essential  Bp stable no meds  Reviewed s/s of pre-E  BP check in clinic Thursday    Generalized anxiety disorder  Buspar daily    Mild intermittent asthma without complication  Remains well controlled with symbicort and albuterol. No recent exacerbations.    Obesity affecting pregnancy, antepartum  Lovenox ordered      Disposition: As patient meets milestones, will plan to discharge today.    Eugenia Escobar CNM  Obstetrics  [facility] ([facility])

## 2024-07-15 NOTE — DISCHARGE INSTRUCTIONS
"Mother Self Care:    Activity: Avoid strenuous exercise and get adequate rest.  No driving until the physician consent given.  Emotional Changes: Most women find birth to be a time of great emotional upheaval.  Sense of loss, mood swings, fatigue, anxiety, and feeling "let down" are common.  If feelings worsen or last more than a week, call your physician.  Breast Care/Breastfeeding: Wear a bra for comfort.  Keep nipples dry and apply your own breast milk or lanolin cream as needed for soreness.  Engorgement can be relieved with warm, moist heat before feedings.  You may also take Ibuprofen.  Breast Care/Bottle Feeding: Wear support bra 24 hours a day for one week.  Avoid stimulation to breasts.  You may use ice packs for discomfort.  Fartun-Care/Vaginal Bleeding: Remember to use your fartun-bottle after urinating.  Your flow will change from red, to pink, to yellow/white color over a period of 2 weeks.  Menstruation will return in 3-8 weeks, or longer if breastfeeding.  Episiotomy Vaginal Delivery: Stitches will dissolve within 10 days to 3 weeks.  Warm baths, tucks, and dermoplast will promote healing.  Avoid bubble baths or strong soaps.   Section/Tubal Ligation: Keep incision clean and dry. You may shower, but avoid baths. Please continue to use Nozin twice a day for 7 days after surgery. Ensure you attend your 1 week post op visit to have your dressing removed. Use antibacterial soap to wash your entire body until directed otherwise by a Physician, it is very important to shower daily. If you become concerned about the way your incision site looks, please contact your providers office.   Sexual Activity/Pelvic Rest: No sexual activity, tampons, or douching until your physician gives you consent.  Diet: Continue to eat from the five basic food groups, including plenty of protein, fruits, vegetables, and whole grains.  Limit empty calories and high fat foods.  Drink enough fluids to satisfy thirst and add an " "extra 500 calories for breastfeeding.  Constipation/Hemorrhoids: Drink plenty of water.  You may take a stool softener or natural laxative (Metamucil). You may use tucks or hemorrhoid ointment and soak in a warm tub.    "What does help look like to you when you go home?"  "Is there any need that you anticipate that we may be able to assist with?"    CALL YOUR OB DOCTOR IF ANY OF THE FOLLOWING OCCURS:  *Heavy bleeding - saturating a pad an hour or passing any large (2-3 inches in size) blood clots.  *Any pain, redness, or tenderness in lower leg.  *You cannot care for yourself or your baby.  *Any signs of infection-      - Temperature greater than 100.5 degrees F      - Foul smelling vaginal discharge and/or incisional drainage      - Increased episiotomy or incisional pain      - Hot, hard, red or sore area on breast      - Flu-like symptoms      - Any urgency, frequency or burning with urination    Return To the Hospital for further Evaluation:  Headache not relieved by tylenol or ibuprofen  Blurry vision, double vision, seeing spots, or flashing lights  Feeling faint or passing out  Right epigastric pain  Difficulty breathing  Swelling in hands, face, or feet  Any of these symptoms accompanied by nausea/vomiting  Gaining more than 5 pounds in one week  Seizures  These symptoms could be an indication of elevated blood pressure.     For patients that were treated for high blood pressure during pregnancy and or your hospital stay you will need a blood pressure check three days after you leave the hospital. Your nursing staff will assist you in an appointment if needed. If you have Connected Mom you may use your blood pressure cuff and report any readings 140/90 to your provider immediately.       If you have any questions that need to be answered immediately please call the Labor & Delivery Unit at [phone] and ask to speak to a nurse.      Please see [facility] BLUE folder for additional information and handouts. "

## 2024-08-07 ENCOUNTER — POSTPARTUM VISIT (OUTPATIENT)
Dept: OBSTETRICS AND GYNECOLOGY | Facility: CLINIC | Age: 39
End: 2024-08-07
Payer: MEDICAID

## 2024-08-07 VITALS
SYSTOLIC BLOOD PRESSURE: 153 MMHG | BODY MASS INDEX: 40.29 KG/M2 | WEIGHT: 272.06 LBS | HEIGHT: 69 IN | DIASTOLIC BLOOD PRESSURE: 100 MMHG

## 2024-08-07 DIAGNOSIS — I10 HYPERTENSION, ESSENTIAL: Primary | ICD-10-CM

## 2024-08-07 PROBLEM — O99.210 OBESITY AFFECTING PREGNANCY, ANTEPARTUM: Status: RESOLVED | Noted: 2021-12-29 | Resolved: 2024-08-07

## 2024-08-07 LAB
CREAT UR-MCNC: 121 MG/DL (ref 15–325)
PROT UR-MCNC: <7 MG/DL (ref 0–15)
PROT/CREAT UR: NORMAL MG/G{CREAT} (ref 0–0.2)

## 2024-08-07 PROCEDURE — 99213 OFFICE O/P EST LOW 20 MIN: CPT | Mod: PBBFAC | Performed by: ADVANCED PRACTICE MIDWIFE

## 2024-08-07 PROCEDURE — 99999 PR PBB SHADOW E&M-EST. PATIENT-LVL III: CPT | Mod: PBBFAC,,, | Performed by: ADVANCED PRACTICE MIDWIFE

## 2024-08-07 PROCEDURE — 82570 ASSAY OF URINE CREATININE: CPT | Performed by: ADVANCED PRACTICE MIDWIFE

## 2024-08-07 PROCEDURE — 99999PBSHW PR PBB SHADOW TECHNICAL ONLY FILED TO HB: Mod: PBBFAC,,,

## 2024-08-07 RX ORDER — MEDROXYPROGESTERONE ACETATE 150 MG/ML
150 INJECTION, SUSPENSION INTRAMUSCULAR
Status: ACTIVE | OUTPATIENT
Start: 2024-08-07 | End: 2025-10-31

## 2024-08-07 RX ORDER — NIFEDIPINE 30 MG/1
30 TABLET, EXTENDED RELEASE ORAL DAILY
Qty: 30 TABLET | Refills: 11 | Status: SHIPPED | OUTPATIENT
Start: 2024-08-07 | End: 2025-08-07

## 2024-08-07 RX ADMIN — MEDROXYPROGESTERONE ACETATE 150 MG: 150 INJECTION, SUSPENSION INTRAMUSCULAR at 09:08

## 2024-10-23 ENCOUNTER — CLINICAL SUPPORT (OUTPATIENT)
Dept: OBSTETRICS AND GYNECOLOGY | Facility: CLINIC | Age: 39
End: 2024-10-23
Payer: MEDICAID

## 2024-10-23 DIAGNOSIS — Z30.42 DEPOT CONTRACEPTION: Primary | ICD-10-CM

## 2024-10-23 PROCEDURE — 99211 OFF/OP EST MAY X REQ PHY/QHP: CPT | Mod: PBBFAC

## 2024-10-23 PROCEDURE — 96372 THER/PROPH/DIAG INJ SC/IM: CPT | Mod: PBBFAC

## 2024-10-23 PROCEDURE — 99999 PR PBB SHADOW E&M-EST. PATIENT-LVL I: CPT | Mod: PBBFAC,,,

## 2024-10-23 PROCEDURE — 99999PBSHW PR PBB SHADOW TECHNICAL ONLY FILED TO HB: Mod: PBBFAC,,,

## 2024-10-23 RX ADMIN — MEDROXYPROGESTERONE ACETATE 150 MG: 150 INJECTION, SUSPENSION INTRAMUSCULAR at 09:10

## 2024-11-12 ENCOUNTER — PATIENT MESSAGE (OUTPATIENT)
Dept: RESEARCH | Facility: OTHER | Age: 39
End: 2024-11-12
Payer: MEDICAID

## 2025-01-09 ENCOUNTER — CLINICAL SUPPORT (OUTPATIENT)
Dept: OBSTETRICS AND GYNECOLOGY | Facility: CLINIC | Age: 40
End: 2025-01-09
Payer: MEDICAID

## 2025-01-09 DIAGNOSIS — Z30.42 DEPOT CONTRACEPTION: Primary | ICD-10-CM

## 2025-01-09 PROCEDURE — 99999PBSHW PR PBB SHADOW TECHNICAL ONLY FILED TO HB: Mod: PBBFAC,,,

## 2025-01-09 PROCEDURE — 96372 THER/PROPH/DIAG INJ SC/IM: CPT | Mod: PBBFAC

## 2025-01-09 PROCEDURE — 99212 OFFICE O/P EST SF 10 MIN: CPT | Mod: PBBFAC

## 2025-01-09 PROCEDURE — 99999 PR PBB SHADOW E&M-EST. PATIENT-LVL II: CPT | Mod: PBBFAC,,,

## 2025-01-09 RX ADMIN — MEDROXYPROGESTERONE ACETATE 150 MG: 150 INJECTION, SUSPENSION INTRAMUSCULAR at 08:01

## 2025-01-09 NOTE — PROGRESS NOTES
150mg of medroxyprogesterone administered IM to right ventrogluteal. Patient tolerated well. No pain or discomfort noted. Advised to wait 15 minutes after in clinic. Patient verbalized understanding. Next injection/wwe scheduled for 03/31/25 at 8:30am to see SOLA Forbes at the [facility] location. Email sent for appt to be scheduled.

## 2025-03-07 ENCOUNTER — TELEPHONE (OUTPATIENT)
Dept: OBSTETRICS AND GYNECOLOGY | Facility: CLINIC | Age: 40
End: 2025-03-07
Payer: MEDICAID

## 2025-03-07 NOTE — TELEPHONE ENCOUNTER
Called pt regarding appt on 3/31 confirmed pt identifiers informed pt provider will no longer travel to [facility] on Mondays only Wednesdays at this time offered the following Wednesday 4/2 for appt pt accepted 9:30 am 4/2 at [facility] with reshma

## 2025-04-02 ENCOUNTER — OFFICE VISIT (OUTPATIENT)
Dept: OBSTETRICS AND GYNECOLOGY | Facility: CLINIC | Age: 40
End: 2025-04-02
Payer: MEDICAID

## 2025-04-02 VITALS
WEIGHT: 260.25 LBS | SYSTOLIC BLOOD PRESSURE: 124 MMHG | DIASTOLIC BLOOD PRESSURE: 78 MMHG | BODY MASS INDEX: 38.55 KG/M2 | HEIGHT: 69 IN

## 2025-04-02 DIAGNOSIS — Z30.42 SURVEILLANCE FOR DEPO-PROVERA CONTRACEPTION: ICD-10-CM

## 2025-04-02 DIAGNOSIS — Z12.39 ENCOUNTER FOR SCREENING FOR MALIGNANT NEOPLASM OF BREAST, UNSPECIFIED SCREENING MODALITY: Primary | ICD-10-CM

## 2025-04-02 PROCEDURE — 96372 THER/PROPH/DIAG INJ SC/IM: CPT | Mod: PBBFAC,PN

## 2025-04-02 PROCEDURE — 99999PBSHW PR PBB SHADOW TECHNICAL ONLY FILED TO HB: Mod: PBBFAC,,,

## 2025-04-02 PROCEDURE — 99213 OFFICE O/P EST LOW 20 MIN: CPT | Mod: PBBFAC,PN | Performed by: NURSE PRACTITIONER

## 2025-04-02 PROCEDURE — 99999 PR PBB SHADOW E&M-EST. PATIENT-LVL III: CPT | Mod: PBBFAC,,, | Performed by: NURSE PRACTITIONER

## 2025-04-02 PROCEDURE — 99499 UNLISTED E&M SERVICE: CPT | Mod: S$PBB,,, | Performed by: NURSE PRACTITIONER

## 2025-04-02 RX ADMIN — MEDROXYPROGESTERONE ACETATE 150 MG: 150 INJECTION, SUSPENSION INTRAMUSCULAR at 09:04

## 2025-04-02 NOTE — PROGRESS NOTES
Pt present for annual and repeat pap today; reports heavy flow and would like to only have depo done today.  Has been having irregular cycles.  Has received two doses of depo.  Will r/s for pap -- only seen by nurse today.  ANNE Roy-BC

## 2025-04-02 NOTE — PROGRESS NOTES
Patient in clinic today for Depo   Two pt identifiers identified   Patient notified to wait 15 minutes after recieiving injection, patient verbalized understanding.   Depo Provera 150 mg/ml administered IM to patients left ventrogluteal.  Patient tolerated well.  Next injection scheduled.

## 2025-04-03 ENCOUNTER — HOSPITAL ENCOUNTER (OUTPATIENT)
Dept: RADIOLOGY | Facility: HOSPITAL | Age: 40
Discharge: HOME | End: 2025-04-03
Attending: NURSE PRACTITIONER
Payer: MEDICAID

## 2025-04-03 VITALS — HEIGHT: 69 IN | BODY MASS INDEX: 38.57 KG/M2 | WEIGHT: 260.38 LBS

## 2025-04-03 DIAGNOSIS — Z12.39 ENCOUNTER FOR SCREENING FOR MALIGNANT NEOPLASM OF BREAST, UNSPECIFIED SCREENING MODALITY: ICD-10-CM

## 2025-04-03 PROCEDURE — 77067 SCR MAMMO BI INCL CAD: CPT | Mod: 26,,, | Performed by: RADIOLOGY

## 2025-04-03 PROCEDURE — 77067 SCR MAMMO BI INCL CAD: CPT | Mod: TC

## 2025-04-03 PROCEDURE — 77063 BREAST TOMOSYNTHESIS BI: CPT | Mod: 26,,, | Performed by: RADIOLOGY

## 2025-04-04 ENCOUNTER — TELEPHONE (OUTPATIENT)
Dept: RADIOLOGY | Facility: HOSPITAL | Age: 40
End: 2025-04-04
Payer: MEDICAID

## 2025-04-07 ENCOUNTER — RESULTS FOLLOW-UP (OUTPATIENT)
Dept: OBSTETRICS AND GYNECOLOGY | Facility: CLINIC | Age: 40
End: 2025-04-07

## 2025-04-07 DIAGNOSIS — R92.2 INCONCLUSIVE MAMMOGRAPHY: Primary | ICD-10-CM

## 2025-04-15 ENCOUNTER — HOSPITAL ENCOUNTER (OUTPATIENT)
Dept: RADIOLOGY | Facility: HOSPITAL | Age: 40
Discharge: HOME | End: 2025-04-15
Attending: NURSE PRACTITIONER
Payer: MEDICAID

## 2025-04-15 DIAGNOSIS — R92.8 ABNORMAL MAMMOGRAM: ICD-10-CM

## 2025-04-15 DIAGNOSIS — Z12.39 ENCOUNTER FOR SCREENING FOR MALIGNANT NEOPLASM OF BREAST, UNSPECIFIED SCREENING MODALITY: ICD-10-CM

## 2025-04-15 PROCEDURE — 77061 BREAST TOMOSYNTHESIS UNI: CPT | Mod: TC,RT

## 2025-04-15 PROCEDURE — 77065 DX MAMMO INCL CAD UNI: CPT | Mod: 26,RT,, | Performed by: RADIOLOGY

## 2025-04-15 PROCEDURE — 77061 BREAST TOMOSYNTHESIS UNI: CPT | Mod: 26,RT,, | Performed by: RADIOLOGY

## 2025-04-15 PROCEDURE — 76642 ULTRASOUND BREAST LIMITED: CPT | Mod: 26,RT,, | Performed by: RADIOLOGY

## 2025-04-15 PROCEDURE — 76642 ULTRASOUND BREAST LIMITED: CPT | Mod: TC,RT

## 2025-06-25 ENCOUNTER — TELEPHONE (OUTPATIENT)
Dept: OBSTETRICS AND GYNECOLOGY | Facility: CLINIC | Age: 40
End: 2025-06-25
Payer: MEDICAID

## 2025-06-25 ENCOUNTER — CLINICAL SUPPORT (OUTPATIENT)
Dept: OBSTETRICS AND GYNECOLOGY | Facility: CLINIC | Age: 40
End: 2025-06-25
Payer: MEDICAID

## 2025-06-25 DIAGNOSIS — Z30.42 ENCOUNTER FOR DEPO-PROVERA CONTRACEPTION: Primary | ICD-10-CM

## 2025-06-25 PROCEDURE — 99211 OFF/OP EST MAY X REQ PHY/QHP: CPT | Mod: PBBFAC

## 2025-06-25 PROCEDURE — 96372 THER/PROPH/DIAG INJ SC/IM: CPT | Mod: PBBFAC

## 2025-06-25 PROCEDURE — 99999 PR PBB SHADOW E&M-EST. PATIENT-LVL I: CPT | Mod: PBBFAC,,,

## 2025-06-25 PROCEDURE — 99999PBSHW PR PBB SHADOW TECHNICAL ONLY FILED TO HB: Mod: PBBFAC,,,

## 2025-06-25 RX ADMIN — MEDROXYPROGESTERONE ACETATE 150 MG: 150 INJECTION, SUSPENSION INTRAMUSCULAR at 09:06

## 2025-06-25 NOTE — PROGRESS NOTES
Patient in clinic today for Nurse Visit   Two pt identifiers identified   Patient notified to wait 15 minutes after recieiving injection, patient verbalized understanding.   Depo Provera 150 mg/ml administered IM to patients right ventrogluteal.  Patient tolerated well.  Next injection scheduled.

## 2025-06-25 NOTE — TELEPHONE ENCOUNTER
Left voicemail for pt stating that she has already had her well woman visit, but would need to schedule her next depo injection for anytime between 6/18-7/2.

## 2025-08-22 ENCOUNTER — PATIENT MESSAGE (OUTPATIENT)
Dept: OBSTETRICS AND GYNECOLOGY | Facility: CLINIC | Age: 40
End: 2025-08-22
Payer: MEDICAID